# Patient Record
Sex: MALE | Race: WHITE | Employment: OTHER | URBAN - NONMETROPOLITAN AREA
[De-identification: names, ages, dates, MRNs, and addresses within clinical notes are randomized per-mention and may not be internally consistent; named-entity substitution may affect disease eponyms.]

---

## 2017-01-01 ENCOUNTER — HOSPITAL ENCOUNTER (OUTPATIENT)
Dept: LAB | Age: 77
Discharge: HOME | End: 2017-01-31

## 2017-01-23 LAB
INR BLD: 2.04 INDEX
PROTHROMBIN TIME: 24.4 SECONDS (ref 10–14.3)

## 2017-02-01 ENCOUNTER — HOSPITAL ENCOUNTER (OUTPATIENT)
Dept: LAB | Age: 77
Discharge: HOME | End: 2017-02-28

## 2017-02-20 LAB
INR BLD: 2.6 INDEX
PROTHROMBIN TIME: 31.3 SECONDS (ref 10–14.3)

## 2017-03-01 ENCOUNTER — HOSPITAL ENCOUNTER (OUTPATIENT)
Dept: LAB | Age: 77
Discharge: HOME | End: 2017-03-31

## 2017-03-17 ENCOUNTER — HOSPITAL ENCOUNTER (OUTPATIENT)
Dept: LAB | Age: 77
Discharge: HOME | End: 2017-03-17

## 2017-03-17 LAB
ALBUMIN SERPL-MCNC: 4.3 GM/DL (ref 3.4–5)
ALP BLD-CCNC: 90 IU/L (ref 40–129)
ALT SERPL-CCNC: 17 U/L (ref 10–40)
ANION GAP SERPL CALCULATED.3IONS-SCNC: 5 MMOL/L (ref 4–16)
AST SERPL-CCNC: 20 IU/L (ref 15–37)
BASOPHILS ABSOLUTE: 0.1 K/CU MM
BASOPHILS RELATIVE PERCENT: 1.1 % (ref 0–1)
BILIRUB SERPL-MCNC: 0.7 MG/DL (ref 0–1)
BUN BLDV-MCNC: 13 MG/DL (ref 6–23)
CALCIUM SERPL-MCNC: 9.3 MG/DL (ref 8.3–10.6)
CHLORIDE BLD-SCNC: 101 MMOL/L (ref 99–110)
CHOLESTEROL: 175 MG/DL
CO2: 32 MMOL/L (ref 21–32)
CREAT SERPL-MCNC: 1.1 MG/DL (ref 0.9–1.3)
DIFFERENTIAL TYPE: ABNORMAL
EOSINOPHILS ABSOLUTE: 0.2 K/CU MM
EOSINOPHILS RELATIVE PERCENT: 3.5 % (ref 0–3)
GFR AFRICAN AMERICAN: >60 ML/MIN/1.73M2
GFR NON-AFRICAN AMERICAN: >60 ML/MIN/1.73M2
GLUCOSE BLD-MCNC: 95 MG/DL (ref 70–140)
HCT VFR BLD CALC: 45.7 % (ref 42–52)
HDLC SERPL-MCNC: 33 MG/DL
HEMOGLOBIN: 14.7 GM/DL (ref 13.5–18)
IMMATURE NEUTROPHIL %: 0.2 % (ref 0–0.43)
INR BLD: 3.59 INDEX
LDL CHOLESTEROL DIRECT: 130 MG/DL
LYMPHOCYTES ABSOLUTE: 1.7 K/CU MM
LYMPHOCYTES RELATIVE PERCENT: 37.4 % (ref 24–44)
MCH RBC QN AUTO: 32 PG (ref 27–31)
MCHC RBC AUTO-ENTMCNC: 32.2 % (ref 32–36)
MCV RBC AUTO: 99.6 FL (ref 78–100)
MONOCYTES ABSOLUTE: 0.5 K/CU MM
MONOCYTES RELATIVE PERCENT: 11.1 % (ref 0–4)
PDW BLD-RTO: 12.5 % (ref 11.7–14.9)
PLATELET # BLD: 179 K/CU MM (ref 140–440)
PMV BLD AUTO: 9.5 FL (ref 7.5–11.1)
POTASSIUM SERPL-SCNC: 4.3 MMOL/L (ref 3.5–5.1)
PROTHROMBIN TIME: 42.6 SECONDS (ref 10–14.3)
RBC # BLD: 4.59 M/CU MM (ref 4.6–6.2)
SEGMENTED NEUTROPHILS ABSOLUTE COUNT: 2.1 K/CU MM
SEGMENTED NEUTROPHILS RELATIVE PERCENT: 46.7 % (ref 36–66)
SODIUM BLD-SCNC: 138 MMOL/L (ref 135–145)
TOTAL CK: 96 IU/L (ref 38–174)
TOTAL IMMATURE NEUTOROPHIL: 0.01 K/CU MM
TOTAL PROTEIN: 7.2 GM/DL (ref 6.4–8.2)
TRIGL SERPL-MCNC: 127 MG/DL
WBC # BLD: 4.5 K/CU MM (ref 4–10.5)

## 2017-03-28 RX ORDER — LOSARTAN POTASSIUM 25 MG/1
25 TABLET ORAL DAILY
COMMUNITY
End: 2017-08-15

## 2017-04-01 ENCOUNTER — HOSPITAL ENCOUNTER (OUTPATIENT)
Dept: LAB | Age: 77
Discharge: HOME | End: 2017-04-30

## 2017-04-04 LAB
INR BLD: 2.04 INDEX
PROTHROMBIN TIME: 23.8 SECONDS (ref 10–14.3)

## 2017-04-18 LAB
INR BLD: 1.9 INDEX
PROTHROMBIN TIME: 22.1 SECONDS (ref 10–14.3)

## 2017-05-01 ENCOUNTER — HOSPITAL ENCOUNTER (OUTPATIENT)
Dept: LAB | Age: 77
Discharge: HOME | End: 2017-05-31

## 2017-05-17 LAB
INR BLD: 1.88 INDEX
PROTHROMBIN TIME: 21.8 SECONDS (ref 10–14.3)

## 2017-06-01 ENCOUNTER — HOSPITAL ENCOUNTER (OUTPATIENT)
Dept: LAB | Age: 77
Discharge: HOME | End: 2017-06-30

## 2017-06-01 LAB
INR BLD: 1.99 INDEX
PROTHROMBIN TIME: 23.2 SECONDS (ref 10–14.3)

## 2017-06-30 LAB
INR BLD: 1.56 INDEX
PROTHROMBIN TIME: 18 SECONDS (ref 10–14.3)

## 2017-07-01 ENCOUNTER — HOSPITAL ENCOUNTER (OUTPATIENT)
Dept: LAB | Age: 77
Discharge: HOME | End: 2017-07-31

## 2017-07-07 ENCOUNTER — HOSPITAL ENCOUNTER (OUTPATIENT)
Dept: LAB | Age: 77
Discharge: HOME | End: 2017-07-07

## 2017-07-07 LAB
ALBUMIN SERPL-MCNC: 4.3 GM/DL (ref 3.4–5)
ALP BLD-CCNC: 89 IU/L (ref 40–129)
ALT SERPL-CCNC: 17 U/L (ref 10–40)
ANION GAP SERPL CALCULATED.3IONS-SCNC: 8 MMOL/L (ref 4–16)
AST SERPL-CCNC: 19 IU/L (ref 15–37)
BASOPHILS ABSOLUTE: 0.1 K/CU MM
BASOPHILS RELATIVE PERCENT: 1.5 % (ref 0–1)
BILIRUB SERPL-MCNC: 0.8 MG/DL (ref 0–1)
BUN BLDV-MCNC: 14 MG/DL (ref 6–23)
CALCIUM SERPL-MCNC: 9.7 MG/DL (ref 8.3–10.6)
CHLORIDE BLD-SCNC: 101 MMOL/L (ref 99–110)
CHOLESTEROL, FASTING: 175 MG/DL
CO2: 30 MMOL/L (ref 21–32)
CREAT SERPL-MCNC: 1.1 MG/DL (ref 0.9–1.3)
DIFFERENTIAL TYPE: ABNORMAL
EOSINOPHILS ABSOLUTE: 0.2 K/CU MM
EOSINOPHILS RELATIVE PERCENT: 3.7 % (ref 0–3)
GFR AFRICAN AMERICAN: >60 ML/MIN/1.73M2
GFR NON-AFRICAN AMERICAN: >60 ML/MIN/1.73M2
GLUCOSE FASTING: 94 MG/DL (ref 70–99)
HCT VFR BLD CALC: 47.3 % (ref 42–52)
HDLC SERPL-MCNC: 34 MG/DL
HEMOGLOBIN: 15.3 GM/DL (ref 13.5–18)
IMMATURE NEUTROPHIL %: 0.2 % (ref 0–0.43)
LDL CHOLESTEROL DIRECT: 127 MG/DL
LYMPHOCYTES ABSOLUTE: 2 K/CU MM
LYMPHOCYTES RELATIVE PERCENT: 42 % (ref 24–44)
MCH RBC QN AUTO: 32.6 PG (ref 27–31)
MCHC RBC AUTO-ENTMCNC: 32.3 % (ref 32–36)
MCV RBC AUTO: 100.9 FL (ref 78–100)
MONOCYTES ABSOLUTE: 0.5 K/CU MM
MONOCYTES RELATIVE PERCENT: 11.6 % (ref 0–4)
PDW BLD-RTO: 12.8 % (ref 11.7–14.9)
PLATELET # BLD: 217 K/CU MM (ref 140–440)
PMV BLD AUTO: 9.5 FL (ref 7.5–11.1)
POTASSIUM SERPL-SCNC: 4.5 MMOL/L (ref 3.5–5.1)
RBC # BLD: 4.69 M/CU MM (ref 4.6–6.2)
SEGMENTED NEUTROPHILS ABSOLUTE COUNT: 1.9 K/CU MM
SEGMENTED NEUTROPHILS RELATIVE PERCENT: 41 % (ref 36–66)
SODIUM BLD-SCNC: 139 MMOL/L (ref 135–145)
TOTAL CK: 123 IU/L (ref 38–174)
TOTAL IMMATURE NEUTOROPHIL: 0.01 K/CU MM
TOTAL PROTEIN: 7.3 GM/DL (ref 6.4–8.2)
TRIGLYCERIDE, FASTING: 102 MG/DL
WBC # BLD: 4.6 K/CU MM (ref 4–10.5)

## 2017-07-28 ENCOUNTER — TELEPHONE (OUTPATIENT)
Dept: CARDIOLOGY CLINIC | Age: 77
End: 2017-07-28

## 2017-07-28 ENCOUNTER — HOSPITAL ENCOUNTER (OUTPATIENT)
Dept: LAB | Age: 77
Discharge: HOME | End: 2017-07-28

## 2017-07-28 LAB
ANION GAP SERPL CALCULATED.3IONS-SCNC: 11 MMOL/L (ref 4–16)
BUN BLDV-MCNC: 17 MG/DL (ref 6–23)
CALCIUM SERPL-MCNC: 9 MG/DL (ref 8.3–10.6)
CHLORIDE BLD-SCNC: 99 MMOL/L (ref 99–110)
CO2: 27 MMOL/L (ref 21–32)
CREAT SERPL-MCNC: 1.1 MG/DL (ref 0.9–1.3)
GFR AFRICAN AMERICAN: >60 ML/MIN/1.73M2
GFR NON-AFRICAN AMERICAN: >60 ML/MIN/1.73M2
GLUCOSE BLD-MCNC: 87 MG/DL (ref 70–140)
POTASSIUM SERPL-SCNC: 4.3 MMOL/L (ref 3.5–5.1)
SODIUM BLD-SCNC: 137 MMOL/L (ref 135–145)

## 2017-08-01 ENCOUNTER — HOSPITAL ENCOUNTER (OUTPATIENT)
Dept: OTHER | Age: 77
Discharge: HOME | End: 2017-08-31
Attending: INTERNAL MEDICINE | Admitting: INTERNAL MEDICINE

## 2017-08-01 ENCOUNTER — TELEPHONE (OUTPATIENT)
Age: 77
End: 2017-08-01

## 2017-08-01 ENCOUNTER — ANTI-COAG VISIT (OUTPATIENT)
Age: 77
End: 2017-08-01

## 2017-08-01 LAB
INR BLD: 1.7
POC INR: 1.7 INDEX
PROTHROMBIN TIME, POC: 20.6 SECONDS (ref 10–14.3)
PROTIME: 20.6 SECONDS

## 2017-08-15 ENCOUNTER — ANTI-COAG VISIT (OUTPATIENT)
Age: 77
End: 2017-08-15

## 2017-08-15 DIAGNOSIS — I48.91 ATRIAL FIBRILLATION, UNSPECIFIED TYPE (HCC): ICD-10-CM

## 2017-08-15 LAB
INR BLD: 2.3
POC INR: 2.3 INDEX
PROTHROMBIN TIME, POC: 27.3 SECONDS (ref 10–14.3)
PROTIME: 27.3 SECONDS

## 2017-08-15 RX ORDER — OLMESARTAN MEDOXOMIL 5 MG/1
5 TABLET ORAL 2 TIMES DAILY
COMMUNITY

## 2017-08-15 RX ORDER — HYDROCHLOROTHIAZIDE 25 MG/1
25 TABLET ORAL DAILY
COMMUNITY

## 2017-09-01 ENCOUNTER — HOSPITAL ENCOUNTER (OUTPATIENT)
Dept: OTHER | Age: 77
Discharge: HOME | End: 2017-09-30
Attending: INTERNAL MEDICINE | Admitting: INTERNAL MEDICINE

## 2017-09-12 ENCOUNTER — ANTI-COAG VISIT (OUTPATIENT)
Age: 77
End: 2017-09-12

## 2017-09-12 DIAGNOSIS — I48.91 ATRIAL FIBRILLATION, UNSPECIFIED TYPE (HCC): ICD-10-CM

## 2017-09-12 LAB
INR BLD: 2.1
POC INR: 2.1 INDEX
PROTHROMBIN TIME, POC: 25.1 SECONDS (ref 10–14.3)
PROTIME: 25.1 SECONDS

## 2017-10-01 ENCOUNTER — HOSPITAL ENCOUNTER (OUTPATIENT)
Dept: OTHER | Age: 77
Discharge: HOME | End: 2017-10-31
Attending: INTERNAL MEDICINE | Admitting: INTERNAL MEDICINE

## 2017-10-10 ENCOUNTER — ANTI-COAG VISIT (OUTPATIENT)
Age: 77
End: 2017-10-10

## 2017-10-10 DIAGNOSIS — I48.91 ATRIAL FIBRILLATION, UNSPECIFIED TYPE (HCC): ICD-10-CM

## 2017-10-10 LAB
INR BLD: 2
POC INR: 2 INDEX
PROTHROMBIN TIME, POC: 24 SECONDS (ref 10–14.3)
PROTIME: 24 SECONDS

## 2017-10-10 RX ORDER — WARFARIN SODIUM 1 MG/1
TABLET ORAL
COMMUNITY
End: 2018-02-27 | Stop reason: SDUPTHER

## 2017-10-10 RX ORDER — WARFARIN SODIUM 2.5 MG/1
TABLET ORAL
COMMUNITY
End: 2018-03-27 | Stop reason: SDUPTHER

## 2017-10-10 NOTE — PROGRESS NOTES
Patient verifies current dosing regimen as listed above. Patient denies any missed or extra doses. Patient denies any unusual bruising/bleeding/swelling/SOB. Patient denies changes in diet involving vitamin K. Patient denies any changes in prescription/OTC/herbal medications. Patient denies recent falls. Continue warfarin 2.5mg daily except 3.5mg Tuesdays and Fridays and return to clinic in 4 weeks. Dosing reminder sheet given with phone number, appointment date, and time.

## 2017-11-01 ENCOUNTER — HOSPITAL ENCOUNTER (OUTPATIENT)
Dept: OTHER | Age: 77
Discharge: HOME | End: 2017-11-30
Attending: INTERNAL MEDICINE | Admitting: INTERNAL MEDICINE

## 2017-11-07 ENCOUNTER — ANTI-COAG VISIT (OUTPATIENT)
Age: 77
End: 2017-11-07

## 2017-11-07 DIAGNOSIS — I48.91 ATRIAL FIBRILLATION, UNSPECIFIED TYPE (HCC): ICD-10-CM

## 2017-11-07 LAB
INR BLD: 1.7
POC INR: 1.7 INDEX
PROTHROMBIN TIME, POC: 20.1 SECONDS (ref 10–14.3)
PROTIME: 20.1 SECONDS

## 2017-11-07 NOTE — PROGRESS NOTES
Patient verifies current dosing regimen as listed above. Patient denies any missed or extra doses. Patient denies any unusual bruising/bleeding/swelling/SOB. Patient denies changes in diet involving vitamin K. Patient denies any changes in prescription/OTC/herbal medications. Patient denies recent falls. Take warfarin 5mg x 1 then increase dose by ~5% to warfarin 2.5mg daily except 3.5mg MWF and return to clinic in 4 weeks. Dosing reminder sheet given with phone number, appointment date, and time.

## 2017-11-14 ENCOUNTER — OFFICE VISIT (OUTPATIENT)
Dept: CARDIOLOGY CLINIC | Age: 77
End: 2017-11-14

## 2017-11-14 VITALS
WEIGHT: 234 LBS | BODY MASS INDEX: 32.76 KG/M2 | HEIGHT: 71 IN | SYSTOLIC BLOOD PRESSURE: 146 MMHG | HEART RATE: 80 BPM | DIASTOLIC BLOOD PRESSURE: 82 MMHG | RESPIRATION RATE: 16 BRPM

## 2017-11-14 DIAGNOSIS — I48.91 ATRIAL FIBRILLATION, UNSPECIFIED TYPE (HCC): Primary | ICD-10-CM

## 2017-11-14 DIAGNOSIS — I87.2 VENOUS INSUFFICIENCY: ICD-10-CM

## 2017-11-14 DIAGNOSIS — I10 ESSENTIAL HYPERTENSION: ICD-10-CM

## 2017-11-14 PROCEDURE — 99213 OFFICE O/P EST LOW 20 MIN: CPT | Performed by: INTERNAL MEDICINE

## 2017-11-14 PROCEDURE — 4040F PNEUMOC VAC/ADMIN/RCVD: CPT | Performed by: INTERNAL MEDICINE

## 2017-11-14 PROCEDURE — 1036F TOBACCO NON-USER: CPT | Performed by: INTERNAL MEDICINE

## 2017-11-14 PROCEDURE — 1123F ACP DISCUSS/DSCN MKR DOCD: CPT | Performed by: INTERNAL MEDICINE

## 2017-11-14 PROCEDURE — G8484 FLU IMMUNIZE NO ADMIN: HCPCS | Performed by: INTERNAL MEDICINE

## 2017-11-14 PROCEDURE — G8417 CALC BMI ABV UP PARAM F/U: HCPCS | Performed by: INTERNAL MEDICINE

## 2017-11-14 PROCEDURE — 93000 ELECTROCARDIOGRAM COMPLETE: CPT | Performed by: INTERNAL MEDICINE

## 2017-11-14 PROCEDURE — G8427 DOCREV CUR MEDS BY ELIG CLIN: HCPCS | Performed by: INTERNAL MEDICINE

## 2017-11-14 RX ORDER — DIAZEPAM 5 MG/1
5 TABLET ORAL
COMMUNITY
Start: 2017-11-03 | End: 2019-10-25 | Stop reason: ALTCHOICE

## 2017-11-14 NOTE — ASSESSMENT & PLAN NOTE
Rate is controlled and he is chronically anticoagulated and he seems to be tolerating warfarin with the low therapeutic INR well. Did not have recurrent epistaxis since last visit.

## 2017-11-14 NOTE — PROGRESS NOTES
[id number] Score for Atrial Fibrillation Stroke Risk   Risk   Factors  Component Value   C CHF No 0   H HTN Yes 1   A2 Age >= 76 Yes,  (77 y.o.) 2   D DM No 0   S2 Prior Stroke/TIA No 0   V Vascular Disease No 0   A Age 74-69 No,  (77 y.o.) 0   Sc Sex male 0    [id number]  Score  3   Score last updated 00/04/53 0:97 PM    Click here for a link to the UpToDate guideline \"Atrial Fibrillation: Anticoagulation therapy to prevent embolization    Disclaimer: Risk Score calculation is dependent on accuracy of patient problem list and past encounter diagnosis.

## 2017-11-14 NOTE — PROGRESS NOTES
Dharmesh Hernandez  1940  Marciano Doss MD    Chief complaint and HPI:  Dharmesh Hernandez  is a 59-year-old male follows up for chronic atrial fibrillation and hypertension. He denies any chest pain, palpitations, lightheaded mass or dizziness. He denies any significant dyspnea on exertion, orthopnea or paroxysmal nocturnal dyspnea. He has been very compliant to his medications. He denies any syncope or near syncope. He had multiple venous ablation more on the left than on the right by Dr. Patricia Ca and his leg swelling has improved. He has been using compression hoses regularly. He is being monitored now active Coumadin clinic for PT/INR and he expresses appreciation for further service locally. Rest of the Cardiovascular system review is otherwise unchanged from prior encounter. Past medical history:  has a past medical history of Atrial flutter ([address] H/O Doppler ultrasound; H/O echocardiogram; H/O myocardial perfusion scan; Hypertension; Varicose veins; and Venous insufficiency. Past surgical history:  has a past surgical history that includes Cholecystectomy; hernia repair; Tooth Extraction; Cataract removal; Hemorrhoid surgery; ostate Cryoablation (09/22/2016); cyst removal; and Cardioversion (3/6/2012). Social History:   Social History   Substance Use Topics    Smoking status: Former Smoker     Years: 40.00     Quit date: 1/1/2000    Smokeless tobacco: Never Used      Comment: smoked pipe    Alcohol use No     Family history: family history is not on file. ALLERGIES:  Latex; Dicyclomine; and Neosporin [bacitracin-neomycin-polymyxin]  Prior to Admission medications    Medication Sig Start Date End Date Taking? Authorizing Provider   diazepam (VALIUM) 5 MG tablet 5 mg  1 tab, before procedures.  11/3/17  Yes Historical Provider, MD   warfarin (COUMADIN) 1 MG tablet Take warfarin 2.5mg daily except 3.5mg Tuesdays and Fridays   Yes Historical Provider, MD   warfarin (COUMADIN) 2.5 MG tablet Take warfarin 2.5mg daily except 3.5mg Tuesdays and Fridays   Yes Historical Provider, MD   olmesartan (BENICAR) 5 MG tablet Take 5 mg by mouth 2 times daily   Yes Historical Provider, MD   hydrochlorothiazide (HYDRODIURIL) 25 MG tablet Take 25 mg by mouth daily   Yes Historical Provider, MD   metoprolol (LOPRESSOR) 50 MG tablet Take 1 tablet by mouth 2 times daily 7/21/15  Yes Christy Plascencia MD     Constitutional:  BP (!) 146/82   Pulse 80   Resp 16   Ht 5' 11\" (1.803 m)   Wt 234 lb (106.1 kg)   BMI 32.64 kg/m²    Body mass index is 32.64 kg/m². Wt Readings from Last 3 Encounters:   11/14/17 234 lb (106.1 kg)   11/15/16 230 lb (104.3 kg)   09/25/16 235 lb (106.6 kg)     General exam: Moderately obese pleasant male in no apparent distress he is awake alert oriented ×3. Head and Neck: Normocephalic. No thyromegaly. Neck is supple. He wears glasses. .    Carotids: No bruits noted   Jugular venous pressure: Not elevated. Heart[de-identified]  Irregular first and second heart sound without any murmurs or gallops. Peripheral Pulses: 1+ posterior tibials equal on both sides. 1+ dorsalis pedis. Extremities: 1+ edema noted bilaterally more on the left than on the right. Chest: Normal  Lungs: Clear to auscultation percussion  Abdomen: Soft non tender. Bowel sounds are normal. No organomegaly or ascites. Musculoskeletal: WNL  Skin: Normal in color and texture. No rash  Psychiatric: Normal mood and effect.    Neurologic exam:  No focal deficit    ECG today showed atrial fibrillation 80 bpm.    LAB REVIEW:  CBC:   Lab Results   Component Value Date    WBC 4.6 07/07/2017    HGB 15.3 07/07/2017    HCT 47.3 07/07/2017     07/07/2017     Lipids:   Lab Results   Component Value Date    CHOL 175 03/17/2017    TRIG 127 03/17/2017    HDL 34 (L) 07/07/2017    LDLDIRECT 127 (H) 07/07/2017     Renal:   Lab Results   Component Value Date    BUN 17 07/28/2017    CREATININE 1.1 07/28/2017     07/28/2017    K 4.3 07/28/2017 PT/INR:   Lab Results   Component Value Date    INR 1.7 11/07/2017     IMPRESSION and RECOMMENDATIONS:      HTN (hypertension)  Well controlled on current medications, reviewed individually with patient. AF (atrial fibrillation)  Rate is controlled and he is chronically anticoagulated and he seems to be tolerating warfarin with the low therapeutic INR well. Did not have recurrent epistaxis since last visit. Continue current cardiovascular medications which have been reviewed and discussed individually with you. Coumadin clinic monitoring PT/INR. Appropriate prescriptions if needed on this visit are addressed. After visit summery is provided. Questions answered and patient verbalizes understanding. Follow up in office in 12 months with ECG, sooner if needed. Riccardo Pinzon MD, 11/14/2017 3:13 PM     Please note this report has been partially produced using speech recognition software and may contain errors related to that system including errors in grammar, punctuation, and spelling, as well as words and phrases that may be inappropriate. If there are any questions or concerns please feel free to contact the dictating provider for clarification.

## 2017-12-01 ENCOUNTER — HOSPITAL ENCOUNTER (OUTPATIENT)
Dept: OTHER | Age: 77
Discharge: HOME | End: 2017-12-31
Attending: INTERNAL MEDICINE | Admitting: INTERNAL MEDICINE

## 2017-12-05 ENCOUNTER — ANTI-COAG VISIT (OUTPATIENT)
Age: 77
End: 2017-12-05

## 2017-12-05 DIAGNOSIS — I48.91 ATRIAL FIBRILLATION, UNSPECIFIED TYPE (HCC): ICD-10-CM

## 2017-12-05 LAB
INR BLD: 2.1
POC INR: 2.1 INDEX
PROTHROMBIN TIME, POC: 25 SECONDS (ref 10–14.3)
PROTIME: 25 SECONDS

## 2017-12-05 NOTE — PROGRESS NOTES
Patient verifies current dosing regimen as listed above. Patient denies any missed or extra doses. Patient denies any unusual bruising/bleeding/swelling/SOB. Patient denies changes in diet involving vitamin K. Patient denies any changes in prescription/OTC/herbal medications. Patient denies recent falls. Continue warfarin 2.5mg daily except 3.5mg MWF and return to clinic in 4 weeks. Dosing reminder sheet given with phone number, appointment date, and time.

## 2018-01-01 ENCOUNTER — HOSPITAL ENCOUNTER (OUTPATIENT)
Dept: OTHER | Age: 78
Discharge: HOME | End: 2018-01-31
Attending: INTERNAL MEDICINE | Admitting: INTERNAL MEDICINE

## 2018-01-02 ENCOUNTER — ANTI-COAG VISIT (OUTPATIENT)
Age: 78
End: 2018-01-02

## 2018-01-02 DIAGNOSIS — I48.91 ATRIAL FIBRILLATION, UNSPECIFIED TYPE (HCC): ICD-10-CM

## 2018-01-02 LAB
INR BLD: 2.9
POC INR: 2.9 INDEX
PROTHROMBIN TIME, POC: 34.4 SECONDS (ref 10–14.3)
PROTIME: 34.4 SECONDS

## 2018-01-30 ENCOUNTER — ANTI-COAG VISIT (OUTPATIENT)
Age: 78
End: 2018-01-30

## 2018-01-30 DIAGNOSIS — I48.91 ATRIAL FIBRILLATION, UNSPECIFIED TYPE (HCC): ICD-10-CM

## 2018-01-30 LAB
INR BLD: 3.1
POC INR: 3.1 INDEX
PROTHROMBIN TIME, POC: 37.1 SECONDS (ref 10–14.3)
PROTIME: 37.1 SECONDS

## 2018-02-01 ENCOUNTER — HOSPITAL ENCOUNTER (OUTPATIENT)
Dept: OTHER | Age: 78
Discharge: HOME | End: 2018-02-28
Attending: INTERNAL MEDICINE | Admitting: INTERNAL MEDICINE

## 2018-02-15 ENCOUNTER — HOSPITAL ENCOUNTER (OUTPATIENT)
Dept: OTHER | Age: 78
Discharge: HOME | End: 2018-02-15

## 2018-02-15 LAB
ALBUMIN SERPL-MCNC: 4.4 GM/DL (ref 3.4–5)
ALP BLD-CCNC: 98 IU/L (ref 40–129)
ALT SERPL-CCNC: 16 U/L (ref 10–40)
ANION GAP SERPL CALCULATED.3IONS-SCNC: 11 MMOL/L (ref 4–16)
AST SERPL-CCNC: 21 IU/L (ref 15–37)
BASOPHILS ABSOLUTE: 0.1 K/CU MM
BASOPHILS RELATIVE PERCENT: 1.1 % (ref 0–1)
BILIRUB SERPL-MCNC: 0.6 MG/DL (ref 0–1)
BUN BLDV-MCNC: 22 MG/DL (ref 6–23)
CALCIUM SERPL-MCNC: 9.4 MG/DL (ref 8.3–10.6)
CHLORIDE BLD-SCNC: 99 MMOL/L (ref 99–110)
CHOLESTEROL, FASTING: 197 MG/DL
CO2: 31 MMOL/L (ref 21–32)
CREAT SERPL-MCNC: 1.2 MG/DL (ref 0.9–1.3)
DIFFERENTIAL TYPE: ABNORMAL
EOSINOPHILS ABSOLUTE: 0.2 K/CU MM
EOSINOPHILS RELATIVE PERCENT: 3.7 % (ref 0–3)
GFR AFRICAN AMERICAN: >60 ML/MIN/1.73M2
GFR NON-AFRICAN AMERICAN: 59 ML/MIN/1.73M2
GLUCOSE FASTING: 101 MG/DL (ref 70–99)
HCT VFR BLD CALC: 47.3 % (ref 42–52)
HDLC SERPL-MCNC: 31 MG/DL
HEMOGLOBIN: 15.5 GM/DL (ref 13.5–18)
IMMATURE NEUTROPHIL %: 0.4 % (ref 0–0.43)
LDL CHOLESTEROL DIRECT: 144 MG/DL
LYMPHOCYTES ABSOLUTE: 2.1 K/CU MM
LYMPHOCYTES RELATIVE PERCENT: 36.7 % (ref 24–44)
MCH RBC QN AUTO: 32.9 PG (ref 27–31)
MCHC RBC AUTO-ENTMCNC: 32.8 % (ref 32–36)
MCV RBC AUTO: 100.4 FL (ref 78–100)
MONOCYTES ABSOLUTE: 0.6 K/CU MM
MONOCYTES RELATIVE PERCENT: 11.2 % (ref 0–4)
PDW BLD-RTO: 12.5 % (ref 11.7–14.9)
PLATELET # BLD: 224 K/CU MM (ref 140–440)
PMV BLD AUTO: 9.6 FL (ref 7.5–11.1)
POTASSIUM SERPL-SCNC: 4.2 MMOL/L (ref 3.5–5.1)
RBC # BLD: 4.71 M/CU MM (ref 4.6–6.2)
SEGMENTED NEUTROPHILS ABSOLUTE COUNT: 2.6 K/CU MM
SEGMENTED NEUTROPHILS RELATIVE PERCENT: 46.9 % (ref 36–66)
SODIUM BLD-SCNC: 141 MMOL/L (ref 135–145)
TOTAL CK: 114 IU/L (ref 38–174)
TOTAL IMMATURE NEUTOROPHIL: 0.02 K/CU MM
TOTAL PROTEIN: 7.3 GM/DL (ref 6.4–8.2)
TRIGLYCERIDE, FASTING: 169 MG/DL
WBC # BLD: 5.6 K/CU MM (ref 4–10.5)

## 2018-02-27 ENCOUNTER — ANTI-COAG VISIT (OUTPATIENT)
Age: 78
End: 2018-02-27

## 2018-02-27 DIAGNOSIS — I48.91 ATRIAL FIBRILLATION, UNSPECIFIED TYPE (HCC): ICD-10-CM

## 2018-02-27 LAB
INR BLD: 2.5
POC INR: 2.5 INDEX
PROTHROMBIN TIME, POC: 30.5 SECONDS (ref 10–14.3)
PROTIME: 30.5 SECONDS

## 2018-02-27 RX ORDER — WARFARIN SODIUM 1 MG/1
TABLET ORAL
Qty: 36 TABLET | Refills: 3 | Status: SHIPPED | OUTPATIENT
Start: 2018-02-27 | End: 2018-11-16

## 2018-02-27 NOTE — PROGRESS NOTES
Patient verifies current dosing regimen as listed above. Patient denies any missed or extra doses. Patient denies any unusual bruising/bleeding/swelling/SOB. Patient denies changes in diet involving vitamin K. Patient denies any changes in prescription/OTC/herbal medications. Patient denies recent falls. Continue warfarin 2.5mg daily except 3.5mg MWF and return to clinic in 4 weeks. Dosing reminder sheet given with phone number, appointment date, and time. E-scribed warfarin 1mg tablets #36 with 3 refills to [facility].

## 2018-03-01 ENCOUNTER — HOSPITAL ENCOUNTER (OUTPATIENT)
Dept: OTHER | Age: 78
Discharge: HOME | End: 2018-03-31
Attending: INTERNAL MEDICINE | Admitting: INTERNAL MEDICINE

## 2018-03-27 ENCOUNTER — ANTI-COAG VISIT (OUTPATIENT)
Age: 78
End: 2018-03-27

## 2018-03-27 DIAGNOSIS — I48.91 ATRIAL FIBRILLATION, UNSPECIFIED TYPE (HCC): ICD-10-CM

## 2018-03-27 LAB
INR BLD: 2.3
POC INR: 2.3 INDEX
PROTHROMBIN TIME, POC: 27.9 SECONDS (ref 10–14.3)
PROTIME: 27.9 SECONDS

## 2018-03-27 RX ORDER — WARFARIN SODIUM 2.5 MG/1
TABLET ORAL
Qty: 30 TABLET | Refills: 3 | Status: SHIPPED | OUTPATIENT
Start: 2018-03-27 | End: 2018-11-16

## 2018-04-01 ENCOUNTER — HOSPITAL ENCOUNTER (OUTPATIENT)
Dept: OTHER | Age: 78
Discharge: HOME | End: 2018-04-30
Attending: INTERNAL MEDICINE | Admitting: INTERNAL MEDICINE

## 2018-04-17 ENCOUNTER — TELEPHONE (OUTPATIENT)
Age: 78
End: 2018-04-17

## 2018-04-24 ENCOUNTER — ANTI-COAG VISIT (OUTPATIENT)
Age: 78
End: 2018-04-24

## 2018-04-24 DIAGNOSIS — I48.91 ATRIAL FIBRILLATION, UNSPECIFIED TYPE (HCC): ICD-10-CM

## 2018-04-24 LAB
INR BLD: 3.4
POC INR: 3.4 INDEX
PROTHROMBIN TIME, POC: 40.8 SECONDS (ref 10–14.3)
PROTIME: 40.8 SECONDS

## 2018-05-01 ENCOUNTER — HOSPITAL ENCOUNTER (OUTPATIENT)
Dept: OTHER | Age: 78
Discharge: HOME | End: 2018-05-31
Attending: INTERNAL MEDICINE | Admitting: INTERNAL MEDICINE

## 2018-05-22 ENCOUNTER — ANTI-COAG VISIT (OUTPATIENT)
Age: 78
End: 2018-05-22

## 2018-05-22 DIAGNOSIS — I48.91 ATRIAL FIBRILLATION, UNSPECIFIED TYPE (HCC): ICD-10-CM

## 2018-05-22 LAB
INR BLD: 2
POC INR: 2 INDEX
PROTHROMBIN TIME, POC: 24.1 SECONDS (ref 10–14.3)
PROTIME: 24.1 SECONDS

## 2018-06-01 ENCOUNTER — HOSPITAL ENCOUNTER (OUTPATIENT)
Dept: OTHER | Age: 78
Discharge: HOME | End: 2018-06-30
Attending: INTERNAL MEDICINE | Admitting: INTERNAL MEDICINE

## 2018-06-19 ENCOUNTER — ANTI-COAG VISIT (OUTPATIENT)
Age: 78
End: 2018-06-19

## 2018-06-19 DIAGNOSIS — I48.91 ATRIAL FIBRILLATION, UNSPECIFIED TYPE (HCC): ICD-10-CM

## 2018-06-19 LAB
INR BLD: 2.1
POC INR: 2.1 INDEX
PROTHROMBIN TIME, POC: 24.8 SECONDS (ref 10–14.3)
PROTIME: 24.8 SECONDS

## 2018-07-01 ENCOUNTER — HOSPITAL ENCOUNTER (OUTPATIENT)
Dept: OTHER | Age: 78
Discharge: HOME | End: 2018-07-31
Attending: INTERNAL MEDICINE | Admitting: INTERNAL MEDICINE

## 2018-07-17 ENCOUNTER — ANTI-COAG VISIT (OUTPATIENT)
Age: 78
End: 2018-07-17

## 2018-07-17 DIAGNOSIS — I48.91 ATRIAL FIBRILLATION, UNSPECIFIED TYPE (HCC): ICD-10-CM

## 2018-07-17 LAB
INR BLD: 2.2
POC INR: 2.2 INDEX
PROTHROMBIN TIME, POC: 26.9 SECONDS (ref 10–14.3)
PROTIME: 26.9 SECONDS

## 2018-08-01 ENCOUNTER — HOSPITAL ENCOUNTER (OUTPATIENT)
Dept: OTHER | Age: 78
Discharge: HOME | End: 2018-08-31
Attending: INTERNAL MEDICINE | Admitting: INTERNAL MEDICINE

## 2018-08-14 ENCOUNTER — ANTI-COAG VISIT (OUTPATIENT)
Age: 78
End: 2018-08-14

## 2018-08-14 DIAGNOSIS — I48.91 ATRIAL FIBRILLATION, UNSPECIFIED TYPE (HCC): ICD-10-CM

## 2018-08-14 LAB
INR BLD: 2.7
POC INR: 2.7 INDEX
PROTHROMBIN TIME, POC: 32.1 SECONDS (ref 10–14.3)
PROTIME: 32.1 SECONDS

## 2018-08-22 ENCOUNTER — HOSPITAL ENCOUNTER (OUTPATIENT)
Dept: LAB | Age: 78
Discharge: HOME | End: 2018-08-22

## 2018-08-22 LAB
ALBUMIN SERPL-MCNC: 4.4 GM/DL (ref 3.4–5)
ALP BLD-CCNC: 84 IU/L (ref 40–129)
ALT SERPL-CCNC: 16 U/L (ref 10–40)
ANION GAP SERPL CALCULATED.3IONS-SCNC: 11 MMOL/L (ref 4–16)
AST SERPL-CCNC: 21 IU/L (ref 15–37)
BASOPHILS ABSOLUTE: 0.1 K/CU MM
BASOPHILS RELATIVE PERCENT: 1.3 % (ref 0–1)
BILIRUB SERPL-MCNC: 0.8 MG/DL (ref 0–1)
BUN BLDV-MCNC: 17 MG/DL (ref 6–23)
CALCIUM SERPL-MCNC: 9.4 MG/DL (ref 8.3–10.6)
CHLORIDE BLD-SCNC: 99 MMOL/L (ref 99–110)
CHOLESTEROL, FASTING: 203 MG/DL
CO2: 30 MMOL/L (ref 21–32)
CREAT SERPL-MCNC: 1.2 MG/DL (ref 0.9–1.3)
DIFFERENTIAL TYPE: ABNORMAL
EOSINOPHILS ABSOLUTE: 0.2 K/CU MM
EOSINOPHILS RELATIVE PERCENT: 2.9 % (ref 0–3)
GFR AFRICAN AMERICAN: >60 ML/MIN/1.73M2
GFR NON-AFRICAN AMERICAN: 59 ML/MIN/1.73M2
GLUCOSE FASTING: 96 MG/DL (ref 70–99)
HCT VFR BLD CALC: 46.7 % (ref 42–52)
HDLC SERPL-MCNC: 35 MG/DL
HEMOGLOBIN: 15.2 GM/DL (ref 13.5–18)
IMMATURE NEUTROPHIL %: 0.2 % (ref 0–0.43)
LDL CHOLESTEROL DIRECT: 149 MG/DL
LYMPHOCYTES ABSOLUTE: 1.9 K/CU MM
LYMPHOCYTES RELATIVE PERCENT: 34.7 % (ref 24–44)
MCH RBC QN AUTO: 32.3 PG (ref 27–31)
MCHC RBC AUTO-ENTMCNC: 32.5 % (ref 32–36)
MCV RBC AUTO: 99.2 FL (ref 78–100)
MONOCYTES ABSOLUTE: 0.6 K/CU MM
MONOCYTES RELATIVE PERCENT: 10.7 % (ref 0–4)
PDW BLD-RTO: 12.7 % (ref 11.7–14.9)
PLATELET # BLD: 220 K/CU MM (ref 140–440)
PMV BLD AUTO: 9.3 FL (ref 7.5–11.1)
POTASSIUM SERPL-SCNC: 3.8 MMOL/L (ref 3.5–5.1)
RBC # BLD: 4.71 M/CU MM (ref 4.6–6.2)
SEGMENTED NEUTROPHILS ABSOLUTE COUNT: 2.8 K/CU MM
SEGMENTED NEUTROPHILS RELATIVE PERCENT: 50.2 % (ref 36–66)
SODIUM BLD-SCNC: 140 MMOL/L (ref 135–145)
TOTAL IMMATURE NEUTOROPHIL: 0.01 K/CU MM
TOTAL PROTEIN: 7.2 GM/DL (ref 6.4–8.2)
TRIGLYCERIDE, FASTING: 157 MG/DL
WBC # BLD: 5.5 K/CU MM (ref 4–10.5)

## 2018-09-01 ENCOUNTER — HOSPITAL ENCOUNTER (OUTPATIENT)
Dept: OTHER | Age: 78
Discharge: HOME | End: 2018-09-01
Attending: INTERNAL MEDICINE | Admitting: INTERNAL MEDICINE

## 2018-09-07 ENCOUNTER — HOSPITAL ENCOUNTER (OUTPATIENT)
Dept: LAB | Age: 78
Discharge: HOME | End: 2018-09-07

## 2018-09-07 LAB
C-REACTIVE PROTEIN, HIGH SENSITIVITY: 1.7 MG/L
ERYTHROCYTE SEDIMENTATION RATE: 13 MM/HR (ref 0–20)

## 2018-09-09 LAB
ANA TITER: NORMAL
ANTI-NUCLEAR ANTIBODY (ANA): DETECTED
CYCLIC CITRULLINATED PEPTIDE ANTIBODY IGG: 4

## 2018-09-11 ENCOUNTER — ANTI-COAG VISIT (OUTPATIENT)
Age: 78
End: 2018-09-11

## 2018-09-11 DIAGNOSIS — I48.91 ATRIAL FIBRILLATION, UNSPECIFIED TYPE (HCC): ICD-10-CM

## 2018-09-11 LAB
INR BLD: 2.6
Lab: NORMAL
POC INR: 2.6 INDEX
PROTHROMBIN TIME, POC: 30.7 SECONDS (ref 10–14.3)
PROTIME: 30.7 SECONDS
TEST NAME: NORMAL

## 2018-09-26 ENCOUNTER — HOSPITAL ENCOUNTER (OUTPATIENT)
Age: 78
Discharge: HOME | End: 2018-09-26
Payer: MEDICARE

## 2018-09-26 LAB
ESTIMATED AVERAGE GLUCOSE: 126 MG/DL
FOLATE: 11.9 NG/ML (ref 3.1–17.5)
HBA1C MFR BLD: 6 % (ref 4.2–6.3)
TSH HIGH SENSITIVITY: 0.62 UIU/ML (ref 0.27–4.2)
VITAMIN B-12: 324.9 PG/ML (ref 211–911)

## 2018-09-26 PROCEDURE — 83036 HEMOGLOBIN GLYCOSYLATED A1C: CPT

## 2018-09-26 PROCEDURE — 82746 ASSAY OF FOLIC ACID SERUM: CPT

## 2018-09-26 PROCEDURE — 36415 COLL VENOUS BLD VENIPUNCTURE: CPT

## 2018-09-26 PROCEDURE — 82607 VITAMIN B-12: CPT

## 2018-09-26 PROCEDURE — 84443 ASSAY THYROID STIM HORMONE: CPT

## 2018-09-28 ENCOUNTER — HOSPITAL ENCOUNTER (OUTPATIENT)
Dept: MRI IMAGING | Age: 78
Discharge: HOME | End: 2018-09-28
Payer: MEDICARE

## 2018-09-28 DIAGNOSIS — M48.061 SPINAL STENOSIS OF LUMBAR REGION, UNSPECIFIED WHETHER NEUROGENIC CLAUDICATION PRESENT: ICD-10-CM

## 2018-09-28 DIAGNOSIS — M48.02 CERVICAL SPINAL STENOSIS: ICD-10-CM

## 2018-09-28 PROCEDURE — 72141 MRI NECK SPINE W/O DYE: CPT

## 2018-09-28 PROCEDURE — 72148 MRI LUMBAR SPINE W/O DYE: CPT

## 2018-10-09 ENCOUNTER — ANTI-COAG VISIT (OUTPATIENT)
Dept: PHARMACY | Age: 78
End: 2018-10-09
Payer: MEDICARE

## 2018-10-09 DIAGNOSIS — I48.91 ATRIAL FIBRILLATION, UNSPECIFIED TYPE (HCC): ICD-10-CM

## 2018-10-09 LAB
INR BLD: 2.7
POC INR: 2.7 INDEX
PROTHROMBIN TIME, POC: 32.2 SECONDS (ref 10–14.3)
PROTIME: 32.2 SECONDS

## 2018-10-09 PROCEDURE — 85610 PROTHROMBIN TIME: CPT

## 2018-10-09 PROCEDURE — 99211 OFF/OP EST MAY X REQ PHY/QHP: CPT

## 2018-10-09 PROCEDURE — 36416 COLLJ CAPILLARY BLOOD SPEC: CPT

## 2018-11-06 ENCOUNTER — APPOINTMENT (OUTPATIENT)
Dept: PHARMACY | Age: 78
End: 2018-11-06
Payer: MEDICARE

## 2018-11-13 ENCOUNTER — OFFICE VISIT (OUTPATIENT)
Dept: CARDIOLOGY CLINIC | Age: 78
End: 2018-11-13
Payer: MEDICARE

## 2018-11-13 VITALS
BODY MASS INDEX: 33.04 KG/M2 | SYSTOLIC BLOOD PRESSURE: 138 MMHG | RESPIRATION RATE: 16 BRPM | HEIGHT: 71 IN | DIASTOLIC BLOOD PRESSURE: 84 MMHG | WEIGHT: 236 LBS | HEART RATE: 88 BPM

## 2018-11-13 DIAGNOSIS — I48.91 ATRIAL FIBRILLATION, UNSPECIFIED TYPE (HCC): Primary | ICD-10-CM

## 2018-11-13 DIAGNOSIS — I87.2 VENOUS INSUFFICIENCY: ICD-10-CM

## 2018-11-13 DIAGNOSIS — I10 ESSENTIAL HYPERTENSION: ICD-10-CM

## 2018-11-13 PROCEDURE — 99213 OFFICE O/P EST LOW 20 MIN: CPT | Performed by: INTERNAL MEDICINE

## 2018-11-13 PROCEDURE — G8484 FLU IMMUNIZE NO ADMIN: HCPCS | Performed by: INTERNAL MEDICINE

## 2018-11-13 PROCEDURE — G8427 DOCREV CUR MEDS BY ELIG CLIN: HCPCS | Performed by: INTERNAL MEDICINE

## 2018-11-13 PROCEDURE — 4040F PNEUMOC VAC/ADMIN/RCVD: CPT | Performed by: INTERNAL MEDICINE

## 2018-11-13 PROCEDURE — 93000 ELECTROCARDIOGRAM COMPLETE: CPT | Performed by: INTERNAL MEDICINE

## 2018-11-13 PROCEDURE — G8417 CALC BMI ABV UP PARAM F/U: HCPCS | Performed by: INTERNAL MEDICINE

## 2018-11-13 PROCEDURE — 1123F ACP DISCUSS/DSCN MKR DOCD: CPT | Performed by: INTERNAL MEDICINE

## 2018-11-13 PROCEDURE — 1036F TOBACCO NON-USER: CPT | Performed by: INTERNAL MEDICINE

## 2018-11-13 PROCEDURE — 1101F PT FALLS ASSESS-DOCD LE1/YR: CPT | Performed by: INTERNAL MEDICINE

## 2018-11-13 RX ORDER — SENNOSIDES 8.6 MG
650 CAPSULE ORAL EVERY 8 HOURS PRN
COMMUNITY

## 2018-11-13 RX ORDER — INFLUENZA A VIRUS A/MICHIGAN/45/2015 X-275 (H1N1) ANTIGEN (FORMALDEHYDE INACTIVATED), INFLUENZA A VIRUS A/SINGAPORE/INFIMH-16-0019/2016 IVR-186 (H3N2) ANTIGEN (FORMALDEHYDE INACTIVATED), INFLUENZA B VIRUS B/PHUKET/3073/2013 ANTIGEN (FORMALDEHYDE INACTIVATED), AND INFLUENZA B VIRUS B/MARYLAND/15/2016 BX-69A ANTIGEN (FORMALDEHYDE INACTIVATED) 15; 15; 15; 15 UG/.5ML; UG/.5ML; UG/.5ML; UG/.5ML
INJECTION, SUSPENSION INTRAMUSCULAR
Refills: 0 | COMMUNITY
Start: 2018-09-14 | End: 2019-10-25 | Stop reason: ALTCHOICE

## 2018-11-13 NOTE — ASSESSMENT & PLAN NOTE
Fairly well controlled on current medications. Patient is to start monitoring blood pressure at home and continue current medications. Salt restriction and weight management is counseled.

## 2018-11-13 NOTE — ASSESSMENT & PLAN NOTE
Rate is well controlled and he is therapeutically and gallbladder. Follow up at Coumadin clinic for INR regulation.

## 2018-11-13 NOTE — PROGRESS NOTES
[id number] Score for Atrial Fibrillation Stroke Risk   Risk   Factors  Component Value   C CHF No 0   H HTN Yes 1   A2 Age >= 76 Yes,  (79 y.o.) 2   D DM No 0   S2 Prior Stroke/TIA No 0   V Vascular Disease No 0   A Age 74-69 No,  (79 y.o.) 0   Sc Sex male 0    [id number]  Score  3   Score last updated 82/08/11 8:18 PM    Click here for a link to the UpToDate guideline \"Atrial Fibrillation: Anticoagulation therapy to prevent embolization    Disclaimer: Risk Score calculation is dependent on accuracy of patient problem list and past encounter diagnosis.

## 2018-11-16 ENCOUNTER — ANTI-COAG VISIT (OUTPATIENT)
Dept: PHARMACY | Age: 78
End: 2018-11-16
Payer: MEDICARE

## 2018-11-16 DIAGNOSIS — I48.91 ATRIAL FIBRILLATION, UNSPECIFIED TYPE (HCC): ICD-10-CM

## 2018-11-16 PROCEDURE — 99212 OFFICE O/P EST SF 10 MIN: CPT

## 2018-11-16 PROCEDURE — 85610 PROTHROMBIN TIME: CPT

## 2018-11-16 PROCEDURE — 36416 COLLJ CAPILLARY BLOOD SPEC: CPT

## 2018-11-16 RX ORDER — WARFARIN SODIUM 2.5 MG/1
TABLET ORAL
Qty: 90 TABLET | Refills: 3 | Status: SHIPPED | OUTPATIENT
Start: 2018-11-16 | End: 2019-08-27 | Stop reason: DRUGHIGH

## 2018-11-16 RX ORDER — WARFARIN SODIUM 1 MG/1
TABLET ORAL
Qty: 36 TABLET | Refills: 3 | Status: SHIPPED | OUTPATIENT
Start: 2018-11-16 | End: 2019-08-27 | Stop reason: DRUGHIGH

## 2018-11-16 NOTE — PROGRESS NOTES
Medication Management Service  [facility]  [phone]    Visit Date: 11/16/2018   Subjective:       Marino Melvin is a 68 y.o. male who presents to clinic today for anticoagulation monitoring and adjustment. Patient seen in clinic for warfarin management due to  Indication:   atrial fibrillation. INR goal: of 2.0-3.0. Duration of therapy: indefinite. Patient reports the following:   Adherent with regimen  Missed or extra doses:  None   Bleeding or thromboembolic side effects:  None  Significant medication changes:  None  Significant dietary changes: Decreased vitamin K intake; plans to return to previous intake. Significant alcohol or tobacco changes: None  Significant recent illness, disease state changes, or hospitalization:  None  Upcoming surgeries or procedures:  None  Falls: None           Assessment and Plan     PT/INR done in office per protocol. INR today is 3.1, slightly above goal range. Plan: Will continue current regimen of warfarin 2.5mg daily except 3.5mg Mondays, Wednesdays, and Fridays. Recheck INR in 4 week(s). E-Rx sent for warfarin 2.5mg tablets #90 with 3 refills and warfarin 1mg tablets #36 with 3 refills to [facility]. Patient verbalized understanding of dosing directions and information discussed. Dosing schedule given to patient including phone number, appointment date, and time. Progress note sent to referring office. Patient acknowledges working in consult agreement with pharmacist as referred by his/her physician.       Electronically signed by Larissa Schmidt [facility] on 11/16/18 at 4:41 PM

## 2018-11-30 DIAGNOSIS — I48.91 ATRIAL FIBRILLATION, UNSPECIFIED TYPE (HCC): Primary | ICD-10-CM

## 2018-12-14 ENCOUNTER — ANTI-COAG VISIT (OUTPATIENT)
Dept: PHARMACY | Age: 78
End: 2018-12-14
Payer: MEDICARE

## 2018-12-14 DIAGNOSIS — I48.91 ATRIAL FIBRILLATION, UNSPECIFIED TYPE (HCC): ICD-10-CM

## 2018-12-14 LAB
INR BLD: 3
POC INR: 3 INDEX
PROTHROMBIN TIME, POC: 36 SECONDS (ref 10–14.3)
PROTIME: 36 SECONDS

## 2018-12-14 PROCEDURE — 36416 COLLJ CAPILLARY BLOOD SPEC: CPT

## 2018-12-14 PROCEDURE — 85610 PROTHROMBIN TIME: CPT

## 2018-12-14 PROCEDURE — 99211 OFF/OP EST MAY X REQ PHY/QHP: CPT

## 2019-01-08 ENCOUNTER — ANTI-COAG VISIT (OUTPATIENT)
Dept: PHARMACY | Age: 79
End: 2019-01-08
Payer: MEDICARE

## 2019-01-08 DIAGNOSIS — I48.91 ATRIAL FIBRILLATION, UNSPECIFIED TYPE (HCC): ICD-10-CM

## 2019-01-08 LAB
INR BLD: 2.8
POC INR: 2.8 INDEX
PROTHROMBIN TIME, POC: 34 SECONDS (ref 10–14.3)
PROTIME: 34 SECONDS

## 2019-01-08 PROCEDURE — 85610 PROTHROMBIN TIME: CPT

## 2019-01-08 PROCEDURE — 36416 COLLJ CAPILLARY BLOOD SPEC: CPT

## 2019-01-08 PROCEDURE — 99211 OFF/OP EST MAY X REQ PHY/QHP: CPT

## 2019-01-11 ENCOUNTER — TELEPHONE (OUTPATIENT)
Dept: PHARMACY | Age: 79
End: 2019-01-11

## 2019-01-18 ENCOUNTER — ANTI-COAG VISIT (OUTPATIENT)
Dept: PHARMACY | Age: 79
End: 2019-01-18
Payer: MEDICARE

## 2019-01-18 DIAGNOSIS — I48.91 ATRIAL FIBRILLATION, UNSPECIFIED TYPE (HCC): ICD-10-CM

## 2019-01-18 LAB
INR BLD: 3.3
POC INR: 3.3 INDEX
PROTHROMBIN TIME, POC: 39.5 SECONDS (ref 10–14.3)
PROTIME: 39.5 SECONDS

## 2019-01-18 PROCEDURE — 85610 PROTHROMBIN TIME: CPT

## 2019-01-18 PROCEDURE — 36416 COLLJ CAPILLARY BLOOD SPEC: CPT

## 2019-01-18 PROCEDURE — 99212 OFFICE O/P EST SF 10 MIN: CPT

## 2019-02-05 ENCOUNTER — ANTI-COAG VISIT (OUTPATIENT)
Dept: PHARMACY | Age: 79
End: 2019-02-05
Payer: MEDICARE

## 2019-02-05 DIAGNOSIS — I48.91 ATRIAL FIBRILLATION, UNSPECIFIED TYPE (HCC): ICD-10-CM

## 2019-02-05 LAB
INR BLD: 2.3
POC INR: 2.3 INDEX
PROTHROMBIN TIME, POC: 27.1 SECONDS (ref 10–14.3)
PROTIME: 27.1 SECONDS

## 2019-02-05 PROCEDURE — 85610 PROTHROMBIN TIME: CPT

## 2019-02-05 PROCEDURE — 36416 COLLJ CAPILLARY BLOOD SPEC: CPT

## 2019-02-05 PROCEDURE — 99211 OFF/OP EST MAY X REQ PHY/QHP: CPT

## 2019-02-08 ENCOUNTER — TELEPHONE (OUTPATIENT)
Dept: PHARMACY | Age: 79
End: 2019-02-08

## 2019-03-05 ENCOUNTER — ANTI-COAG VISIT (OUTPATIENT)
Dept: PHARMACY | Age: 79
End: 2019-03-05
Payer: MEDICARE

## 2019-03-05 DIAGNOSIS — I48.91 ATRIAL FIBRILLATION, UNSPECIFIED TYPE (HCC): ICD-10-CM

## 2019-03-05 LAB
INR BLD: 3.1
POC INR: 3.1 INDEX
PROTHROMBIN TIME, POC: 37.2 SECONDS (ref 10–14.3)
PROTIME: 37.2 SECONDS

## 2019-03-05 PROCEDURE — 99211 OFF/OP EST MAY X REQ PHY/QHP: CPT

## 2019-03-05 PROCEDURE — 85610 PROTHROMBIN TIME: CPT

## 2019-03-05 PROCEDURE — 36416 COLLJ CAPILLARY BLOOD SPEC: CPT

## 2019-03-11 ENCOUNTER — HOSPITAL ENCOUNTER (OUTPATIENT)
Age: 79
Discharge: HOME | End: 2019-03-11
Payer: MEDICARE

## 2019-03-11 LAB
ALBUMIN SERPL-MCNC: 4.5 GM/DL (ref 3.4–5)
ALP BLD-CCNC: 92 IU/L (ref 40–129)
ALT SERPL-CCNC: 15 U/L (ref 10–40)
ANION GAP SERPL CALCULATED.3IONS-SCNC: 8 MMOL/L (ref 4–16)
AST SERPL-CCNC: 18 IU/L (ref 15–37)
BASOPHILS ABSOLUTE: 0.1 K/CU MM
BASOPHILS RELATIVE PERCENT: 1.2 % (ref 0–1)
BILIRUB SERPL-MCNC: 0.7 MG/DL (ref 0–1)
BUN BLDV-MCNC: 26 MG/DL (ref 6–23)
CALCIUM SERPL-MCNC: 9.7 MG/DL (ref 8.3–10.6)
CHLORIDE BLD-SCNC: 99 MMOL/L (ref 99–110)
CHOLESTEROL, FASTING: 186 MG/DL
CO2: 32 MMOL/L (ref 21–32)
CREAT SERPL-MCNC: 1.4 MG/DL (ref 0.9–1.3)
DIFFERENTIAL TYPE: ABNORMAL
EOSINOPHILS ABSOLUTE: 0.2 K/CU MM
EOSINOPHILS RELATIVE PERCENT: 3.1 % (ref 0–3)
GFR AFRICAN AMERICAN: 59 ML/MIN/1.73M2
GFR NON-AFRICAN AMERICAN: 49 ML/MIN/1.73M2
GLUCOSE FASTING: 89 MG/DL (ref 70–99)
HCT VFR BLD CALC: 45.8 % (ref 42–52)
HDLC SERPL-MCNC: 35 MG/DL
HEMOGLOBIN: 14.9 GM/DL (ref 13.5–18)
IMMATURE NEUTROPHIL %: 0.2 % (ref 0–0.43)
LDL CHOLESTEROL DIRECT: 151 MG/DL
LYMPHOCYTES ABSOLUTE: 1.9 K/CU MM
LYMPHOCYTES RELATIVE PERCENT: 33 % (ref 24–44)
MCH RBC QN AUTO: 32.7 PG (ref 27–31)
MCHC RBC AUTO-ENTMCNC: 32.5 % (ref 32–36)
MCV RBC AUTO: 100.7 FL (ref 78–100)
MONOCYTES ABSOLUTE: 0.7 K/CU MM
MONOCYTES RELATIVE PERCENT: 11.7 % (ref 0–4)
PDW BLD-RTO: 12.5 % (ref 11.7–14.9)
PLATELET # BLD: 231 K/CU MM (ref 140–440)
PMV BLD AUTO: 9.5 FL (ref 7.5–11.1)
POTASSIUM SERPL-SCNC: 4.1 MMOL/L (ref 3.5–5.1)
RBC # BLD: 4.55 M/CU MM (ref 4.6–6.2)
SEGMENTED NEUTROPHILS ABSOLUTE COUNT: 2.9 K/CU MM
SEGMENTED NEUTROPHILS RELATIVE PERCENT: 50.8 % (ref 36–66)
SODIUM BLD-SCNC: 139 MMOL/L (ref 135–145)
TOTAL IMMATURE NEUTOROPHIL: 0.01 K/CU MM
TOTAL PROTEIN: 7.3 GM/DL (ref 6.4–8.2)
TRIGLYCERIDE, FASTING: 135 MG/DL
WBC # BLD: 5.7 K/CU MM (ref 4–10.5)

## 2019-03-11 PROCEDURE — 36415 COLL VENOUS BLD VENIPUNCTURE: CPT

## 2019-03-11 PROCEDURE — 85025 COMPLETE CBC W/AUTO DIFF WBC: CPT

## 2019-03-11 PROCEDURE — 80061 LIPID PANEL: CPT

## 2019-03-11 PROCEDURE — 80053 COMPREHEN METABOLIC PANEL: CPT

## 2019-03-11 PROCEDURE — 82274 ASSAY TEST FOR BLOOD FECAL: CPT

## 2019-03-13 LAB
OCCULT BLOOD, FECAL, IA: NEGATIVE
OCCULT BLOOD, FECAL, IA: NORMAL

## 2019-04-02 ENCOUNTER — ANTI-COAG VISIT (OUTPATIENT)
Dept: PHARMACY | Age: 79
End: 2019-04-02
Payer: MEDICARE

## 2019-04-02 DIAGNOSIS — I48.91 ATRIAL FIBRILLATION, UNSPECIFIED TYPE (HCC): ICD-10-CM

## 2019-04-02 LAB
INR BLD: 2.8
POC INR: 2.8 INDEX
POC INR: ABNORMAL INDEX
PROTHROMBIN TIME, POC: 33.6 SECONDS (ref 10–14.3)
PROTIME: 33.6 SECONDS

## 2019-04-02 PROCEDURE — 36416 COLLJ CAPILLARY BLOOD SPEC: CPT

## 2019-04-02 PROCEDURE — 85610 PROTHROMBIN TIME: CPT

## 2019-04-02 PROCEDURE — 99211 OFF/OP EST MAY X REQ PHY/QHP: CPT

## 2019-04-02 NOTE — PROGRESS NOTES
Medication Management Service  [facility]  [phone]    Visit Date: 4/2/2019   Subjective:       Macario Torres is a 66 y.o. male who presents to clinic today for anticoagulation monitoring and adjustment. Patient seen in clinic for warfarin management due to  Indication:   atrial fibrillation. INR goal: of 2.0-3.0. Duration of therapy: indefinite. Patient reports the following:   Adherent with regimen  Missed or extra doses:  None   Bleeding or thromboembolic side effects:  None  Significant medication changes:  None  Significant dietary changes: None  Significant alcohol or tobacco changes: None  Significant recent illness, disease state changes, or hospitalization:  None  Upcoming surgeries or procedures:  None  Falls: None           Assessment and Plan     PT/INR done in office per protocol. INR today is 2.8, therapeutic. Plan: Will continue current regimen of warfarin 2.5mg daily except 3.5mg Mondays, Wednesdays, and Fridays. Recheck INR in 4 week(s). Patient verbalized understanding of dosing directions and information discussed. Dosing schedule given to patient including phone number, appointment date, and time. Progress note sent to referring office. Patient acknowledges working in consult agreement with pharmacist as referred by his/her physician.       Electronically signed by Allyson Garnica [facility] on 4/2/19 at 4:33 PM

## 2019-04-30 ENCOUNTER — ANTI-COAG VISIT (OUTPATIENT)
Dept: PHARMACY | Age: 79
End: 2019-04-30
Payer: MEDICARE

## 2019-04-30 DIAGNOSIS — I48.91 ATRIAL FIBRILLATION, UNSPECIFIED TYPE (HCC): ICD-10-CM

## 2019-04-30 LAB
INR BLD: 3
POC INR: 3 INDEX
POC INR: ABNORMAL INDEX
PROTHROMBIN TIME, POC: 36.1 SECONDS (ref 10–14.3)
PROTIME: 36.1 SECONDS

## 2019-04-30 PROCEDURE — 85610 PROTHROMBIN TIME: CPT

## 2019-04-30 PROCEDURE — 36416 COLLJ CAPILLARY BLOOD SPEC: CPT

## 2019-04-30 PROCEDURE — 99211 OFF/OP EST MAY X REQ PHY/QHP: CPT

## 2019-04-30 NOTE — PROGRESS NOTES
Medication Management Service  [facility]  [phone]    Visit Date: 4/30/2019   Subjective:       Aureliano Hughes is a 66 y.o. male who presents to clinic today for anticoagulation monitoring and adjustment. Patient seen in clinic for warfarin management due to  Indication:   atrial fibrillation. INR goal: of 2.0-3.0. Duration of therapy: indefinite. Patient reports the following:   Adherent with regimen  Missed or extra doses:  None   Bleeding or thromboembolic side effects:  None  Significant medication changes:  None  Significant dietary changes: None  Significant alcohol or tobacco changes: None  Significant recent illness, disease state changes, or hospitalization:  None  Upcoming surgeries or procedures:  None  Falls: None           Assessment and Plan     PT/INR done in office per protocol. INR today is 3, therapeutic. Plan: Will continue current regimen of warfarin 2.5mg daily except 3.5mg Mondays, Wednesdays, and Fridays. Recheck INR in 4 week(s). Patient verbalized understanding of dosing directions and information discussed. Dosing schedule given to patient including phone number, appointment date, and time. Progress note sent to referring office. Patient acknowledges working in consult agreement with pharmacist as referred by his/her physician.       Electronically signed by Quin Sorto [facility] on 4/30/19 at 4:44 PM

## 2019-05-28 ENCOUNTER — ANTI-COAG VISIT (OUTPATIENT)
Dept: PHARMACY | Age: 79
End: 2019-05-28
Payer: MEDICARE

## 2019-05-28 DIAGNOSIS — I48.91 ATRIAL FIBRILLATION, UNSPECIFIED TYPE (HCC): ICD-10-CM

## 2019-05-28 LAB
INR BLD: 2.3
POC INR: 2.3 INDEX
POC INR: ABNORMAL INDEX
PROTHROMBIN TIME, POC: 27.9 SECONDS (ref 10–14.3)
PROTIME: 27.9 SECONDS

## 2019-05-28 PROCEDURE — 99211 OFF/OP EST MAY X REQ PHY/QHP: CPT

## 2019-05-28 PROCEDURE — 36416 COLLJ CAPILLARY BLOOD SPEC: CPT

## 2019-05-28 PROCEDURE — 85610 PROTHROMBIN TIME: CPT

## 2019-05-28 NOTE — PROGRESS NOTES
Medication Management Service  [facility]  [phone]    Visit Date: 5/28/2019   Subjective:       Heath Pena is a 66 y.o. male who presents to clinic today for anticoagulation monitoring and adjustment. Patient seen in clinic for warfarin management due to  Indication:   atrial fibrillation. INR goal: of 2.0-3.0. Duration of therapy: indefinite. Patient reports the following:   Adherent with regimen  Missed or extra doses:  None   Bleeding or thromboembolic side effects:  None  Significant medication changes:  None  Significant dietary changes: None  Significant alcohol or tobacco changes: None  Significant recent illness, disease state changes, or hospitalization:  None  Upcoming surgeries or procedures:  None  Falls: None           Assessment and Plan     PT/INR done in office per protocol. INR today is 2.3, therapeutic. Plan: Will continue current regimen of warfarin 2.5mg daily except 3.5mg Mondays, Wednesdays, and Fridays. Recheck INR in 4 week(s). Patient verbalized understanding of dosing directions and information discussed. Dosing schedule given to patient including phone number, appointment date, and time. Progress note sent to referring office. Patient acknowledges working in consult agreement with pharmacist as referred by his/her physician.       Electronically signed by Ricky Carbajal [facility] on 5/28/19 at 4:39 PM

## 2019-06-07 ENCOUNTER — HOSPITAL ENCOUNTER (OUTPATIENT)
Age: 79
Discharge: HOME | End: 2019-06-07
Payer: MEDICARE

## 2019-06-07 LAB
CREATININE URINE: 125.2 MG/DL (ref 39–259)
ESTIMATED AVERAGE GLUCOSE: 123 MG/DL
HBA1C MFR BLD: 5.9 % (ref 4.2–6.3)
MICROALBUMIN/CREAT 24H UR: 2.5 MG/DL
MICROALBUMIN/CREAT UR-RTO: 20 MG/G CREAT (ref 0–30)

## 2019-06-07 PROCEDURE — 83036 HEMOGLOBIN GLYCOSYLATED A1C: CPT

## 2019-06-07 PROCEDURE — 82570 ASSAY OF URINE CREATININE: CPT

## 2019-06-07 PROCEDURE — 36415 COLL VENOUS BLD VENIPUNCTURE: CPT

## 2019-06-07 PROCEDURE — 82043 UR ALBUMIN QUANTITATIVE: CPT

## 2019-06-25 ENCOUNTER — ANTI-COAG VISIT (OUTPATIENT)
Dept: PHARMACY | Age: 79
End: 2019-06-25
Payer: MEDICARE

## 2019-06-25 DIAGNOSIS — I48.91 ATRIAL FIBRILLATION, UNSPECIFIED TYPE (HCC): ICD-10-CM

## 2019-06-25 LAB
INR BLD: 2.9
POC INR: 2.9 INDEX
POC INR: ABNORMAL INDEX
PROTHROMBIN TIME, POC: 35 SECONDS (ref 10–14.3)
PROTIME: 35 SECONDS

## 2019-06-25 PROCEDURE — 36416 COLLJ CAPILLARY BLOOD SPEC: CPT

## 2019-06-25 PROCEDURE — 99211 OFF/OP EST MAY X REQ PHY/QHP: CPT

## 2019-06-25 PROCEDURE — 85610 PROTHROMBIN TIME: CPT

## 2019-06-25 NOTE — PROGRESS NOTES
Medication Management Service  [facility]  [phone]    Visit Date: 6/25/2019   Subjective:       Molly Ghotra is a 66 y.o. male who presents to clinic today for anticoagulation monitoring and adjustment. Patient seen in clinic for warfarin management due to  Indication:   atrial fibrillation. INR goal: of 2.0-3.0. Duration of therapy: indefinite. Patient reports the following:   Adherent with regimen  Missed or extra doses:  None   Bleeding or thromboembolic side effects:  None  Significant medication changes:  None  Significant dietary changes: Decreased vitamin K intake; plans to return to previous intake. Significant alcohol or tobacco changes: None  Significant recent illness, disease state changes, or hospitalization:  None  Upcoming surgeries or procedures:  None  Falls: None           Assessment and Plan     PT/INR done in office per protocol. INR today is 2.9, therapeutic. Plan: Will continue current regimen of warfarin 2.5mg daily except 3.5mg Mondays, Wednesdays, and Fridays. Recheck INR in 4 week(s). Patient verbalized understanding of dosing directions and information discussed. Dosing schedule given to patient including phone number, appointment date, and time. Progress note sent to referring office. Patient acknowledges working in consult agreement with pharmacist as referred by his/her physician.       Electronically signed by Lionel Wilkinson, [facility] on 6/25/19 at 4:41 PM

## 2019-07-30 ENCOUNTER — ANTI-COAG VISIT (OUTPATIENT)
Dept: PHARMACY | Age: 79
End: 2019-07-30
Payer: MEDICARE

## 2019-07-30 DIAGNOSIS — I48.91 ATRIAL FIBRILLATION, UNSPECIFIED TYPE (HCC): ICD-10-CM

## 2019-07-30 LAB
INR BLD: 2.5
POC INR: 2.5 INDEX
POC INR: ABNORMAL INDEX
PROTHROMBIN TIME, POC: 30.4 SECONDS (ref 10–14.3)
PROTIME: 30.4 SECONDS

## 2019-07-30 PROCEDURE — 99211 OFF/OP EST MAY X REQ PHY/QHP: CPT

## 2019-07-30 PROCEDURE — 36416 COLLJ CAPILLARY BLOOD SPEC: CPT

## 2019-07-30 PROCEDURE — 85610 PROTHROMBIN TIME: CPT

## 2019-07-30 NOTE — PROGRESS NOTES
Medication Management Service  [facility]  [phone]    Visit Date: 7/30/2019   Subjective:       Natali Mckeon is a 66 y.o. male who presents to clinic today for anticoagulation monitoring and adjustment. Patient seen in clinic for warfarin management due to  Indication:   atrial fibrillation. INR goal: of 2.0-3.0. Duration of therapy: indefinite. Patient reports the following:   Adherent with regimen  Missed or extra doses:  None   Bleeding or thromboembolic side effects:  None  Significant medication changes:  None  Significant dietary changes: None  Significant alcohol or tobacco changes: None  Significant recent illness, disease state changes, or hospitalization:  None  Upcoming surgeries or procedures:  None  Falls: None           Assessment and Plan     PT/INR done in office per protocol. INR today is 2.5, therapeutic. Plan: Will continue current regimen of warfarin 2.5mg daily except 3.5mg Mondays, Wednesdays, and Fridays. Recheck INR in 4 week(s). Patient verbalized understanding of dosing directions and information discussed. Dosing schedule given to patient including phone number, appointment date, and time. Progress note sent to referring office. Patient acknowledges working in consult agreement with pharmacist as referred by his/her physician.       Electronically signed by Jimmy Riley [facility] on 7/30/19 at 4:49 PM

## 2019-08-13 ENCOUNTER — TELEPHONE (OUTPATIENT)
Dept: PHARMACY | Age: 79
End: 2019-08-13

## 2019-08-27 ENCOUNTER — ANTI-COAG VISIT (OUTPATIENT)
Dept: PHARMACY | Age: 79
End: 2019-08-27
Payer: MEDICARE

## 2019-08-27 DIAGNOSIS — I48.91 ATRIAL FIBRILLATION, UNSPECIFIED TYPE (HCC): ICD-10-CM

## 2019-08-27 LAB
INR BLD: 3.1
POC INR: 3.1 INDEX
POC INR: ABNORMAL INDEX
PROTHROMBIN TIME, POC: 37.4
PROTHROMBIN TIME, POC: 37.4 SECONDS (ref 10–14.3)

## 2019-08-27 PROCEDURE — 99212 OFFICE O/P EST SF 10 MIN: CPT

## 2019-08-27 PROCEDURE — 36416 COLLJ CAPILLARY BLOOD SPEC: CPT

## 2019-08-27 PROCEDURE — 85610 PROTHROMBIN TIME: CPT

## 2019-08-27 RX ORDER — WARFARIN SODIUM 2.5 MG/1
2.5 TABLET ORAL DAILY
COMMUNITY
End: 2020-07-20 | Stop reason: SDUPTHER

## 2019-08-27 RX ORDER — WARFARIN SODIUM 1 MG/1
2.5 TABLET ORAL DAILY
COMMUNITY
End: 2020-07-20 | Stop reason: SDUPTHER

## 2019-08-30 ENCOUNTER — HOSPITAL ENCOUNTER (OUTPATIENT)
Age: 79
Discharge: HOME | End: 2019-08-30
Payer: MEDICARE

## 2019-08-30 LAB
ALBUMIN SERPL-MCNC: 4.5 GM/DL (ref 3.4–5)
ALP BLD-CCNC: 92 IU/L (ref 40–129)
ALT SERPL-CCNC: 21 U/L (ref 10–40)
ANION GAP SERPL CALCULATED.3IONS-SCNC: 4 MMOL/L (ref 4–16)
AST SERPL-CCNC: 22 IU/L (ref 15–37)
BASOPHILS ABSOLUTE: 0.1 K/CU MM
BASOPHILS RELATIVE PERCENT: 1.5 % (ref 0–1)
BILIRUB SERPL-MCNC: 0.8 MG/DL (ref 0–1)
BUN BLDV-MCNC: 21 MG/DL (ref 6–23)
CALCIUM SERPL-MCNC: 9.7 MG/DL (ref 8.3–10.6)
CHLORIDE BLD-SCNC: 104 MMOL/L (ref 99–110)
CHOLESTEROL, FASTING: 194 MG/DL
CO2: 33 MMOL/L (ref 21–32)
CREAT SERPL-MCNC: 1.2 MG/DL (ref 0.9–1.3)
DIFFERENTIAL TYPE: ABNORMAL
EOSINOPHILS ABSOLUTE: 0.1 K/CU MM
EOSINOPHILS RELATIVE PERCENT: 2.8 % (ref 0–3)
ESTIMATED AVERAGE GLUCOSE: 137 MG/DL
GFR AFRICAN AMERICAN: >60 ML/MIN/1.73M2
GFR NON-AFRICAN AMERICAN: 59 ML/MIN/1.73M2
GLUCOSE FASTING: 106 MG/DL (ref 70–99)
HBA1C MFR BLD: 6.4 % (ref 4.2–6.3)
HCT VFR BLD CALC: 46.5 % (ref 42–52)
HDLC SERPL-MCNC: 32 MG/DL
HEMOGLOBIN: 15.4 GM/DL (ref 13.5–18)
IMMATURE NEUTROPHIL %: 0.2 % (ref 0–0.43)
LDL CHOLESTEROL DIRECT: 142 MG/DL
LYMPHOCYTES ABSOLUTE: 1.7 K/CU MM
LYMPHOCYTES RELATIVE PERCENT: 36.6 % (ref 24–44)
MCH RBC QN AUTO: 33.2 PG (ref 27–31)
MCHC RBC AUTO-ENTMCNC: 33.1 % (ref 32–36)
MCV RBC AUTO: 100.2 FL (ref 78–100)
MONOCYTES ABSOLUTE: 0.5 K/CU MM
MONOCYTES RELATIVE PERCENT: 11.6 % (ref 0–4)
PDW BLD-RTO: 12.8 % (ref 11.7–14.9)
PLATELET # BLD: 225 K/CU MM (ref 140–440)
PMV BLD AUTO: 8.9 FL (ref 7.5–11.1)
POTASSIUM SERPL-SCNC: 4.1 MMOL/L (ref 3.5–5.1)
RBC # BLD: 4.64 M/CU MM (ref 4.6–6.2)
SEGMENTED NEUTROPHILS ABSOLUTE COUNT: 2.2 K/CU MM
SEGMENTED NEUTROPHILS RELATIVE PERCENT: 47.3 % (ref 36–66)
SODIUM BLD-SCNC: 141 MMOL/L (ref 135–145)
TOTAL CK: 118 IU/L (ref 38–174)
TOTAL IMMATURE NEUTOROPHIL: 0.01 K/CU MM
TOTAL PROTEIN: 7.7 GM/DL (ref 6.4–8.2)
TRIGLYCERIDE, FASTING: 118 MG/DL
WBC # BLD: 4.7 K/CU MM (ref 4–10.5)

## 2019-08-30 PROCEDURE — 80053 COMPREHEN METABOLIC PANEL: CPT

## 2019-08-30 PROCEDURE — 82550 ASSAY OF CK (CPK): CPT

## 2019-08-30 PROCEDURE — 36415 COLL VENOUS BLD VENIPUNCTURE: CPT

## 2019-08-30 PROCEDURE — 83036 HEMOGLOBIN GLYCOSYLATED A1C: CPT

## 2019-08-30 PROCEDURE — 80061 LIPID PANEL: CPT

## 2019-08-30 PROCEDURE — 85025 COMPLETE CBC W/AUTO DIFF WBC: CPT

## 2019-09-27 ENCOUNTER — ANTI-COAG VISIT (OUTPATIENT)
Dept: PHARMACY | Age: 79
End: 2019-09-27
Payer: MEDICARE

## 2019-09-27 LAB
INTERNATIONAL NORMALIZATION RATIO, POC: 2.1
POC INR: 2.1 INDEX
POC INR: ABNORMAL INDEX
PROTHROMBIN TIME, POC: 24.7 SECONDS (ref 10–14.3)

## 2019-09-27 PROCEDURE — 85610 PROTHROMBIN TIME: CPT

## 2019-09-27 PROCEDURE — 36416 COLLJ CAPILLARY BLOOD SPEC: CPT

## 2019-09-27 PROCEDURE — 99211 OFF/OP EST MAY X REQ PHY/QHP: CPT

## 2019-10-11 ENCOUNTER — TELEPHONE (OUTPATIENT)
Dept: PHARMACY | Age: 79
End: 2019-10-11

## 2019-10-11 RX ORDER — OXYBUTYNIN CHLORIDE 5 MG/1
5 TABLET, EXTENDED RELEASE ORAL DAILY
COMMUNITY
End: 2019-11-12 | Stop reason: ALTCHOICE

## 2019-10-25 ENCOUNTER — ANTI-COAG VISIT (OUTPATIENT)
Dept: PHARMACY | Age: 79
End: 2019-10-25
Payer: MEDICARE

## 2019-10-25 DIAGNOSIS — I48.91 ATRIAL FIBRILLATION, UNSPECIFIED TYPE (HCC): ICD-10-CM

## 2019-10-25 LAB
INTERNATIONAL NORMALIZATION RATIO, POC: 2.2
POC INR: 2.2 INDEX
POC INR: ABNORMAL INDEX
PROTHROMBIN TIME, POC: 25.8
PROTHROMBIN TIME, POC: 25.8 SECONDS (ref 10–14.3)

## 2019-10-25 PROCEDURE — 85610 PROTHROMBIN TIME: CPT

## 2019-10-25 PROCEDURE — 99211 OFF/OP EST MAY X REQ PHY/QHP: CPT

## 2019-10-25 PROCEDURE — 36416 COLLJ CAPILLARY BLOOD SPEC: CPT

## 2019-10-25 RX ORDER — CETIRIZINE HYDROCHLORIDE 10 MG/1
10 TABLET ORAL DAILY PRN
COMMUNITY

## 2019-11-12 ENCOUNTER — OFFICE VISIT (OUTPATIENT)
Dept: CARDIOLOGY CLINIC | Age: 79
End: 2019-11-12
Payer: MEDICARE

## 2019-11-12 VITALS
DIASTOLIC BLOOD PRESSURE: 72 MMHG | HEIGHT: 71 IN | BODY MASS INDEX: 32.2 KG/M2 | HEART RATE: 74 BPM | SYSTOLIC BLOOD PRESSURE: 134 MMHG | RESPIRATION RATE: 16 BRPM | WEIGHT: 230 LBS

## 2019-11-12 DIAGNOSIS — I10 ESSENTIAL HYPERTENSION: Primary | ICD-10-CM

## 2019-11-12 DIAGNOSIS — I48.91 ATRIAL FIBRILLATION, UNSPECIFIED TYPE (HCC): ICD-10-CM

## 2019-11-12 DIAGNOSIS — I87.2 VENOUS INSUFFICIENCY: ICD-10-CM

## 2019-11-12 PROCEDURE — 4040F PNEUMOC VAC/ADMIN/RCVD: CPT | Performed by: INTERNAL MEDICINE

## 2019-11-12 PROCEDURE — G8484 FLU IMMUNIZE NO ADMIN: HCPCS | Performed by: INTERNAL MEDICINE

## 2019-11-12 PROCEDURE — 1036F TOBACCO NON-USER: CPT | Performed by: INTERNAL MEDICINE

## 2019-11-12 PROCEDURE — 1123F ACP DISCUSS/DSCN MKR DOCD: CPT | Performed by: INTERNAL MEDICINE

## 2019-11-12 PROCEDURE — 93000 ELECTROCARDIOGRAM COMPLETE: CPT | Performed by: INTERNAL MEDICINE

## 2019-11-12 PROCEDURE — 99213 OFFICE O/P EST LOW 20 MIN: CPT | Performed by: INTERNAL MEDICINE

## 2019-11-12 PROCEDURE — G8427 DOCREV CUR MEDS BY ELIG CLIN: HCPCS | Performed by: INTERNAL MEDICINE

## 2019-11-12 PROCEDURE — G8417 CALC BMI ABV UP PARAM F/U: HCPCS | Performed by: INTERNAL MEDICINE

## 2019-11-26 ENCOUNTER — ANTI-COAG VISIT (OUTPATIENT)
Dept: PHARMACY | Age: 79
End: 2019-11-26
Payer: MEDICARE

## 2019-11-26 DIAGNOSIS — I48.91 ATRIAL FIBRILLATION, UNSPECIFIED TYPE (HCC): ICD-10-CM

## 2019-11-26 LAB
INR BLD: 2.6
POC INR: 2.6 INDEX
POC INR: ABNORMAL INDEX
PROTHROMBIN TIME, POC: 30.9 SECONDS (ref 10–14.3)
PROTIME: 30.9 SECONDS

## 2019-11-26 PROCEDURE — 99211 OFF/OP EST MAY X REQ PHY/QHP: CPT

## 2019-11-26 PROCEDURE — 85610 PROTHROMBIN TIME: CPT

## 2019-11-26 PROCEDURE — 36416 COLLJ CAPILLARY BLOOD SPEC: CPT

## 2019-11-26 RX ORDER — OXYBUTYNIN CHLORIDE 5 MG/1
5 TABLET, EXTENDED RELEASE ORAL EVERY EVENING
COMMUNITY

## 2019-12-10 ENCOUNTER — HOSPITAL ENCOUNTER (OUTPATIENT)
Age: 79
Discharge: HOME | End: 2019-12-10
Payer: MEDICARE

## 2019-12-10 LAB
ESTIMATED AVERAGE GLUCOSE: 128 MG/DL
HBA1C MFR BLD: 6.1 % (ref 4.2–6.3)

## 2019-12-10 PROCEDURE — 36415 COLL VENOUS BLD VENIPUNCTURE: CPT

## 2019-12-10 PROCEDURE — 83036 HEMOGLOBIN GLYCOSYLATED A1C: CPT

## 2019-12-27 ENCOUNTER — ANTI-COAG VISIT (OUTPATIENT)
Dept: PHARMACY | Age: 79
End: 2019-12-27
Payer: MEDICARE

## 2019-12-27 DIAGNOSIS — I48.91 ATRIAL FIBRILLATION, UNSPECIFIED TYPE (HCC): ICD-10-CM

## 2019-12-27 LAB
INR BLD: 2.6
POC INR: 2.6 INDEX
POC INR: ABNORMAL INDEX
PROTHROMBIN TIME, POC: 31.2 SECONDS (ref 10–14.3)
PROTIME: 31.2 SECONDS

## 2019-12-27 PROCEDURE — 85610 PROTHROMBIN TIME: CPT

## 2019-12-27 PROCEDURE — 36416 COLLJ CAPILLARY BLOOD SPEC: CPT

## 2019-12-27 PROCEDURE — 99211 OFF/OP EST MAY X REQ PHY/QHP: CPT

## 2020-01-13 ENCOUNTER — HOSPITAL ENCOUNTER (OUTPATIENT)
Age: 80
Discharge: HOME | End: 2020-01-13
Payer: MEDICARE

## 2020-01-13 LAB — PROSTATE SPECIFIC ANTIGEN: 3.45 NG/ML (ref 0–4)

## 2020-01-13 PROCEDURE — 36415 COLL VENOUS BLD VENIPUNCTURE: CPT

## 2020-01-13 PROCEDURE — G0103 PSA SCREENING: HCPCS

## 2020-01-24 ENCOUNTER — ANTI-COAG VISIT (OUTPATIENT)
Dept: PHARMACY | Age: 80
End: 2020-01-24
Payer: MEDICARE

## 2020-01-24 LAB
INR BLD: 2.3
POC INR: 2.3 INDEX
POC INR: ABNORMAL INDEX
PROTHROMBIN TIME, POC: 27.6 SECONDS (ref 10–14.3)
PROTIME: 27.6 SECONDS

## 2020-01-24 PROCEDURE — 99211 OFF/OP EST MAY X REQ PHY/QHP: CPT

## 2020-01-24 PROCEDURE — 85610 PROTHROMBIN TIME: CPT

## 2020-01-24 PROCEDURE — 36416 COLLJ CAPILLARY BLOOD SPEC: CPT

## 2020-01-24 NOTE — PROGRESS NOTES
Medication Management Service  [facility]  [phone]    Visit Date: 1/24/2020   Subjective:       Cedric Lyon is a 78 y.o. male who presents to clinic today for anticoagulation monitoring and adjustment. Patient seen in clinic for warfarin management due to  Indication:   atrial fibrillation. INR goal: of 2.0-3.0. Duration of therapy: indefinite. Patient reports the following:   Adherent with regimen  Missed or extra doses:  None   Bleeding or thromboembolic side effects:  None  Significant medication changes:  None  Significant dietary changes: None  Significant alcohol or tobacco changes: None  Significant recent illness, disease state changes, or hospitalization:  None  Upcoming surgeries or procedures:  None  Falls: None           Assessment and Plan     PT/INR done in office per protocol. INR today is 2.3, therapeutic. Plan: Will continue current regimen of warfarin 2.5mg daily except 3.5mg Mondays and Fridays. Recheck INR in 6 week(s). Patient verbalized understanding of dosing directions and information discussed. Dosing schedule given to patient including phone number, appointment date, and time. Progress note sent to referring office. Patient acknowledges working in consult agreement with pharmacist as referred by his/her physician.       Electronically signed by Joe Boogie [place] [facility] on 1/24/20 at 4:43 PM

## 2020-03-06 ENCOUNTER — ANTI-COAG VISIT (OUTPATIENT)
Dept: PHARMACY | Age: 80
End: 2020-03-06
Payer: MEDICARE

## 2020-03-06 LAB
INTERNATIONAL NORMALIZATION RATIO, POC: 2.7
POC INR: 2.7 INDEX
POC INR: ABNORMAL INDEX
PROTHROMBIN TIME, POC: 31.8 SECONDS (ref 10–14.3)

## 2020-03-06 PROCEDURE — 36416 COLLJ CAPILLARY BLOOD SPEC: CPT

## 2020-03-06 PROCEDURE — 99211 OFF/OP EST MAY X REQ PHY/QHP: CPT

## 2020-03-06 PROCEDURE — 85610 PROTHROMBIN TIME: CPT

## 2020-03-16 ENCOUNTER — TELEPHONE (OUTPATIENT)
Dept: PHARMACY | Age: 80
End: 2020-03-16

## 2020-03-16 NOTE — TELEPHONE ENCOUNTER
Rescheduled patient for 5/8/2020 to reduce possible exposure to COVID-19.     Kem Ghotra, PharmD, [facility]  3/16/2020  10:16 AM

## 2020-03-20 ENCOUNTER — TELEPHONE (OUTPATIENT)
Dept: CARDIOLOGY CLINIC | Age: 80
End: 2020-03-20

## 2020-03-23 NOTE — TELEPHONE ENCOUNTER
Faxed this telephone encounter with Dr. Wilson Lively response to [address] [facility], Att:  Quin at fax # [phone].

## 2020-05-01 ENCOUNTER — TELEPHONE (OUTPATIENT)
Dept: PHARMACY | Age: 80
End: 2020-05-01

## 2020-05-01 NOTE — TELEPHONE ENCOUNTER
Patient states she and her  are uncomfortable leaving their home at this time. Rescheduled for 6/2/2020.     CLINICAL PHARMACY CONSULT: MED RECONCILIATION/REVIEW ADDENDUM    For Pharmacy Admin Tracking Only    PHSO: No  Total # of Interventions Recommended: 0  Total Interventions Accepted: 0  Time Spent (min): 5    Sadaf Rausch, JeD

## 2020-05-29 ENCOUNTER — TELEPHONE (OUTPATIENT)
Dept: PHARMACY | Age: 80
End: 2020-05-29

## 2020-06-01 ENCOUNTER — TELEPHONE (OUTPATIENT)
Dept: PHARMACY | Age: 80
End: 2020-06-01

## 2020-06-01 NOTE — TELEPHONE ENCOUNTER
Left VM to call clinic for screening prior to lab visit.      CLINICAL PHARMACY CONSULT: MED RECONCILIATION/REVIEW ADDENDUM    For Pharmacy Admin Tracking Only    PHSO: No  Total # of Interventions Recommended: 0  Total Interventions Accepted: 0  Time Spent (min): 5    Toni Hurd, PharmD

## 2020-06-02 ENCOUNTER — ANTI-COAG VISIT (OUTPATIENT)
Dept: PHARMACY | Age: 80
End: 2020-06-02
Payer: MEDICARE

## 2020-06-02 ENCOUNTER — HOSPITAL ENCOUNTER (OUTPATIENT)
Age: 80
Discharge: HOME | End: 2020-06-02
Payer: MEDICARE

## 2020-06-02 LAB
ALBUMIN SERPL-MCNC: 4.5 GM/DL (ref 3.4–5)
ALP BLD-CCNC: 80 IU/L (ref 40–129)
ALT SERPL-CCNC: 17 U/L (ref 10–40)
ANION GAP SERPL CALCULATED.3IONS-SCNC: 10 MMOL/L (ref 4–16)
AST SERPL-CCNC: 23 IU/L (ref 15–37)
BASOPHILS ABSOLUTE: 0.1 K/CU MM
BASOPHILS RELATIVE PERCENT: 1.1 % (ref 0–1)
BILIRUB SERPL-MCNC: 0.9 MG/DL (ref 0–1)
BUN BLDV-MCNC: 21 MG/DL (ref 6–23)
CALCIUM SERPL-MCNC: 9.6 MG/DL (ref 8.3–10.6)
CHLORIDE BLD-SCNC: 101 MMOL/L (ref 99–110)
CHOLESTEROL, FASTING: 184 MG/DL
CO2: 29 MMOL/L (ref 21–32)
CREAT SERPL-MCNC: 1.3 MG/DL (ref 0.9–1.3)
CREATININE URINE: 191.8 MG/DL (ref 39–259)
DIFFERENTIAL TYPE: ABNORMAL
EOSINOPHILS ABSOLUTE: 0.2 K/CU MM
EOSINOPHILS RELATIVE PERCENT: 3.5 % (ref 0–3)
ESTIMATED AVERAGE GLUCOSE: 131 MG/DL
GFR AFRICAN AMERICAN: >60 ML/MIN/1.73M2
GFR NON-AFRICAN AMERICAN: 53 ML/MIN/1.73M2
GLUCOSE FASTING: 92 MG/DL (ref 70–99)
HBA1C MFR BLD: 6.2 % (ref 4.2–6.3)
HCT VFR BLD CALC: 51.5 % (ref 42–52)
HDLC SERPL-MCNC: 33 MG/DL
HEMOGLOBIN: 16.6 GM/DL (ref 13.5–18)
IMMATURE NEUTROPHIL %: 0.2 % (ref 0–0.43)
INR BLD: 2.43 INDEX
LDL CHOLESTEROL DIRECT: 130 MG/DL
LYMPHOCYTES ABSOLUTE: 2.1 K/CU MM
LYMPHOCYTES RELATIVE PERCENT: 39.1 % (ref 24–44)
MCH RBC QN AUTO: 32.8 PG (ref 27–31)
MCHC RBC AUTO-ENTMCNC: 32.2 % (ref 32–36)
MCV RBC AUTO: 101.8 FL (ref 78–100)
MICROALBUMIN/CREAT 24H UR: 4.1 MG/DL
MICROALBUMIN/CREAT UR-RTO: 21.4 MG/G CREAT (ref 0–30)
MONOCYTES ABSOLUTE: 0.6 K/CU MM
MONOCYTES RELATIVE PERCENT: 10.9 % (ref 0–4)
PDW BLD-RTO: 12.6 % (ref 11.7–14.9)
PLATELET # BLD: 204 K/CU MM (ref 140–440)
PMV BLD AUTO: 9.7 FL (ref 7.5–11.1)
POTASSIUM SERPL-SCNC: 3.9 MMOL/L (ref 3.5–5.1)
PROTHROMBIN TIME: 28.2 SECONDS (ref 11.7–14.5)
RBC # BLD: 5.06 M/CU MM (ref 4.6–6.2)
SEGMENTED NEUTROPHILS ABSOLUTE COUNT: 2.4 K/CU MM
SEGMENTED NEUTROPHILS RELATIVE PERCENT: 45.2 % (ref 36–66)
SODIUM BLD-SCNC: 140 MMOL/L (ref 135–145)
TOTAL CK: 166 IU/L (ref 38–174)
TOTAL IMMATURE NEUTOROPHIL: 0.01 K/CU MM
TOTAL PROTEIN: 7.6 GM/DL (ref 6.4–8.2)
TRIGLYCERIDE, FASTING: 150 MG/DL
WBC # BLD: 5.4 K/CU MM (ref 4–10.5)

## 2020-06-02 PROCEDURE — 80061 LIPID PANEL: CPT

## 2020-06-02 PROCEDURE — 82043 UR ALBUMIN QUANTITATIVE: CPT

## 2020-06-02 PROCEDURE — 83036 HEMOGLOBIN GLYCOSYLATED A1C: CPT

## 2020-06-02 PROCEDURE — 82570 ASSAY OF URINE CREATININE: CPT

## 2020-06-02 PROCEDURE — 85025 COMPLETE CBC W/AUTO DIFF WBC: CPT

## 2020-06-02 PROCEDURE — 85610 PROTHROMBIN TIME: CPT

## 2020-06-02 PROCEDURE — 99212 OFFICE O/P EST SF 10 MIN: CPT

## 2020-06-02 PROCEDURE — 36415 COLL VENOUS BLD VENIPUNCTURE: CPT

## 2020-06-02 PROCEDURE — 99211 OFF/OP EST MAY X REQ PHY/QHP: CPT

## 2020-06-02 PROCEDURE — 82550 ASSAY OF CK (CPK): CPT

## 2020-06-02 PROCEDURE — 80053 COMPREHEN METABOLIC PANEL: CPT

## 2020-06-02 RX ORDER — WARFARIN SODIUM 2.5 MG/1
TABLET ORAL
Qty: 90 TABLET | Refills: 1 | Status: SHIPPED | OUTPATIENT
Start: 2020-06-02 | End: 2020-07-20 | Stop reason: SDUPTHER

## 2020-06-02 NOTE — TELEPHONE ENCOUNTER
Patient's wife returned call last night. Called back this morning. Recent Travel Screening and Travel History documentation:     Travel Screening       Question Response     Do you have any of the following symptoms? None of these     In the last month, have you been in contact with someone who was confirmed or suspected to have Coronavirus / COVID-19? No / Unsure     Have you traveled internationally in the last month? No      Travel History   Travel since 05/02/20     No documented travel since 05/02/20         Patient accepted that for purposes of billing, this is a virtual visit with your provider for which we will submit a claim for reimbursement with your insurance company. You may be responsible for any copays, coinsurance amounts or other amounts not covered by your insurance company.      CLINICAL PHARMACY CONSULT: MED RECONCILIATION/REVIEW ADDENDUM    For Pharmacy Admin Tracking Only    PHSO: No  Total # of Interventions Recommended: 0  Total Interventions Accepted: 0  Time Spent (min): 5    Madison Noble, PharmD

## 2020-07-20 ENCOUNTER — TELEPHONE (OUTPATIENT)
Dept: PHARMACY | Age: 80
End: 2020-07-20

## 2020-07-20 RX ORDER — WARFARIN SODIUM 1 MG/1
1 TABLET ORAL DAILY
Qty: 30 TABLET | Refills: 1 | Status: SHIPPED | OUTPATIENT
Start: 2020-07-20 | End: 2021-03-22

## 2020-07-20 RX ORDER — WARFARIN SODIUM 2.5 MG/1
2.5 TABLET ORAL DAILY
Qty: 90 TABLET | Refills: 1 | Status: SHIPPED | OUTPATIENT
Start: 2020-07-20 | End: 2021-08-05

## 2020-07-20 NOTE — TELEPHONE ENCOUNTER
Refill requested at [address] [place] [address]. Patient requests 2.5mg tabs but will run out of 1mg in a couple weeks. Refills sent for 2.5mg and 1mg. 90 day supply requested.     CLINICAL PHARMACY CONSULT: MED RECONCILIATION/REVIEW ADDENDUM    For Pharmacy Admin Tracking Only    PHSO: No  Total # of Interventions Recommended: 0  - Refills Provided #: 2  Total Interventions Accepted: 0  Time Spent (min): 15    Yordy Head, PharmD

## 2020-08-24 ENCOUNTER — TELEPHONE (OUTPATIENT)
Dept: PHARMACY | Age: 80
End: 2020-08-24

## 2020-08-24 NOTE — TELEPHONE ENCOUNTER
Recent Travel Screening and Travel History documentation:     Travel Screening     Question   Response    In the last month, have you been in contact with someone who was confirmed or suspected to have Coronavirus / COVID-19? No / Unsure    Do you have any of the following symptoms? None of these    Have you traveled internationally in the last month?   No      Travel History   Travel since 07/24/20     No documented travel since 07/24/20

## 2020-08-25 ENCOUNTER — ANTI-COAG VISIT (OUTPATIENT)
Dept: PHARMACY | Age: 80
End: 2020-08-25
Payer: MEDICARE

## 2020-08-25 LAB
INR BLD: 2.55 INDEX
PROTHROMBIN TIME: 29.6 SECONDS (ref 11.7–14.5)

## 2020-08-25 PROCEDURE — 36415 COLL VENOUS BLD VENIPUNCTURE: CPT

## 2020-08-25 PROCEDURE — 99211 OFF/OP EST MAY X REQ PHY/QHP: CPT

## 2020-08-25 PROCEDURE — 85610 PROTHROMBIN TIME: CPT

## 2020-08-25 NOTE — PROGRESS NOTES
Medication Management Service  [address] [phone]    Visit Date: 8/25/2020   Subjective: All appointments have been changed to telephone visits at this time due to COVID-19. Tayler Tavares is a 78 y.o. male who is having INR checked by [facility]. INR results were sent to the clinic for anticoagulation monitoring and adjustment. Patient results called in to clinic for warfarin management due to  Indication:   atrial fibrillation. INR goal: of 2.0-3.0. Duration of therapy: indefinite. Patient reports the following:   Adherent with regimen  Missed or extra doses:  None   Bleeding or thromboembolic side effects:  None  Significant medication changes:  None  Significant dietary changes: None  Significant alcohol or tobacco changes: None  Significant recent illness, disease state changes, or hospitalization:  None  Upcoming surgeries or procedures:  None  Falls: None           Assessment and Plan     PT/INR performed by Lab. INR today is 2.55, therapeutic. Plan: Will continue current regimen of warfarin 2.5mg daily except 3.5mg Mondays and Fridays. Recheck INR in 12 week(s). Patient has standing lab orders for PT/INR. Patient verbalized understanding of dosing directions and information discussed. Progress note sent to referring office. Patient acknowledges working in consult agreement with pharmacist as referred by his/her physician. Patient accepted that for purposes of billing, this is a virtual visit with your provider for which we will submit a claim for reimbursement with your insurance company. You may be responsible for any copays, coinsurance amounts or other amounts not covered by your insurance company.      Electronically signed by Benji Shaikh, [address] [facility] on 8/25/20 at 11:38 AM EDT    CLINICAL PHARMACY CONSULT: MED RECONCILIATION/REVIEW Tim  22. Tracking Only    PHSO: No  Total # of Interventions Recommended: 0  - Maintenance Safety Lab Monitoring #: 1  Total Interventions Accepted: 0  Time Spent (min): 15    Je RalphD

## 2020-09-18 ENCOUNTER — HOSPITAL ENCOUNTER (OUTPATIENT)
Age: 80
Discharge: HOME | End: 2020-09-18
Payer: MEDICARE

## 2020-09-18 LAB
ALBUMIN SERPL-MCNC: 4.3 GM/DL (ref 3.4–5)
ALP BLD-CCNC: 82 IU/L (ref 40–129)
ALT SERPL-CCNC: 12 U/L (ref 10–40)
ANION GAP SERPL CALCULATED.3IONS-SCNC: 7 MMOL/L (ref 4–16)
AST SERPL-CCNC: 21 IU/L (ref 15–37)
BASOPHILS ABSOLUTE: 0.1 K/CU MM
BASOPHILS RELATIVE PERCENT: 1.3 % (ref 0–1)
BILIRUB SERPL-MCNC: 0.7 MG/DL (ref 0–1)
BUN BLDV-MCNC: 27 MG/DL (ref 6–23)
CALCIUM SERPL-MCNC: 9.8 MG/DL (ref 8.3–10.6)
CHLORIDE BLD-SCNC: 103 MMOL/L (ref 99–110)
CHOLESTEROL, FASTING: 186 MG/DL
CO2: 31 MMOL/L (ref 21–32)
CREAT SERPL-MCNC: 1.3 MG/DL (ref 0.9–1.3)
DIFFERENTIAL TYPE: ABNORMAL
EOSINOPHILS ABSOLUTE: 0.2 K/CU MM
EOSINOPHILS RELATIVE PERCENT: 4.3 % (ref 0–3)
ESTIMATED AVERAGE GLUCOSE: 134 MG/DL
GFR AFRICAN AMERICAN: >60 ML/MIN/1.73M2
GFR NON-AFRICAN AMERICAN: 53 ML/MIN/1.73M2
GLUCOSE FASTING: 92 MG/DL (ref 70–99)
HBA1C MFR BLD: 6.3 % (ref 4.2–6.3)
HCT VFR BLD CALC: 48.9 % (ref 42–52)
HDLC SERPL-MCNC: 34 MG/DL
HEMOGLOBIN: 15.7 GM/DL (ref 13.5–18)
IMMATURE NEUTROPHIL %: 0.2 % (ref 0–0.43)
LDL CHOLESTEROL DIRECT: 127 MG/DL
LYMPHOCYTES ABSOLUTE: 1.9 K/CU MM
LYMPHOCYTES RELATIVE PERCENT: 35.9 % (ref 24–44)
MCH RBC QN AUTO: 32.6 PG (ref 27–31)
MCHC RBC AUTO-ENTMCNC: 32.1 % (ref 32–36)
MCV RBC AUTO: 101.7 FL (ref 78–100)
MONOCYTES ABSOLUTE: 0.6 K/CU MM
MONOCYTES RELATIVE PERCENT: 10.8 % (ref 0–4)
PDW BLD-RTO: 12.5 % (ref 11.7–14.9)
PLATELET # BLD: 221 K/CU MM (ref 140–440)
PMV BLD AUTO: 9.4 FL (ref 7.5–11.1)
POTASSIUM SERPL-SCNC: 4 MMOL/L (ref 3.5–5.1)
RBC # BLD: 4.81 M/CU MM (ref 4.6–6.2)
SEGMENTED NEUTROPHILS ABSOLUTE COUNT: 2.6 K/CU MM
SEGMENTED NEUTROPHILS RELATIVE PERCENT: 47.5 % (ref 36–66)
SODIUM BLD-SCNC: 141 MMOL/L (ref 135–145)
TOTAL CK: 122 IU/L (ref 38–174)
TOTAL IMMATURE NEUTOROPHIL: 0.01 K/CU MM
TOTAL PROTEIN: 7.3 GM/DL (ref 6.4–8.2)
TRIGLYCERIDE, FASTING: 131 MG/DL
WBC # BLD: 5.4 K/CU MM (ref 4–10.5)

## 2020-09-18 PROCEDURE — 83036 HEMOGLOBIN GLYCOSYLATED A1C: CPT

## 2020-09-18 PROCEDURE — 82550 ASSAY OF CK (CPK): CPT

## 2020-09-18 PROCEDURE — 80061 LIPID PANEL: CPT

## 2020-09-18 PROCEDURE — 85025 COMPLETE CBC W/AUTO DIFF WBC: CPT

## 2020-09-18 PROCEDURE — 36415 COLL VENOUS BLD VENIPUNCTURE: CPT

## 2020-09-18 PROCEDURE — 80053 COMPREHEN METABOLIC PANEL: CPT

## 2020-09-19 ENCOUNTER — HOSPITAL ENCOUNTER (OUTPATIENT)
Age: 80
Setting detail: SPECIMEN
Discharge: HOME | End: 2020-09-19
Payer: MEDICARE

## 2020-09-19 PROCEDURE — G0328 FECAL BLOOD SCRN IMMUNOASSAY: HCPCS

## 2020-09-19 PROCEDURE — 82274 ASSAY TEST FOR BLOOD FECAL: CPT

## 2020-09-23 LAB — OCCULT BLOOD, FECAL, IA: POSITIVE

## 2020-10-08 ENCOUNTER — OFFICE VISIT (OUTPATIENT)
Dept: SURGERY | Age: 80
End: 2020-10-08
Payer: MEDICARE

## 2020-10-08 VITALS
BODY MASS INDEX: 32.2 KG/M2 | DIASTOLIC BLOOD PRESSURE: 74 MMHG | TEMPERATURE: 97.9 F | HEIGHT: 71 IN | HEART RATE: 80 BPM | SYSTOLIC BLOOD PRESSURE: 128 MMHG | WEIGHT: 230 LBS

## 2020-10-08 PROCEDURE — 1123F ACP DISCUSS/DSCN MKR DOCD: CPT | Performed by: PHYSICIAN ASSISTANT

## 2020-10-08 PROCEDURE — 4040F PNEUMOC VAC/ADMIN/RCVD: CPT | Performed by: PHYSICIAN ASSISTANT

## 2020-10-08 PROCEDURE — 99202 OFFICE O/P NEW SF 15 MIN: CPT | Performed by: PHYSICIAN ASSISTANT

## 2020-10-08 PROCEDURE — 1036F TOBACCO NON-USER: CPT | Performed by: PHYSICIAN ASSISTANT

## 2020-10-08 PROCEDURE — G8427 DOCREV CUR MEDS BY ELIG CLIN: HCPCS | Performed by: PHYSICIAN ASSISTANT

## 2020-10-08 PROCEDURE — G8417 CALC BMI ABV UP PARAM F/U: HCPCS | Performed by: PHYSICIAN ASSISTANT

## 2020-10-08 PROCEDURE — G8484 FLU IMMUNIZE NO ADMIN: HCPCS | Performed by: PHYSICIAN ASSISTANT

## 2020-10-08 RX ORDER — SODIUM, POTASSIUM,MAG SULFATES 17.5-3.13G
177 SOLUTION, RECONSTITUTED, ORAL ORAL SEE ADMIN INSTRUCTIONS
Qty: 1 KIT | Refills: 0 | Status: SHIPPED | OUTPATIENT
Start: 2020-10-08 | End: 2020-12-21 | Stop reason: ALTCHOICE

## 2020-10-08 RX ORDER — TIZANIDINE 2 MG/1
2 TABLET ORAL EVERY 6 HOURS PRN
COMMUNITY
End: 2022-02-21

## 2020-10-08 ASSESSMENT — ENCOUNTER SYMPTOMS
EYE REDNESS: 0
STRIDOR: 0
SORE THROAT: 0
CONSTIPATION: 1
ABDOMINAL PAIN: 0
RECTAL PAIN: 0
NAUSEA: 0
PHOTOPHOBIA: 0
BLOOD IN STOOL: 1
DIARRHEA: 1
ANAL BLEEDING: 0
CHOKING: 0
COLOR CHANGE: 0
EYE ITCHING: 0
BACK PAIN: 0
ABDOMINAL DISTENTION: 0
APNEA: 0
VOMITING: 0

## 2020-10-08 NOTE — PROGRESS NOTES
Chief Complaint   Patient presents with    Surgical Consult     need for c--scope (positive hemoccult) last c-scope when Andom was with [facility]. SUBJECTIVE:  HPI: Pt presents today for evaluation and possible need of colonoscopy. Pt has had colonoscopy in the past, but pt is unsure of when it was - though is able to tell me that Dr. Harry Parks performed the procedure. Patient has been experiencing intermittent blood in his stools (about once per 4-6 weeks) - is unsure if he has hemorrhoids, over the past year, has had inconsistent BMs that vascillate between normal, diarrhea and constipation. Denies abd pain, N/V, distention, or unintended weight loss. There is no known family history of colon cancer. His mother did have abdominal cancer, but pt is unknown if it was specific to the colon. I have reviewed the patient's (pertinent information to this visit) medical history, family history(scanned in  the Media tab under \"patient questioner\"), social history and review of systems with the patient today in the office. Past Surgical History:   Procedure Laterality Date    CARDIOVERSION  3/6/2012         CATARACT REMOVAL      right eye    CHOLECYSTECTOMY      CYST REMOVAL      HEMORRHOID SURGERY      HERNIA REPAIR      PROSTATE CRYOABLATION  09/22/2016    TOOTH EXTRACTION       Past Medical History:   Diagnosis Date    Atrial flutter ([address]     H/O Doppler ultrasound 10/26/15    Peripheral vascular.  H/O echocardiogram 2/7/12    Mild concentric LVH, mild RV and aortic root dilatation, mild tricuspid regurg. w/pulmonary HTN, EF 50-55%.  H/O myocardial perfusion scan 2/7/12    Lexiscan Thallium. Normal perfusion, EF 44%. Patient is in atrial flutter w/rapid rate.  Hypertension     Varicose veins 11/17/2015    Both lower extremities left>right     Venous insufficiency 11/14/2017     No family history on file.   Social History     Socioeconomic History    Marital status:  Spouse name: Not on file    Number of children: Not on file    Years of education: Not on file    Highest education level: Not on file   Occupational History    Occupation: Retired   Social Needs    Financial resource strain: Not on file    Food insecurity     Worry: Not on file     Inability: Not on file   [employer] needs     Medical: Not on file     Non-medical: Not on file   Tobacco Use    Smoking status: Former Smoker     Years: 40.00     Last attempt to quit: 2000     Years since quittin.7    Smokeless tobacco: Never Used    Tobacco comment: smoked pipe   Substance and Sexual Activity    Alcohol use: No    Drug use: No    Sexual activity: Yes     Partners: Female     Comment: marriied   Lifestyle    Physical activity     Days per week: Not on file     Minutes per session: Not on file    Stress: Not on file   Relationships    Social connections     Talks on phone: Not on file     Gets together: Not on file     Attends Advent service: Not on file     Active member of club or organization: Not on file     Attends meetings of clubs or organizations: Not on file     Relationship status: Not on file    Intimate partner violence     Fear of current or ex partner: Not on file     Emotionally abused: Not on file     Physically abused: Not on file     Forced sexual activity: Not on file   Other Topics Concern    Not on file   Social History Narrative    Not on file       Current Outpatient Medications   Medication Sig Dispense Refill    Na Sulfate-K Sulfate-Mg Sulf 17.5-3.13-1.6 GM/177ML SOLN Take 177 mLs by mouth See Admin Instructions 1 kit 0    tiZANidine (ZANAFLEX) 2 MG tablet Take 2 mg by mouth every 6 hours as needed      warfarin (COUMADIN) 2.5 MG tablet Take 1 tablet by mouth daily 90 tablet 1    warfarin (COUMADIN) 1 MG tablet Take 1 tablet by mouth daily On  and  30 tablet 1    oxybutynin (DITROPAN-XL) 5 MG extended release tablet Take 5 mg by mouth every evening      cetirizine (ZYRTEC) 10 MG tablet Take 10 mg by mouth daily as needed for Allergies      acetaminophen (ARTHRITIS PAIN) 650 MG extended release tablet Take 650 mg by mouth every 8 hours as needed for Pain      olmesartan (BENICAR) 5 MG tablet Take 5 mg by mouth 2 times daily      hydrochlorothiazide (HYDRODIURIL) 25 MG tablet Take 25 mg by mouth daily      metoprolol (LOPRESSOR) 50 MG tablet Take 1 tablet by mouth 2 times daily 60 tablet 6     No current facility-administered medications for this visit. Allergies   Allergen Reactions    Latex Rash    Dicyclomine     Neosporin [Bacitracin-Neomycin-Polymyxin] Rash       Review of Systems:         Review of Systems   Constitutional: Negative for chills and fever. HENT: Negative for ear pain, mouth sores, sore throat and tinnitus. Eyes: Negative for photophobia, redness and itching. Respiratory: Negative for apnea, choking and stridor. Cardiovascular: Negative for chest pain and palpitations. Gastrointestinal: Positive for blood in stool, constipation (intermittent) and diarrhea (intermittent). Negative for abdominal distention, abdominal pain, anal bleeding, nausea, rectal pain and vomiting. Endocrine: Negative for polydipsia. Genitourinary: Negative for enuresis, flank pain and hematuria. Musculoskeletal: Negative for back pain, joint swelling and myalgias. Skin: Negative for color change and pallor. Allergic/Immunologic: Negative for environmental allergies. Neurological: Negative for syncope and speech difficulty. Psychiatric/Behavioral: Negative for confusion and hallucinations. OBJECTIVE:  Physical Exam:    /74   Pulse 80   Temp 97.9 °F (36.6 °C) (Infrared)   Ht 5' 11\" (1.803 m)   Wt 230 lb (104.3 kg)   BMI 32.08 kg/m²      Physical Exam  Constitutional:       Appearance: He is well-developed. HENT:      Head: Normocephalic. Eyes:      Pupils: Pupils are equal, round, and reactive to light. Neck:      Musculoskeletal: Normal range of motion and neck supple. Cardiovascular:      Rate and Rhythm: Normal rate. Pulmonary:      Effort: Pulmonary effort is normal.   Abdominal:      General: There is no distension. Palpations: Abdomen is soft. There is no mass. Tenderness: There is no abdominal tenderness. There is no guarding or rebound. Musculoskeletal: Normal range of motion. Skin:     General: Skin is warm. Neurological:      Mental Status: He is alert and oriented to person, place, and time. ASSESSMENT:  1. Occult blood positive stool    2. Pre-op testing          PLAN:  Treatment:  Will proceed with diagnostic colonoscopy. Patient counseled on risks, benefits, and alternatives of treatment plan at length. Patient states an understanding and willingness to proceed with plan. Consent signed and prep instructions provided. Pt is on Coumadin. Pt is not willing to bridge with any injections. We will contact. Dr Genesis Cross office about recommendations for [facility] prior to the procedure. The patient was counseled at length about the risks of donna Covid-19 during their perioperative period and any recovery window from their procedure. The patient was made aware that donna Covid-19  may worsen their prognosis for recovering from their procedure  and lend to a higher morbidity and/or mortality risk. All material risks, benefits, and reasonable alternatives including postponing the procedure were discussed. The patient does wish to proceed with the procedure at this time. Orders Placed This Encounter   Procedures    Covid-19 Ambulatory       Orders Placed This Encounter   Medications    Na Sulfate-K Sulfate-Mg Sulf 17.5-3.13-1.6 GM/177ML SOLN     Sig: Take 177 mLs by mouth See Admin Instructions     Dispense:  1 kit     Refill:  0        Follow Up:  Return for Colonoscopy follow-up.         Cristiane Mendoza PA-C

## 2020-10-22 ENCOUNTER — TELEPHONE (OUTPATIENT)
Dept: BARIATRICS/WEIGHT MGMT | Age: 80
End: 2020-10-22

## 2020-10-22 NOTE — TELEPHONE ENCOUNTER
Patient called to cancel procedure stated we were taking to long to schedule. Advised patient we just received clearance today from cardiology for procedure and procedure was in process of being schedule right now. Patient stated he was going with someone else. Advised surgery scheduler to cancel procedure.

## 2020-10-28 ENCOUNTER — TELEPHONE (OUTPATIENT)
Dept: SURGERY | Age: 80
End: 2020-10-28

## 2020-10-28 NOTE — TELEPHONE ENCOUNTER
SPOKE TO Kd Cabral and Evette REGARDING SURGERY (Colonoscopy) SCHEDULED @ [facility]. NOTIFIED OF DATES, TIMES AND LOCATION    PHONE ASSESSMENT  SURGERY - 11/11/20 @ 1400, 1200 arrival  P/O - 11/19/20 at 1030    NPO AFTER MIDNIGHT  Covid-19 Culture on 11/4/20 @  at [facility]   Prep:  Clear liquids whole day of 11/10/20. Suprep:  1st dose: 11/10/20 at 1600.   2nd dose: 11/11/20 at [address] - Not on thinners

## 2020-11-02 ENCOUNTER — HOSPITAL ENCOUNTER (EMERGENCY)
Age: 80
Discharge: HOME | End: 2020-11-02
Attending: EMERGENCY MEDICINE
Payer: MEDICARE

## 2020-11-02 ENCOUNTER — TELEPHONE (OUTPATIENT)
Dept: PHARMACY | Age: 80
End: 2020-11-02

## 2020-11-02 VITALS
DIASTOLIC BLOOD PRESSURE: 87 MMHG | SYSTOLIC BLOOD PRESSURE: 145 MMHG | RESPIRATION RATE: 16 BRPM | WEIGHT: 235 LBS | HEART RATE: 94 BPM | TEMPERATURE: 97.5 F | BODY MASS INDEX: 32.9 KG/M2 | OXYGEN SATURATION: 100 % | HEIGHT: 71 IN

## 2020-11-02 LAB
INR BLD: 3.03 INDEX
PROTHROMBIN TIME: 35.3 SECONDS (ref 11.7–14.5)

## 2020-11-02 PROCEDURE — 85610 PROTHROMBIN TIME: CPT

## 2020-11-02 PROCEDURE — 6370000000 HC RX 637 (ALT 250 FOR IP): Performed by: EMERGENCY MEDICINE

## 2020-11-02 PROCEDURE — 99283 EMERGENCY DEPT VISIT LOW MDM: CPT

## 2020-11-02 RX ADMIN — Medication 1 EACH: at 16:52

## 2020-11-02 NOTE — ED NOTES
Pt discharged with instructions and pt stated understanding.   Pt walked out of the [facility] 5077, RN  11/02/20 [id number]

## 2020-11-02 NOTE — ED PROVIDER NOTES
Triage Chief Complaint:   Laceration (states (cut myself 4 hours ago shaving. I'm on warfarin and it wouldn't stop bleeding. Spoke to the warfarin clinic who suggested ice and pressure). )    [place]:  Juan Rey is a 78 y.o. male that presents to the ED with persistent bleeding out of his left chin he shaved this morning and it cannot stop. He is on Coumadin. He has not had his INR checked for many months because of the Covid 19 infection. He denies any other complaints no easy bruisability        Past Medical History:   Diagnosis Date    Atrial flutter ([address]     H/O Doppler ultrasound 10/26/15    Peripheral vascular.  H/O echocardiogram 12    Mild concentric LVH, mild RV and aortic root dilatation, mild tricuspid regurg. w/pulmonary HTN, EF 50-55%.  H/O myocardial perfusion scan 12    Lexiscan Thallium. Normal perfusion, EF 44%. Patient is in atrial flutter w/rapid rate.  Hypertension     Varicose veins 2015    Both lower extremities left>right     Venous insufficiency 2017     Past Surgical History:   Procedure Laterality Date    CARDIOVERSION  3/6/2012         CATARACT REMOVAL      right eye    CHOLECYSTECTOMY      CYST REMOVAL      HEMORRHOID SURGERY      HERNIA REPAIR      PROSTATE CRYOABLATION  2016    TOOTH EXTRACTION       No family history on file.   Social History     Socioeconomic History    Marital status:      Spouse name: Not on file    Number of children: Not on file    Years of education: Not on file    Highest education level: Not on file   Occupational History    Occupation: Retired   Social Needs    Financial resource strain: Not on file    Food insecurity     Worry: Not on file     Inability: Not on file   [employer] needs     Medical: Not on file     Non-medical: Not on file   Tobacco Use    Smoking status: Former Smoker     Years: 40.00     Last attempt to quit: 2000     Years since quittin.8    Smokeless tobacco: Never Used    Tobacco comment: smoked pipe   Substance and Sexual Activity    Alcohol use: No    Drug use: No    Sexual activity: Yes     Partners: Female     Comment: marriied   Lifestyle    Physical activity     Days per week: Not on file     Minutes per session: Not on file    Stress: Not on file   Relationships    Social connections     Talks on phone: Not on file     Gets together: Not on file     Attends Bahai service: Not on file     Active member of club or organization: Not on file     Attends meetings of clubs or organizations: Not on file     Relationship status: Not on file    Intimate partner violence     Fear of current or ex partner: Not on file     Emotionally abused: Not on file     Physically abused: Not on file     Forced sexual activity: Not on file   Other Topics Concern    Not on file   Social History Narrative    Not on file     No current facility-administered medications for this encounter.       Current Outpatient Medications   Medication Sig Dispense Refill    Na Sulfate-K Sulfate-Mg Sulf 17.5-3.13-1.6 GM/177ML SOLN Take 177 mLs by mouth See Admin Instructions 1 kit 0    tiZANidine (ZANAFLEX) 2 MG tablet Take 2 mg by mouth every 6 hours as needed      warfarin (COUMADIN) 2.5 MG tablet Take 1 tablet by mouth daily 90 tablet 1    warfarin (COUMADIN) 1 MG tablet Take 1 tablet by mouth daily On Mondays and Fridays 30 tablet 1    oxybutynin (DITROPAN-XL) 5 MG extended release tablet Take 5 mg by mouth every evening      cetirizine (ZYRTEC) 10 MG tablet Take 10 mg by mouth daily as needed for Allergies      acetaminophen (ARTHRITIS PAIN) 650 MG extended release tablet Take 650 mg by mouth every 8 hours as needed for Pain      olmesartan (BENICAR) 5 MG tablet Take 5 mg by mouth 2 times daily      hydrochlorothiazide (HYDRODIURIL) 25 MG tablet Take 25 mg by mouth daily      metoprolol (LOPRESSOR) 50 MG tablet Take 1 tablet by mouth 2 times daily 60 tablet 6 Allergies   Allergen Reactions    Latex Rash    Dicyclomine     Neosporin [Bacitracin-Neomycin-Polymyxin] Rash         ROS:    Review of Systems   Skin: Positive for wound. All other systems reviewed and are negative. Nursing Notes Reviewed    Physical Exam:  ED Triage Vitals   Enc Vitals Group      BP       Pulse       Resp       Temp       Temp src       SpO2       Weight       Height       Head Circumference       Peak Flow       Pain Score       Pain Loc       Pain Edu? Excl. in [address]? Physical Exam  Vitals signs and nursing note reviewed. Exam conducted with a chaperone present. Constitutional:       Appearance: He is well-developed. HENT:      Head: Normocephalic and atraumatic. Comments: Patient has a small abrasion that she is aware of her blood over his left chin. He has a clean shaven face and a few other areas of scabs. There is no petechia purpura on his face  Eyes:      Pupils: Pupils are equal, round, and reactive to light. Neck:      Musculoskeletal: Normal range of motion and neck supple. Musculoskeletal: Normal range of motion. Skin:     General: Skin is warm and dry. Neurological:      Mental Status: He is alert and oriented to person, place, and time. I have reviewed and interpreted all of the currently available lab results from this visit (ifapplicable):  Results for orders placed or performed during the hospital encounter of 11/02/20   Protime-INR   Result Value Ref Range    Protime 35.3 (H) 11.7 - 14.5 SECONDS    INR 3.03 INDEX      Radiographs (if obtained):  [] The following radiograph wasinterpreted by myself in the absence of a radiologist:   [] Radiologist's Report Reviewed:  No orders to display         EKG (if obtained): (All EKG's are interpreted by myself in the absence of a cardiologist)    Chart review shows recent radiographs:  No results found. MDM:      Patient has not had his INR checked for months.   Since he has no access for follow-up because of the coronavirus I will check a stat INR to make sure he is within normal range and not profoundly supratherapeutic. Gelfoam dressing will be applied to his face         Clinical Impression:  1. Warfarin-induced coagulopathy ([address]    2. Abrasion of face, initial encounter    3. Facial laceration, initial encounter      Disposition referral (if applicable):  Josue Brennan MD  [id number] [address] [place]   [phone]    Schedule an appointment as soon as possible for a visit in 1 day  If symptoms worsen    Disposition medications (if applicable):  Discharge Medication List as of 11/2/2020  5:09 PM              Yovani Martins DO, FACEP      Comment: Please note this report has been produced using speech recognition software and maycontain errors related to that system including errors in grammar, punctuation, and spelling, as well as words and phrases that may be inappropriate. If there are any questions or concerns please feel free to contact thedictating provider for clarification.         Anthony Mccarthy DO  11/03/20 435 Uri Ford DO  11/09/20 9301

## 2020-11-02 NOTE — TELEPHONE ENCOUNTER
Patient's wife called to report patient cut himself while shaving and bleeding hasn't stopped after an hour. Patient initially put tissue on area then tried gauze. Debra Correa inquires if they should go to Dr. Genesis Cross office. Advised to apply continuous pressure and try ice on area. Advised against going to Dr. Genesis Cross office without calling there first. Advised to call back with update in an hour. They report bleeding has slowed down but is still soaking a band aid. Additionally, patient now scheduled for colonoscopy 11/11. They have not been told how long to hold warfarin. Called Dr. Ritika Chiang office Patient to hold warfarin 5 days per Melanee Inch. Called patient and wife Debra Correa also on speakerphone. They went to ER and have had no bleeding since. INR was 3.03 at ER. Advised to hold warfarin for 5 days prior to colonoscopy 11/11. Patient to restart at direction of Dr. Sheyla Doty and advised to take warfarin 3.5mg on the day warfarin is restarted. Confirmed next INR 11/17. Patient scheduled for COVID-19 test tomorrow. Advised patient check with Dr. Ritika Chiang office in regards to appointment with Dr. Estefania Ovalles scheduled after COVID-19 test but before the procedure.      CLINICAL PHARMACY CONSULT: MED RECONCILIATION/REVIEW ADDENDUM    For Pharmacy Admin Tracking Only    PHSO: No  Total # of Interventions Recommended: 0  Total Interventions Accepted:   Time Spent (min): 30    Ninoska Ramirez PharmD

## 2020-11-02 NOTE — TELEPHONE ENCOUNTER
Received call from [facility] pharmacist at [address] [facility]. Ze Aguirre is on Coumadin and is asking how far to hold. HOLD BLOOD THINNERS - Hold Coumadin for 5 days, starting on 11/6/20   [facility] to inform Juwan's wife.

## 2020-11-04 ENCOUNTER — HOSPITAL ENCOUNTER (OUTPATIENT)
Age: 80
Discharge: HOME | End: 2020-11-04
Payer: MEDICARE

## 2020-11-04 PROCEDURE — U0002 COVID-19 LAB TEST NON-CDC: HCPCS

## 2020-11-06 LAB
SARS-COV-2: NOT DETECTED
SOURCE: NORMAL

## 2020-11-10 ENCOUNTER — ANESTHESIA EVENT (OUTPATIENT)
Dept: OPERATING ROOM | Age: 80
End: 2020-11-10
Payer: MEDICARE

## 2020-11-10 NOTE — ANESTHESIA PRE PROCEDURE
10 mg by mouth daily as needed for Allergies      acetaminophen (ARTHRITIS PAIN) 650 MG extended release tablet Take 650 mg by mouth every 8 hours as needed for Pain      olmesartan (BENICAR) 5 MG tablet Take 5 mg by mouth 2 times daily      hydrochlorothiazide (HYDRODIURIL) 25 MG tablet Take 25 mg by mouth daily      metoprolol (LOPRESSOR) 50 MG tablet Take 1 tablet by mouth 2 times daily 60 tablet 6    Na Sulfate-K Sulfate-Mg Sulf 17.5-3.13-1.6 GM/177ML SOLN Take 177 mLs by mouth See Admin Instructions 1 kit 0    warfarin (COUMADIN) 2.5 MG tablet Take 1 tablet by mouth daily 90 tablet 1    warfarin (COUMADIN) 1 MG tablet Take 1 tablet by mouth daily On Mondays and Fridays 30 tablet 1       Allergies: Allergies   Allergen Reactions    Latex Rash    Dicyclomine     Neosporin [Bacitracin-Neomycin-Polymyxin] Rash       Problem List:    Patient Active Problem List   Diagnosis Code    AF (atrial fibrillation) ([facility]) I48.91    Cataract extraction status of right eye Z98.41    Cataract, left eye H26.9    H/O epistaxis Z87.898    HTN (hypertension) I10    Varicose veins I83.90    Venous insufficiency I87.2       Past Medical History:        Diagnosis Date    Atrial flutter ([address]     H/O Doppler ultrasound 10/26/15    Peripheral vascular.  H/O echocardiogram 2/7/12    Mild concentric LVH, mild RV and aortic root dilatation, mild tricuspid regurg. w/pulmonary HTN, EF 50-55%.  H/O myocardial perfusion scan 2/7/12    Lexiscan Thallium. Normal perfusion, EF 44%. Patient is in atrial flutter w/rapid rate.     Hypertension     Varicose veins 11/17/2015    Both lower extremities left>right     Venous insufficiency 11/14/2017       Past Surgical History:        Procedure Laterality Date    CARDIOVERSION  3/6/2012         CATARACT REMOVAL      right eye    CHOLECYSTECTOMY      CYST REMOVAL      HEMORRHOID SURGERY      HERNIA REPAIR      PROSTATE CRYOABLATION  09/22/2016    TOOTH EXTRACTION Social History:    Social History     Tobacco Use    Smoking status: Former Smoker     Years: 40.00     Last attempt to quit: 2000     Years since quittin.8    Smokeless tobacco: Never Used    Tobacco comment: smoked pipe   Substance Use Topics    Alcohol use: No                                Counseling given: Not Answered  Comment: smoked pipe      Vital Signs (Current):   Vitals:    11/10/20 1515   Weight: 220 lb (99.8 kg)   Height: 5' 11\" (1.803 m)                                              BP Readings from Last 3 Encounters:   20 (!) 145/87   10/08/20 128/74   19 134/72       NPO Status:                                                                                 BMI:   Wt Readings from Last 3 Encounters:   20 235 lb (106.6 kg)   10/08/20 230 lb (104.3 kg)   19 230 lb (104.3 kg)     Body mass index is 30.68 kg/m². CBC:   Lab Results   Component Value Date    WBC 5.4 2020    RBC 4.81 2020    HGB 15.7 2020    HCT 48.9 2020    .7 2020    RDW 12.5 2020     2020       CMP:   Lab Results   Component Value Date     2020    K 4.0 2020     2020    CO2 31 2020    BUN 27 2020    CREATININE 1.3 2020    GFRAA >60 2020    LABGLOM 53 2020    GLUCOSE 87 2017    PROT 7.3 2020    CALCIUM 9.8 2020    BILITOT 0.7 2020    ALKPHOS 82 2020    AST 21 2020    ALT 12 2020       POC Tests: No results for input(s): POCGLU, POCNA, POCK, POCCL, POCBUN, POCHEMO, POCHCT in the last 72 hours.     Coags:   Lab Results   Component Value Date    PROTIME 35.3 2020    PROTIME 31.8 2020    INR 3.03 2020       HCG (If Applicable): No results found for: PREGTESTUR, PREGSERUM, HCG, HCGQUANT     ABGs: No results found for: PHART, PO2ART, ZHE6SGO, WQL7XOX, BEART, O4HRUSWS     Type & Screen (If Applicable):  No results found for: LABABO, [address]    Drug/Infectious Status (If Applicable):  No results found for: HIV, HEPCAB    COVID-19 Screening (If Applicable):   Lab Results   Component Value Date    COVID19 NOT DETECTED 2020         Anesthesia Evaluation  Patient summary reviewed  Airway:         Dental:          Pulmonary:                             ROS comment: Former Smoker   Quit Smokin00        Cardiovascular:    (+) hypertension:, dysrhythmias: atrial fibrillation,          Beta Blocker:  Order written      ROS comment: echocardiogram  Mild concentric LVH, mild RV and aortic root dilatation, mild tricuspid regurg. w/pulmonary HTN, EF 50-55%. myocardial perfusion scan  Lexiscan Thallium. Normal perfusion, EF 44%. Patient is in atrial flutter w/rapid rate. Neuro/Psych:   Negative Neuro/Psych ROS              GI/Hepatic/Renal:   (+) bowel prep, morbid obesity          Endo/Other: Negative Endo/Other ROS                    Abdominal:           Vascular: negative vascular ROS. Anesthesia Plan      MAC     ASA 3       Induction: intravenous. Erika Suarez APRN - CRNA   11/10/2020       Pre Anesthesia Assessment complete.  Chart reviewed on 11/10/2020

## 2020-11-11 ENCOUNTER — HOSPITAL ENCOUNTER (OUTPATIENT)
Age: 80
Setting detail: OUTPATIENT SURGERY
Discharge: HOME | End: 2020-11-11
Attending: SURGERY | Admitting: SURGERY
Payer: MEDICARE

## 2020-11-11 ENCOUNTER — ANESTHESIA (OUTPATIENT)
Dept: OPERATING ROOM | Age: 80
End: 2020-11-11
Payer: MEDICARE

## 2020-11-11 VITALS
SYSTOLIC BLOOD PRESSURE: 148 MMHG | WEIGHT: 220 LBS | DIASTOLIC BLOOD PRESSURE: 103 MMHG | HEART RATE: 87 BPM | TEMPERATURE: 97.5 F | BODY MASS INDEX: 30.8 KG/M2 | OXYGEN SATURATION: 97 % | RESPIRATION RATE: 16 BRPM | HEIGHT: 71 IN

## 2020-11-11 VITALS
SYSTOLIC BLOOD PRESSURE: 122 MMHG | DIASTOLIC BLOOD PRESSURE: 67 MMHG | OXYGEN SATURATION: 97 % | RESPIRATION RATE: 8 BRPM

## 2020-11-11 PROCEDURE — 3700000000 HC ANESTHESIA ATTENDED CARE: Performed by: SURGERY

## 2020-11-11 PROCEDURE — 3609010200 HC COLONOSCOPY ABLATION TUMOR POLYP/OTHER LES: Performed by: SURGERY

## 2020-11-11 PROCEDURE — 6360000002 HC RX W HCPCS: Performed by: NURSE ANESTHETIST, CERTIFIED REGISTERED

## 2020-11-11 PROCEDURE — 2500000003 HC RX 250 WO HCPCS: Performed by: NURSE ANESTHETIST, CERTIFIED REGISTERED

## 2020-11-11 PROCEDURE — 2709999900 HC NON-CHARGEABLE SUPPLY: Performed by: SURGERY

## 2020-11-11 PROCEDURE — 7100000011 HC PHASE II RECOVERY - ADDTL 15 MIN: Performed by: SURGERY

## 2020-11-11 PROCEDURE — 2580000003 HC RX 258: Performed by: SURGERY

## 2020-11-11 PROCEDURE — 7100000010 HC PHASE II RECOVERY - FIRST 15 MIN: Performed by: SURGERY

## 2020-11-11 PROCEDURE — 3700000001 HC ADD 15 MINUTES (ANESTHESIA): Performed by: SURGERY

## 2020-11-11 PROCEDURE — 88305 TISSUE EXAM BY PATHOLOGIST: CPT | Performed by: PATHOLOGY

## 2020-11-11 PROCEDURE — 45385 COLONOSCOPY W/LESION REMOVAL: CPT | Performed by: SURGERY

## 2020-11-11 RX ORDER — PROPOFOL 10 MG/ML
INJECTION, EMULSION INTRAVENOUS PRN
Status: DISCONTINUED | OUTPATIENT
Start: 2020-11-11 | End: 2020-11-11

## 2020-11-11 RX ORDER — PROPOFOL 10 MG/ML
INJECTION, EMULSION INTRAVENOUS PRN
Status: DISCONTINUED | OUTPATIENT
Start: 2020-11-11 | End: 2020-11-11 | Stop reason: SDUPTHER

## 2020-11-11 RX ORDER — SODIUM CHLORIDE, SODIUM LACTATE, POTASSIUM CHLORIDE, CALCIUM CHLORIDE 600; 310; 30; 20 MG/100ML; MG/100ML; MG/100ML; MG/100ML
INJECTION, SOLUTION INTRAVENOUS CONTINUOUS
Status: DISCONTINUED | OUTPATIENT
Start: 2020-11-11 | End: 2020-11-11 | Stop reason: HOSPADM

## 2020-11-11 RX ORDER — LIDOCAINE HYDROCHLORIDE 20 MG/ML
INJECTION, SOLUTION EPIDURAL; INFILTRATION; INTRACAUDAL; PERINEURAL PRN
Status: DISCONTINUED | OUTPATIENT
Start: 2020-11-11 | End: 2020-11-11 | Stop reason: SDUPTHER

## 2020-11-11 RX ORDER — SODIUM CHLORIDE 0.9 % (FLUSH) 0.9 %
10 SYRINGE (ML) INJECTION ONCE
Status: COMPLETED | OUTPATIENT
Start: 2020-11-11 | End: 2020-11-11

## 2020-11-11 RX ADMIN — SODIUM CHLORIDE, POTASSIUM CHLORIDE, SODIUM LACTATE AND CALCIUM CHLORIDE: 600; 310; 30; 20 INJECTION, SOLUTION INTRAVENOUS at 13:31

## 2020-11-11 RX ADMIN — PROPOFOL 365 MG: 10 INJECTION, EMULSION INTRAVENOUS at 15:48

## 2020-11-11 RX ADMIN — LIDOCAINE HYDROCHLORIDE 100 MG: 20 INJECTION, SOLUTION EPIDURAL; INFILTRATION; INTRACAUDAL; PERINEURAL at 15:48

## 2020-11-11 RX ADMIN — Medication 10 ML: at 13:31

## 2020-11-11 ASSESSMENT — PAIN - FUNCTIONAL ASSESSMENT: PAIN_FUNCTIONAL_ASSESSMENT: 0-10

## 2020-11-11 ASSESSMENT — PAIN SCALES - GENERAL
PAINLEVEL_OUTOF10: 0

## 2020-11-11 NOTE — ANESTHESIA POSTPROCEDURE EVALUATION
Department of Anesthesiology  Postprocedure Note    Patient: Pierre Lauren  MRN: [id number]  YOB: 1940  Date of evaluation: 11/11/2020  Time:  4:30 PM     Procedure Summary     Date:  11/11/20 Room / Location:  [address] [place] [address] 01 / [facility]    Anesthesia Start:  1548 Anesthesia Stop:  1629    Procedure:  COLONOSCOPY POLYPECTOMY ABLATION (N/A ) Diagnosis:  (OCCULT BLOOD POSITIVE)    Surgeon:  Fercho Rader MD Responsible Provider:  VANESA Atkinson CRNA    Anesthesia Type:  MAC ASA Status:  3          Anesthesia Type: MAC    Balaji Phase I: Balaji Score: 10    Balaji Phase II:      Last vitals: Reviewed and per EMR flowsheets.        Anesthesia Post Evaluation    Patient location during evaluation: PACU  Patient participation: complete - patient participated  Level of consciousness: awake and alert  Pain score: 0  Airway patency: patent  Nausea & Vomiting: no nausea and no vomiting  Complications: no  Cardiovascular status: blood pressure returned to baseline  Respiratory status: acceptable  Hydration status: euvolemic

## 2020-11-11 NOTE — H&P
Chief Complaint   Patient presents with    Surgical Consult       need for c--scope (positive hemoccult) last c-scope when Andom was with [facility].         SUBJECTIVE:  HPI: Pt presents today for evaluation and possible need of colonoscopy. Pt has had colonoscopy in the past, but pt is unsure of when it was - though is able to tell me that Dr. Brandon Nath performed the procedure. Patient has been experiencing intermittent blood in his stools (about once per 4-6 weeks) - is unsure if he has hemorrhoids, over the past year, has had inconsistent BMs that vascillate between normal, diarrhea and constipation. Denies abd pain, N/V, distention, or unintended weight loss. There is no known family history of colon cancer. His mother did have abdominal cancer, but pt is unknown if it was specific to the colon.      I have reviewed the patient's (pertinent information to this visit) medical history, family history(scanned in  the Media tab under \"patient questioner\"), social history and review of systems with the patient today in the office.          Past Surgical History         Past Surgical History:   Procedure Laterality Date    CARDIOVERSION   3/6/2012          CATARACT REMOVAL         right eye    CHOLECYSTECTOMY        CYST REMOVAL        HEMORRHOID SURGERY        HERNIA REPAIR        PROSTATE CRYOABLATION   09/22/2016    TOOTH EXTRACTION            Past Medical History        Past Medical History:   Diagnosis Date    Atrial flutter ([address]      H/O Doppler ultrasound 10/26/15     Peripheral vascular.  H/O echocardiogram 2/7/12     Mild concentric LVH, mild RV and aortic root dilatation, mild tricuspid regurg. w/pulmonary HTN, EF 50-55%.  H/O myocardial perfusion scan 2/7/12     Lexiscan Thallium. Normal perfusion, EF 44%. Patient is in atrial flutter w/rapid rate.     Hypertension      Varicose veins 11/17/2015     Both lower extremities left>right     Venous insufficiency 11/14/2017        Family History No family history on file.      Social History               Socioeconomic History    Marital status:        Spouse name: Not on file    Number of children: Not on file    Years of education: Not on file    Highest education level: Not on file   Occupational History    Occupation: Retired   Social Needs    Financial resource strain: Not on file    Food insecurity       Worry: Not on file       Inability: Not on file   [employer] needs       Medical: Not on file       Non-medical: Not on file   Tobacco Use    Smoking status: Former Smoker       Years: 40.00       Last attempt to quit: 2000       Years since quittin.7    Smokeless tobacco: Never Used    Tobacco comment: smoked pipe   Substance and Sexual Activity    Alcohol use: No    Drug use: No    Sexual activity: Yes       Partners: Female       Comment: marriied   Lifestyle    Physical activity       Days per week: Not on file       Minutes per session: Not on file    Stress: Not on file   Relationships    Social connections       Talks on phone: Not on file       Gets together: Not on file       Attends [employer] service: Not on file       Active member of club or organization: Not on file       Attends meetings of clubs or organizations: Not on file       Relationship status: Not on file    Intimate partner violence       Fear of current or ex partner: Not on file       Emotionally abused: Not on file       Physically abused: Not on file       Forced sexual activity: Not on file   Other Topics Concern    Not on file   Social History Narrative    Not on file           Current Facility-Administered Medications          Current Outpatient Medications   Medication Sig Dispense Refill    Na Sulfate-K Sulfate-Mg Sulf 17.5-3.13-1.6 GM/177ML SOLN Take 177 mLs by mouth See Admin Instructions 1 kit 0    tiZANidine (ZANAFLEX) 2 MG tablet Take 2 mg by mouth every 6 hours as needed        warfarin (COUMADIN) 2.5 MG tablet Take 1 tablet by mouth daily 90 tablet 1    warfarin (COUMADIN) 1 MG tablet Take 1 tablet by mouth daily On Mondays and Fridays 30 tablet 1    oxybutynin (DITROPAN-XL) 5 MG extended release tablet Take 5 mg by mouth every evening        cetirizine (ZYRTEC) 10 MG tablet Take 10 mg by mouth daily as needed for Allergies        acetaminophen (ARTHRITIS PAIN) 650 MG extended release tablet Take 650 mg by mouth every 8 hours as needed for Pain        olmesartan (BENICAR) 5 MG tablet Take 5 mg by mouth 2 times daily        hydrochlorothiazide (HYDRODIURIL) 25 MG tablet Take 25 mg by mouth daily        metoprolol (LOPRESSOR) 50 MG tablet Take 1 tablet by mouth 2 times daily 60 tablet 6      No current facility-administered medications for this visit. Allergies   Allergen Reactions    Latex Rash    Dicyclomine      Neosporin [Bacitracin-Neomycin-Polymyxin] Rash         Review of Systems:          Review of Systems   Constitutional: Negative for chills and fever. HENT: Negative for ear pain, mouth sores, sore throat and tinnitus. Eyes: Negative for photophobia, redness and itching. Respiratory: Negative for apnea, choking and stridor. Cardiovascular: Negative for chest pain and palpitations. Gastrointestinal: Positive for blood in stool, constipation (intermittent) and diarrhea (intermittent). Negative for abdominal distention, abdominal pain, anal bleeding, nausea, rectal pain and vomiting. Endocrine: Negative for polydipsia. Genitourinary: Negative for enuresis, flank pain and hematuria. Musculoskeletal: Negative for back pain, joint swelling and myalgias. Skin: Negative for color change and pallor. Allergic/Immunologic: Negative for environmental allergies. Neurological: Negative for syncope and speech difficulty.    Psychiatric/Behavioral: Negative for confusion and hallucinations.            OBJECTIVE:  Physical Exam:    /74   Pulse 80   Temp 97.9 °F (36.6 °C) (Infrared)   Ht 5' 11\" (1.803 m)   Wt 230 lb (104.3 kg)   BMI 32.08 kg/m²       Physical Exam  Constitutional:       Appearance: He is well-developed. HENT:      Head: Normocephalic. Eyes:      Pupils: Pupils are equal, round, and reactive to light. Neck:      Musculoskeletal: Normal range of motion and neck supple. Cardiovascular:      Rate and Rhythm: Normal rate. Pulmonary:      Effort: Pulmonary effort is normal.   Abdominal:      General: There is no distension. Palpations: Abdomen is soft. There is no mass. Tenderness: There is no abdominal tenderness. There is no guarding or rebound. Musculoskeletal: Normal range of motion. Skin:     General: Skin is warm. Neurological:      Mental Status: He is alert and oriented to person, place, and time.          ASSESSMENT:  1. Occult blood positive stool    2. Pre-op testing             PLAN:  Treatment:  Will proceed with diagnostic colonoscopy. Patient counseled on risks, benefits, and alternatives of treatment plan at length. Patient states an understanding and willingness to proceed with plan.  Consent signed and prep instructions provided.   Christie Smallwood MD

## 2020-11-11 NOTE — ANESTHESIA PRE PROCEDURE
Department of Anesthesiology  Preprocedure Note       Name:  Selvin Estrada   Age:  78 y.o.  :  1940                                          MRN:  [id number]         Date:  2020      Surgeon: Marleny Velez):  Osvaldo Stewart MD    Procedure: Procedure(s):  COLONOSCOPY DIAGNOSTIC    Medications prior to admission:   Prior to Admission medications    Medication Sig Start Date End Date Taking? Authorizing Provider   tiZANidine (ZANAFLEX) 2 MG tablet Take 2 mg by mouth every 6 hours as needed   Yes Historical Provider, MD   oxybutynin (DITROPAN-XL) 5 MG extended release tablet Take 5 mg by mouth every evening   Yes Historical Provider, MD   cetirizine (ZYRTEC) 10 MG tablet Take 10 mg by mouth daily as needed for Allergies   Yes Historical Provider, MD   acetaminophen (ARTHRITIS PAIN) 650 MG extended release tablet Take 650 mg by mouth every 8 hours as needed for Pain   Yes Historical Provider, MD   olmesartan (BENICAR) 5 MG tablet Take 5 mg by mouth 2 times daily   Yes Historical Provider, MD   hydrochlorothiazide (HYDRODIURIL) 25 MG tablet Take 25 mg by mouth daily   Yes Historical Provider, MD   metoprolol (LOPRESSOR) 50 MG tablet Take 1 tablet by mouth 2 times daily 7/21/15  Yes Surya Rushing MD   Na Sulfate-K Sulfate-Mg Sulf 17.5-3.13-1.6 GM/177ML SOLN Take 177 mLs by mouth See Admin Instructions 10/8/20   Darvin Mustafa PA-C   warfarin (COUMADIN) 2.5 MG tablet Take 1 tablet by mouth daily 20   Surya Rushing MD   warfarin (COUMADIN) 1 MG tablet Take 1 tablet by mouth daily On  and 20   Surya Rushing MD       Current medications:    Current Facility-Administered Medications   Medication Dose Route Frequency Provider Last Rate Last Dose    lactated ringers infusion   Intravenous Continuous Osvaldo Stewart  mL/hr at 20 1331         Allergies:     Allergies   Allergen Reactions    Latex Rash    Dicyclomine     Neosporin [Bacitracin-Neomycin-Polymyxin] Rash Time of last solid consumption: 0900                        Date of last liquid consumption: 11/10/20                        Date of last solid food consumption: 11/10/20    BMI:   Wt Readings from Last 3 Encounters:   20 220 lb (99.8 kg)   20 235 lb (106.6 kg)   10/08/20 230 lb (104.3 kg)     Body mass index is 30.68 kg/m². CBC:   Lab Results   Component Value Date    WBC 5.4 2020    RBC 4.81 2020    HGB 15.7 2020    HCT 48.9 2020    .7 2020    RDW 12.5 2020     2020       CMP:   Lab Results   Component Value Date     2020    K 4.0 2020     2020    CO2 31 2020    BUN 27 2020    CREATININE 1.3 2020    GFRAA >60 2020    LABGLOM 53 2020    GLUCOSE 87 2017    PROT 7.3 2020    CALCIUM 9.8 2020    BILITOT 0.7 2020    ALKPHOS 82 2020    AST 21 2020    ALT 12 2020       POC Tests: No results for input(s): POCGLU, POCNA, POCK, POCCL, POCBUN, POCHEMO, POCHCT in the last 72 hours.     Coags:   Lab Results   Component Value Date    PROTIME 35.3 2020    PROTIME 31.8 2020    INR 3.03 2020       HCG (If Applicable): No results found for: PREGTESTUR, PREGSERUM, HCG, HCGQUANT     ABGs: No results found for: PHART, PO2ART, KNG3GUJ, WZF6UDY, BEART, M3CEFRVC     Type & Screen (If Applicable):  No results found for: LABABO, LABRH    Drug/Infectious Status (If Applicable):  No results found for: HIV, HEPCAB    COVID-19 Screening (If Applicable):   Lab Results   Component Value Date    COVID19 NOT DETECTED 2020         Anesthesia Evaluation  Patient summary reviewed  Airway: Mallampati: II        Dental:    (+) upper dentures      Pulmonary:normal exam                              ROS comment: Former Smoker   Quit Smokin00        Cardiovascular:    (+) hypertension:, dysrhythmias: atrial fibrillation,          Beta Blocker: Order written      ROS comment: echocardiogram  Mild concentric LVH, mild RV and aortic root dilatation, mild tricuspid regurg. w/pulmonary HTN, EF 50-55%. myocardial perfusion scan  Lexiscan Thallium. Normal perfusion, EF 44%. Patient is in atrial flutter w/rapid rate. Neuro/Psych:   Negative Neuro/Psych ROS              GI/Hepatic/Renal:   (+) bowel prep, morbid obesity          Endo/Other: Negative Endo/Other ROS                    Abdominal:           Vascular: negative vascular ROS. Anesthesia Plan      MAC     ASA 3       Induction: intravenous. VANESA Bobo - CRNA   11/11/2020       Pre Anesthesia Assessment complete.  Chart reviewed on 11/11/2020

## 2020-11-17 ENCOUNTER — TELEPHONE (OUTPATIENT)
Dept: PHARMACY | Age: 80
End: 2020-11-17

## 2020-11-17 ENCOUNTER — ANTI-COAG VISIT (OUTPATIENT)
Dept: PHARMACY | Age: 80
End: 2020-11-17
Payer: MEDICARE

## 2020-11-17 LAB
INR BLD: 1.79 INDEX
PROTHROMBIN TIME: 20.6 SECONDS (ref 11.7–14.5)

## 2020-11-17 PROCEDURE — 85610 PROTHROMBIN TIME: CPT

## 2020-11-17 PROCEDURE — 99212 OFFICE O/P EST SF 10 MIN: CPT

## 2020-11-17 PROCEDURE — 36415 COLL VENOUS BLD VENIPUNCTURE: CPT

## 2020-11-17 NOTE — TELEPHONE ENCOUNTER
Recent Travel Screening and Travel History documentation:     Travel Screening     Question   Response    In the last month, have you been in contact with someone who was confirmed or suspected to have Coronavirus / COVID-19? Have you had a COVID-19 viral test in the last 14 days? Yes - Negative result    Do you have any of the following new or worsening symptoms? None of these    Have you traveled internationally in the last month?   No      Travel History   Travel since 10/17/20     No documented travel since 10/17/20         CLINICAL PHARMACY CONSULT: KIMANI Rosario Tracking Only    PHSO: No  Total # of Interventions Recommended: 0  Total Interventions Accepted:   Time Spent (min): 5    Amelia Tirado, PharmD

## 2020-11-17 NOTE — PROGRESS NOTES
Medication Management Service  [address] [phone]  Audelia Simms [phone]    Visit Date: 11/17/2020   Subjective: All appointments have been changed to telephone visits at this time due to COVID-19. Devni Barba is a 78 y.o. male who is having INR checked by Audelia Simms  Duration of therapy: indefinite. Patient reports the following:   Adherent with regimen  Missed or extra doses:  None   Bleeding or thromboembolic side effects:  None  Significant medication changes:  None  Significant dietary changes: None  Significant alcohol or tobacco changes: None  Significant recent illness, disease state changes, or hospitalization:  None  Upcoming surgeries or procedures:  None  Falls: None           Assessment and Plan     PT/INR performed by Lab. INR today is 1.79, subtherapeutic, due to held doses for colonoscopy        Plan:  Take warfarin 3.5mg today then will continue current regimen of warfarin 2.5mg daily except 3.5mg on Mondays and Fridays. Recheck INR in 12 week(s). INR very stable prior to colonoscopy. Patient has standing lab orders for PT/INR. Patient verbalized understanding of dosing directions and information discussed. Progress note sent to referring office. Patient acknowledges working in consult agreement with pharmacist as referred by his/her physician. Patient accepted that for purposes of billing, this is a virtual visit with your provider for which we will submit a claim for reimbursement with your insurance company. You may be responsible for any copays, coinsurance amounts or other amounts not covered by your insurance company.      Electronically signed by KAROLINE Gracia [facility] on 11/17/20 at 2:06 PM EST    CLINICAL PHARMACY CONSULT: MED RECONCILIATION/REVIEW ADDENDUM    For Pharmacy Admin Tracking Only    PHSO: No  Total # of Interventions Recommended: 1  - Increased Dose #: 1  - Maintenance Safety Lab Monitoring #: 1  Total Interventions Accepted: 1  Time Spent (min): Je De Los SantosD

## 2020-11-19 ENCOUNTER — OFFICE VISIT (OUTPATIENT)
Dept: SURGERY | Age: 80
End: 2020-11-19
Payer: MEDICARE

## 2020-11-19 VITALS
BODY MASS INDEX: 32.32 KG/M2 | OXYGEN SATURATION: 98 % | HEIGHT: 71 IN | DIASTOLIC BLOOD PRESSURE: 78 MMHG | TEMPERATURE: 98 F | SYSTOLIC BLOOD PRESSURE: 130 MMHG | WEIGHT: 230.9 LBS | HEART RATE: 65 BPM

## 2020-11-19 PROCEDURE — 4040F PNEUMOC VAC/ADMIN/RCVD: CPT | Performed by: SURGERY

## 2020-11-19 PROCEDURE — G8427 DOCREV CUR MEDS BY ELIG CLIN: HCPCS | Performed by: SURGERY

## 2020-11-19 PROCEDURE — G8417 CALC BMI ABV UP PARAM F/U: HCPCS | Performed by: SURGERY

## 2020-11-19 PROCEDURE — 1036F TOBACCO NON-USER: CPT | Performed by: SURGERY

## 2020-11-19 PROCEDURE — 99213 OFFICE O/P EST LOW 20 MIN: CPT | Performed by: SURGERY

## 2020-11-19 PROCEDURE — G8484 FLU IMMUNIZE NO ADMIN: HCPCS | Performed by: SURGERY

## 2020-11-19 PROCEDURE — 1123F ACP DISCUSS/DSCN MKR DOCD: CPT | Performed by: SURGERY

## 2020-11-19 ASSESSMENT — ENCOUNTER SYMPTOMS
CONSTIPATION: 0
APNEA: 0
STRIDOR: 0
BACK PAIN: 0
SORE THROAT: 0
EYE REDNESS: 0
EYE ITCHING: 0
ANAL BLEEDING: 0
RECTAL PAIN: 0
PHOTOPHOBIA: 0
CHOKING: 0
COLOR CHANGE: 0

## 2020-11-19 NOTE — PROGRESS NOTES
Chief Complaint   Patient presents with    Follow-up     Post Cscope 20       SUBJECTIVE:  HPI: Patient presents for follow up after colonoscopy. Patient is feeling well, denies severe abdominal pain, bloating, rectal bleeding after the procedure. Path reveals:   Final Pathologic Diagnosis:   Colon, polyp in ascending:   -     Tubular adenoma.     -     Fragments of luminal contents.            Past Surgical History:   Procedure Laterality Date    CARDIOVERSION  3/6/2012         CATARACT REMOVAL      right eye    CHOLECYSTECTOMY      COLONOSCOPY N/A 2020    COLONOSCOPY POLYPECTOMY ABLATION performed by Gato Bauer MD at [address]      PROSTATE CRYOABLATION  2016    TOOTH EXTRACTION       Past Medical History:   Diagnosis Date    Atrial flutter ([address]     H/O Doppler ultrasound 10/26/15    Peripheral vascular.  H/O echocardiogram 12    Mild concentric LVH, mild RV and aortic root dilatation, mild tricuspid regurg. w/pulmonary HTN, EF 50-55%.  H/O myocardial perfusion scan 12    Lexiscan Thallium. Normal perfusion, EF 44%. Patient is in atrial flutter w/rapid rate.  Hypertension     Varicose veins 2015    Both lower extremities left>right     Venous insufficiency 2017     No family history on file.   Social History     Socioeconomic History    Marital status:      Spouse name: Not on file    Number of children: Not on file    Years of education: Not on file    Highest education level: Not on file   Occupational History    Occupation: Retired   Social Needs    Financial resource strain: Not on file    Food insecurity     Worry: Not on file     Inability: Not on file   [employer] needs     Medical: Not on file     Non-medical: Not on file   Tobacco Use    Smoking status: Former Smoker     Years: 40.00     Last attempt to quit: 2000     Years since quittin.8    Smokeless tobacco: Never Used    Tobacco comment: smoked pipe   Substance and Sexual Activity    Alcohol use: No    Drug use: No    Sexual activity: Yes     Partners: Female     Comment: marriied   Lifestyle    Physical activity     Days per week: Not on file     Minutes per session: Not on file    Stress: Not on file   Relationships    Social connections     Talks on phone: Not on file     Gets together: Not on file     Attends [employer]ars[employer] service: Not on file     Active member of club or organization: Not on file     Attends meetings of clubs or organizations: Not on file     Relationship status: Not on file    Intimate partner violence     Fear of current or ex partner: Not on file     Emotionally abused: Not on file     Physically abused: Not on file     Forced sexual activity: Not on file   Other Topics Concern    Not on file   Social History Narrative    Not on file       Current Outpatient Medications   Medication Sig Dispense Refill    Na Sulfate-K Sulfate-Mg Sulf 17.5-3.13-1.6 GM/177ML SOLN Take 177 mLs by mouth See Admin Instructions 1 kit 0    tiZANidine (ZANAFLEX) 2 MG tablet Take 2 mg by mouth every 6 hours as needed      warfarin (COUMADIN) 2.5 MG tablet Take 1 tablet by mouth daily 90 tablet 1    warfarin (COUMADIN) 1 MG tablet Take 1 tablet by mouth daily On Mondays and Fridays 30 tablet 1    oxybutynin (DITROPAN-XL) 5 MG extended release tablet Take 5 mg by mouth every evening      cetirizine (ZYRTEC) 10 MG tablet Take 10 mg by mouth daily as needed for Allergies      acetaminophen (ARTHRITIS PAIN) 650 MG extended release tablet Take 650 mg by mouth every 8 hours as needed for Pain      olmesartan (BENICAR) 5 MG tablet Take 5 mg by mouth 2 times daily      hydrochlorothiazide (HYDRODIURIL) 25 MG tablet Take 25 mg by mouth daily      metoprolol (LOPRESSOR) 50 MG tablet Take 1 tablet by mouth 2 times daily 60 tablet 6     No current facility-administered medications for this visit. on risks, benefits, andalternatives of treatment plan at length. Patient states an understanding and willingness to proceed with plan. No orders of the defined types were placed in this encounter. No orders of the defined types were placed in this encounter. Follow Up:  No follow-ups on file.       Pedro Aldrich MD

## 2020-12-07 ENCOUNTER — HOSPITAL ENCOUNTER (OUTPATIENT)
Age: 80
Discharge: HOME | End: 2020-12-07
Payer: MEDICARE

## 2020-12-07 LAB
ALBUMIN SERPL-MCNC: 4.6 GM/DL (ref 3.4–5)
ALP BLD-CCNC: 84 IU/L (ref 40–129)
ALT SERPL-CCNC: 17 U/L (ref 10–40)
ANION GAP SERPL CALCULATED.3IONS-SCNC: 10 MMOL/L (ref 4–16)
AST SERPL-CCNC: 23 IU/L (ref 15–37)
BILIRUB SERPL-MCNC: 0.8 MG/DL (ref 0–1)
BUN BLDV-MCNC: 22 MG/DL (ref 6–23)
CALCIUM SERPL-MCNC: 9.6 MG/DL (ref 8.3–10.6)
CHLORIDE BLD-SCNC: 98 MMOL/L (ref 99–110)
CO2: 28 MMOL/L (ref 21–32)
CREAT SERPL-MCNC: 1.4 MG/DL (ref 0.9–1.3)
ESTIMATED AVERAGE GLUCOSE: 131 MG/DL
GFR AFRICAN AMERICAN: 59 ML/MIN/1.73M2
GFR NON-AFRICAN AMERICAN: 49 ML/MIN/1.73M2
GLUCOSE FASTING: 92 MG/DL (ref 70–99)
HBA1C MFR BLD: 6.2 % (ref 4.2–6.3)
POTASSIUM SERPL-SCNC: 4.3 MMOL/L (ref 3.5–5.1)
SODIUM BLD-SCNC: 136 MMOL/L (ref 135–145)
TOTAL PROTEIN: 7.3 GM/DL (ref 6.4–8.2)

## 2020-12-07 PROCEDURE — 36415 COLL VENOUS BLD VENIPUNCTURE: CPT

## 2020-12-07 PROCEDURE — 83036 HEMOGLOBIN GLYCOSYLATED A1C: CPT

## 2020-12-07 PROCEDURE — 80053 COMPREHEN METABOLIC PANEL: CPT

## 2020-12-21 ENCOUNTER — OFFICE VISIT (OUTPATIENT)
Dept: CARDIOLOGY CLINIC | Age: 80
End: 2020-12-21
Payer: MEDICARE

## 2020-12-21 VITALS
HEIGHT: 71 IN | DIASTOLIC BLOOD PRESSURE: 60 MMHG | HEART RATE: 75 BPM | SYSTOLIC BLOOD PRESSURE: 122 MMHG | BODY MASS INDEX: 32.2 KG/M2 | WEIGHT: 230 LBS | TEMPERATURE: 96.3 F

## 2020-12-21 PROCEDURE — 99213 OFFICE O/P EST LOW 20 MIN: CPT | Performed by: INTERNAL MEDICINE

## 2020-12-21 PROCEDURE — 4040F PNEUMOC VAC/ADMIN/RCVD: CPT | Performed by: INTERNAL MEDICINE

## 2020-12-21 PROCEDURE — 1123F ACP DISCUSS/DSCN MKR DOCD: CPT | Performed by: INTERNAL MEDICINE

## 2020-12-21 PROCEDURE — 1036F TOBACCO NON-USER: CPT | Performed by: INTERNAL MEDICINE

## 2020-12-21 PROCEDURE — G8484 FLU IMMUNIZE NO ADMIN: HCPCS | Performed by: INTERNAL MEDICINE

## 2020-12-21 PROCEDURE — G8427 DOCREV CUR MEDS BY ELIG CLIN: HCPCS | Performed by: INTERNAL MEDICINE

## 2020-12-21 PROCEDURE — 93000 ELECTROCARDIOGRAM COMPLETE: CPT | Performed by: INTERNAL MEDICINE

## 2020-12-21 PROCEDURE — G8417 CALC BMI ABV UP PARAM F/U: HCPCS | Performed by: INTERNAL MEDICINE

## 2020-12-21 NOTE — PATIENT INSTRUCTIONS
Counseled to lose weight and walk for exercise regularly and continue follow-up at Coumadin clinic. Continue current medications. Appropriate prescriptions if needed on this visit are addressed. After visit summery is provided. Questions answered and patient verbalizes understanding. Follow up in 12 months with EKG,  sooner if needed.

## 2020-12-21 NOTE — ASSESSMENT & PLAN NOTE
Rate is well controlled and patient is on warfarin for anticoagulation therapy and is monitored closely in Coumadin clinic for PT/INR.

## 2020-12-21 NOTE — PROGRESS NOTES
Nakita Richardson  1940  Mathew Quijano MD    Chief complaint and HPI:  Nakita Richardson  is a [de-identified] y.o. male following up for hypertension and chronic persistent atrial fibrillation on Lopressor for rate control and warfarin for anticoagulation therapy. He follows up at Coumadin clinic for PT/INR regulation. Recently he had positive stool screening for blood and underwent colonoscopy and polypectomy and it was benign. Denies any cardiac symptoms. He has been compliant with medications. Had not had much swelling of legs since he had ablations done and does go to the park to walk. His weight has remained unchanged. He does not smoke. He has been compliant with his medications. Rest of the Cardiovascular system review is otherwise unchanged from prior encounter. Past medical history:  has a past medical history of Atrial flutter ([address] H/O Doppler ultrasound, H/O echocardiogram, H/O myocardial perfusion scan, Hypertension, Varicose veins, and Venous insufficiency. Past surgical history:  has a past surgical history that includes Cholecystectomy; hernia repair; Tooth Extraction; Cataract removal; Hemorrhoid surgery; Prostate Cryoablation (2016); cyst removal; Cardioversion (3/6/2012); and Colonoscopy (N/A, 2020). Social History:   Social History     Tobacco Use    Smoking status: Former Smoker     Years: 40.00     Quit date: 2000     Years since quittin.9    Smokeless tobacco: Never Used    Tobacco comment: smoked pipe   Substance Use Topics    Alcohol use: No     Family history: family history is not on file. ALLERGIES:  Latex, Dicyclomine, and Neosporin [bacitracin-neomycin-polymyxin]  Prior to Admission medications    Medication Sig Start Date End Date Taking?  Authorizing Provider   tiZANidine (ZANAFLEX) 2 MG tablet Take 2 mg by mouth every 6 hours as needed   Yes Historical Provider, MD warfarin (COUMADIN) 2.5 MG tablet Take 1 tablet by mouth daily 7/20/20  Yes Polo Owen MD   warfarin (COUMADIN) 1 MG tablet Take 1 tablet by mouth daily On Mondays and Fridays 7/20/20  Yes Polo Owen MD   oxybutynin (DITROPAN-XL) 5 MG extended release tablet Take 5 mg by mouth every evening   Yes Historical Provider, MD   cetirizine (ZYRTEC) 10 MG tablet Take 10 mg by mouth daily as needed for Allergies   Yes Historical Provider, MD   acetaminophen (ARTHRITIS PAIN) 650 MG extended release tablet Take 650 mg by mouth every 8 hours as needed for Pain   Yes Historical Provider, MD   olmesartan (BENICAR) 5 MG tablet Take 5 mg by mouth 2 times daily   Yes Historical Provider, MD   hydrochlorothiazide (HYDRODIURIL) 25 MG tablet Take 25 mg by mouth daily   Yes Historical Provider, MD   metoprolol (LOPRESSOR) 50 MG tablet Take 1 tablet by mouth 2 times daily 7/21/15  Yes Polo Owen MD     Vitals:    12/21/20 0943   BP: 122/60   Pulse: 75   Temp: 96.3 °F (35.7 °C)   Weight: 230 lb (104.3 kg)   Height: 5' 11\" (1.803 m)      Body mass index is 32.08 kg/m². Wt Readings from Last 3 Encounters:   12/21/20 230 lb (104.3 kg)   11/19/20 230 lb 14.4 oz (104.7 kg)   11/11/20 220 lb (99.8 kg)     Constitutional: Moderately obese pleasant male in no apparent distress. Weight has remained unchanged since November 2019. Eyes: Pupils are equal in both eyes.  He wears glasses. Patient is wearing a facemask. NECK: No JVP or thyromegaly  Cardiovascular:  Auscultation: Irregular  S1 and S2.  No significant murmurs or gallops noted. Annamae Derek negative for bruits.  Abdominal aorta is nonpalpable.  No epigastric bruit noted. Peripheral pulses: 2+ equal in both posterior tibial and dorsalis pedis location. Respiratory:  Respiratory effort is normal. Breath sounds are clear to auscultation. Extremities:  No edema, clubbing,  Cyanosis, petechiae.    SKIN: Warm and well perfused, no pallor or cyanosis Abdomen:  No masses or tenderness. No organomegaly noted. Musculoskeletal:  No spinal deformities noted.  Gait is normal.  Muscle strength is normal.  Neurologic:  Oriented to time, place, and person and non-anxious. No focal neurological deficit noted. Psychiatric: Normal mood and effect.      EKG done today is consistent with atrial fibrillation 75 bpm with RBBB. Compared to last year RBBB is new    LAB REVIEW:  CBC:   Lab Results   Component Value Date    WBC 5.4 09/18/2020    HGB 15.7 09/18/2020    HCT 48.9 09/18/2020     09/18/2020     Lipids:   Lab Results   Component Value Date    CHOL 175 03/17/2017    CHOLFAST 186 09/18/2020    TRIG 127 03/17/2017    TRIGLYCFAST 131 09/18/2020    HDL 34 (L) 09/18/2020   . COVID-19 test was negative on 11/4/2020. Renal:   Lab Results   Component Value Date    BUN 22 12/07/2020    CREATININE 1.4 12/07/2020     12/07/2020    K 4.3 12/07/2020     PT/INR: Patient had positive stool screening for blood on 9/19/2020. Lab Results   Component Value Date    INR 1.79 11/17/2020   Had colonoscopy in polypectomy on 11/11/2020 and warfarin was held. It suggested tubular adenoma. Lab Results   Component Value Date    LABA1C 6.2 12/07/2020     IMPRESSION and RECOMMENDATIONS:      Longstanding persistent atrial fibrillation ([address]  Rate is well controlled and patient is on warfarin for anticoagulation therapy and is monitored closely in Coumadin clinic for PT/INR. HTN (hypertension)  Well-controlled on current medications continue the same. Venous insufficiency  Leg swelling is tolerable.   He is using compression stockings and keep feet elevated when resting and has had ablations in the past. Counseled to lose weight and walk for exercise regularly and continue follow-up at Coumadin clinic. Continue current medications. Appropriate prescriptions if needed on this visit are addressed. After visit summery is provided. Questions answered and patient verbalizes understanding. Follow up in 12 months with EKG,  sooner if needed. Cloretta Alpers, MD, 12/21/2020 9:59 AM     Please note this report has been partially produced using speech recognition software and may contain errors related to that system including errors in grammar, punctuation, and spelling, as well as words and phrases that may be inappropriate. If there are any questions or concerns please feel free to contact the dictating provider for clarification.

## 2021-01-08 ENCOUNTER — TELEPHONE (OUTPATIENT)
Dept: PHARMACY | Age: 81
End: 2021-01-08

## 2021-01-08 DIAGNOSIS — I48.91 ATRIAL FIBRILLATION, UNSPECIFIED TYPE (HCC): Primary | ICD-10-CM

## 2021-01-08 NOTE — TELEPHONE ENCOUNTER
Referral renewal sent for cosign. Signed 1/11.      CLINICAL PHARMACY CONSULT: MED RECONCILIATION/REVIEW ADDENDUM    For Pharmacy Admin Tracking Only    PHSO: NoTime Spent (min): 5    Je MonteiroD

## 2021-02-08 ENCOUNTER — TELEPHONE (OUTPATIENT)
Dept: PHARMACY | Age: 81
End: 2021-02-08

## 2021-02-09 ENCOUNTER — ANTI-COAG VISIT (OUTPATIENT)
Dept: PHARMACY | Age: 81
End: 2021-02-09
Payer: MEDICARE

## 2021-02-09 DIAGNOSIS — I48.11 LONGSTANDING PERSISTENT ATRIAL FIBRILLATION (HCC): ICD-10-CM

## 2021-02-09 LAB
INR BLD: 2.99 INDEX
PROTHROMBIN TIME: 34.9 SECONDS (ref 11.7–14.5)

## 2021-02-09 PROCEDURE — 36415 COLL VENOUS BLD VENIPUNCTURE: CPT

## 2021-02-09 PROCEDURE — 99211 OFF/OP EST MAY X REQ PHY/QHP: CPT

## 2021-02-09 PROCEDURE — 85610 PROTHROMBIN TIME: CPT

## 2021-02-09 NOTE — PROGRESS NOTES
Medication Management Service  [address] [phone]    Visit Date: 2/9/2021   Subjective: All appointments have been changed to telephone visits at this time due to COVID-19. Austyn Mcdaniels is a [de-identified] y.o. male who is having INR checked by [facility]. INR results were sent to the clinic for anticoagulation monitoring and adjustment. Patient results called in to clinic for warfarin management due to  Indication:   atrial fibrillation. INR goal: of 2.0-3.0. Duration of therapy: indefinite. Patient reports the following:   Adherent with regimen  Missed or extra doses:  None   Bleeding or thromboembolic side effects:  None  Significant medication changes:  None  Significant dietary changes: None  Significant alcohol or tobacco changes: None  Significant recent illness, disease state changes, or hospitalization:  None  Upcoming surgeries or procedures:  None  Falls: None           Assessment and Plan     PT/INR performed by Lab. INR today is 2.99, therapeutic. Plan: Will continue current regimen of warfarin 2.5mg daily except 3.5mg Mondays and Fridays. Recheck INR in 12 week(s). Patient has standing lab orders for PT/INR. Patient verbalized understanding of dosing directions and information discussed. Progress note sent to referring office. Patient acknowledges working in consult agreement with pharmacist as referred by his/her physician. Patient accepted that for purposes of billing, this is a virtual visit with your provider for which we will submit a claim for reimbursement with your insurance company. You may be responsible for any copays, coinsurance amounts or other amounts not covered by your insurance company.      Electronically signed by Xiao Lorenz, [facility] on 2/9/21 at 1:30 PM EST    CLINICAL PHARMACY CONSULT: MED RECONCILIATION/REVIEW Tim  22. Tracking Only

## 2021-03-09 ENCOUNTER — HOSPITAL ENCOUNTER (OUTPATIENT)
Age: 81
Discharge: HOME | End: 2021-03-09
Payer: MEDICARE

## 2021-03-09 LAB
ALBUMIN SERPL-MCNC: 4.7 GM/DL (ref 3.4–5)
ALP BLD-CCNC: 91 IU/L (ref 40–129)
ALT SERPL-CCNC: 15 U/L (ref 10–40)
ANION GAP SERPL CALCULATED.3IONS-SCNC: 16 MMOL/L (ref 4–16)
AST SERPL-CCNC: 20 IU/L (ref 15–37)
BASOPHILS ABSOLUTE: 0.1 K/CU MM
BASOPHILS RELATIVE PERCENT: 1.1 % (ref 0–1)
BILIRUB SERPL-MCNC: 0.7 MG/DL (ref 0–1)
BUN BLDV-MCNC: 25 MG/DL (ref 6–23)
CALCIUM SERPL-MCNC: 9.6 MG/DL (ref 8.3–10.6)
CHLORIDE BLD-SCNC: 104 MMOL/L (ref 99–110)
CHOLESTEROL, FASTING: 188 MG/DL
CO2: 20 MMOL/L (ref 21–32)
CREAT SERPL-MCNC: 1.3 MG/DL (ref 0.9–1.3)
CREATININE URINE: 149.9 MG/DL (ref 39–259)
DIFFERENTIAL TYPE: ABNORMAL
EOSINOPHILS ABSOLUTE: 0.2 K/CU MM
EOSINOPHILS RELATIVE PERCENT: 4.4 % (ref 0–3)
ESTIMATED AVERAGE GLUCOSE: 131 MG/DL
GFR AFRICAN AMERICAN: >60 ML/MIN/1.73M2
GFR NON-AFRICAN AMERICAN: 53 ML/MIN/1.73M2
GLUCOSE FASTING: 91 MG/DL (ref 70–99)
HBA1C MFR BLD: 6.2 % (ref 4.2–6.3)
HCT VFR BLD CALC: 49 % (ref 42–52)
HDLC SERPL-MCNC: 33 MG/DL
HEMOGLOBIN: 16 GM/DL (ref 13.5–18)
IMMATURE NEUTROPHIL %: 0.2 % (ref 0–0.43)
LDL CHOLESTEROL DIRECT: 138 MG/DL
LYMPHOCYTES ABSOLUTE: 1.9 K/CU MM
LYMPHOCYTES RELATIVE PERCENT: 42.3 % (ref 24–44)
MCH RBC QN AUTO: 33.1 PG (ref 27–31)
MCHC RBC AUTO-ENTMCNC: 32.7 % (ref 32–36)
MCV RBC AUTO: 101.2 FL (ref 78–100)
MICROALBUMIN/CREAT 24H UR: 3.5 MG/DL
MICROALBUMIN/CREAT UR-RTO: 23.3 MG/G CREAT (ref 0–30)
MONOCYTES ABSOLUTE: 0.5 K/CU MM
MONOCYTES RELATIVE PERCENT: 11.3 % (ref 0–4)
PDW BLD-RTO: 12.7 % (ref 11.7–14.9)
PLATELET # BLD: 184 K/CU MM (ref 140–440)
PMV BLD AUTO: 9.6 FL (ref 7.5–11.1)
POTASSIUM SERPL-SCNC: 4.3 MMOL/L (ref 3.5–5.1)
RBC # BLD: 4.84 M/CU MM (ref 4.6–6.2)
SEGMENTED NEUTROPHILS ABSOLUTE COUNT: 1.9 K/CU MM
SEGMENTED NEUTROPHILS RELATIVE PERCENT: 40.7 % (ref 36–66)
SODIUM BLD-SCNC: 140 MMOL/L (ref 135–145)
TOTAL CK: 121 IU/L (ref 38–174)
TOTAL IMMATURE NEUTOROPHIL: 0.01 K/CU MM
TOTAL PROTEIN: 6.8 GM/DL (ref 6.4–8.2)
TRIGLYCERIDE, FASTING: 135 MG/DL
WBC # BLD: 4.6 K/CU MM (ref 4–10.5)

## 2021-03-09 PROCEDURE — 82570 ASSAY OF URINE CREATININE: CPT

## 2021-03-09 PROCEDURE — 80061 LIPID PANEL: CPT

## 2021-03-09 PROCEDURE — 82550 ASSAY OF CK (CPK): CPT

## 2021-03-09 PROCEDURE — 36415 COLL VENOUS BLD VENIPUNCTURE: CPT

## 2021-03-09 PROCEDURE — 80053 COMPREHEN METABOLIC PANEL: CPT

## 2021-03-09 PROCEDURE — 82043 UR ALBUMIN QUANTITATIVE: CPT

## 2021-03-09 PROCEDURE — 85025 COMPLETE CBC W/AUTO DIFF WBC: CPT

## 2021-03-09 PROCEDURE — 83036 HEMOGLOBIN GLYCOSYLATED A1C: CPT

## 2021-03-16 DIAGNOSIS — I48.91 ATRIAL FIBRILLATION, UNSPECIFIED TYPE (HCC): ICD-10-CM

## 2021-03-16 DIAGNOSIS — I48.11 LONGSTANDING PERSISTENT ATRIAL FIBRILLATION (HCC): Primary | ICD-10-CM

## 2021-03-16 NOTE — PROGRESS NOTES
Standing order for PT/INR has been placed for remote INR monitoring given efforts to reduce the spread of COVID-19.     CLINICAL PHARMACY CONSULT: MED RECONCILIATION/REVIEW ADDENDUM    For Pharmacy Admin Tracking Only    PHSO: No  Total # of Interventions Recommended: 0  - Maintenance Safety Lab Monitoring #: 1  Total Interventions Accepted: 0  Time Spent (min): 5    Je ChristiansonD

## 2021-03-22 RX ORDER — WARFARIN SODIUM 1 MG/1
1 TABLET ORAL DAILY
Qty: 30 TABLET | Refills: 1 | Status: SHIPPED | OUTPATIENT
Start: 2021-03-22 | End: 2021-06-29

## 2021-05-04 ENCOUNTER — ANTI-COAG VISIT (OUTPATIENT)
Dept: PHARMACY | Age: 81
End: 2021-05-04
Payer: MEDICARE

## 2021-05-04 ENCOUNTER — TELEPHONE (OUTPATIENT)
Dept: PHARMACY | Age: 81
End: 2021-05-04

## 2021-05-04 DIAGNOSIS — I48.11 LONGSTANDING PERSISTENT ATRIAL FIBRILLATION (HCC): ICD-10-CM

## 2021-05-04 LAB
INR BLD: 2.9 INDEX
PROTHROMBIN TIME: 33.8 SECONDS (ref 11.7–14.5)

## 2021-05-04 PROCEDURE — 85610 PROTHROMBIN TIME: CPT

## 2021-05-04 PROCEDURE — 36415 COLL VENOUS BLD VENIPUNCTURE: CPT

## 2021-05-04 PROCEDURE — 99211 OFF/OP EST MAY X REQ PHY/QHP: CPT

## 2021-06-08 ENCOUNTER — TELEPHONE (OUTPATIENT)
Dept: PHARMACY | Age: 81
End: 2021-06-08

## 2021-06-08 NOTE — TELEPHONE ENCOUNTER
Called and left VM requesting call back to schedule in person appointment time.     For Pharmacy Admin Tracking Only     Time Spent (min): 5

## 2021-06-14 ENCOUNTER — HOSPITAL ENCOUNTER (OUTPATIENT)
Age: 81
Discharge: HOME | End: 2021-06-14
Payer: MEDICARE

## 2021-06-14 PROCEDURE — 80061 LIPID PANEL: CPT

## 2021-06-29 RX ORDER — WARFARIN SODIUM 1 MG/1
1 TABLET ORAL DAILY
Qty: 30 TABLET | Refills: 1 | Status: SHIPPED | OUTPATIENT
Start: 2021-06-29 | End: 2022-07-05 | Stop reason: ALTCHOICE

## 2021-07-27 ENCOUNTER — ANTI-COAG VISIT (OUTPATIENT)
Dept: PHARMACY | Age: 81
End: 2021-07-27
Payer: MEDICARE

## 2021-07-27 DIAGNOSIS — I48.11 LONGSTANDING PERSISTENT ATRIAL FIBRILLATION (HCC): Primary | ICD-10-CM

## 2021-07-27 LAB
INR BLD: 3.2
POC INR: 3.2 INDEX
PROTHROMBIN TIME, POC: 38.8 SECONDS (ref 10–14.3)
PROTIME: 38.8 SECONDS

## 2021-07-27 PROCEDURE — 85610 PROTHROMBIN TIME: CPT

## 2021-07-27 PROCEDURE — 99211 OFF/OP EST MAY X REQ PHY/QHP: CPT

## 2021-07-27 PROCEDURE — 36416 COLLJ CAPILLARY BLOOD SPEC: CPT

## 2021-07-27 NOTE — PROGRESS NOTES
Medication Management Service  [facility]  [phone]    Visit Date: 7/27/2021   Subjective:       Polo Hill is a [de-identified] y.o. male who presents to clinic today for anticoagulation monitoring and adjustment. Patient seen in clinic for warfarin management due to  Indication:   atrial fibrillation. INR goal: of 2.0-3.0. Duration of therapy: indefinite. Patient reports the following:   Adherent with regimen  Missed or extra doses:  None   Bleeding or thromboembolic side effects:  None  Significant medication changes:  None  Significant dietary changes: Decreased vitamin K intake; plans to resume previous intake. Significant alcohol or tobacco changes: None  Significant recent illness, disease state changes, or hospitalization:  None  Upcoming surgeries or procedures:  None  Falls: None           Assessment and Plan     PT/INR done in office per protocol. INR today is 3.2, slightly above goal range. Plan:  Take warfarin 1.25mg x 1 then continue current regimen of warfarin 2.5mg daily except 3.5mg Mondays and Fridays. Recheck INR in 12 week(s). Patient verbalized understanding of dosing directions and information discussed. Dosing schedule given to patient including phone number, appointment date, and time. Progress note sent to referring office. Patient acknowledges working in consult agreement with pharmacist as referred by his/her physician.       Electronically signed by Macario Aguilera [address] [place] [address] on 7/27/21 at 4:35 PM EDT    For Pharmacy Admin Tracking Only     Intervention Detail: Dose Adjustment: 1, reason: Therapy Optimization   Total # of Interventions Recommended: 1   Total # of Interventions Accepted: 1   Time Spent (min): 15

## 2021-08-05 RX ORDER — WARFARIN SODIUM 2.5 MG/1
TABLET ORAL
Qty: 90 TABLET | Refills: 1 | Status: SHIPPED | OUTPATIENT
Start: 2021-08-05 | End: 2021-11-15

## 2021-09-03 DIAGNOSIS — I48.91 ATRIAL FIBRILLATION, UNSPECIFIED TYPE (HCC): Primary | ICD-10-CM

## 2021-10-19 ENCOUNTER — ANTI-COAG VISIT (OUTPATIENT)
Dept: PHARMACY | Age: 81
End: 2021-10-19
Payer: MEDICARE

## 2021-10-19 DIAGNOSIS — I48.11 LONGSTANDING PERSISTENT ATRIAL FIBRILLATION (HCC): Primary | ICD-10-CM

## 2021-10-19 LAB
INR BLD: 2.2
POC INR: 2.2 INDEX
PROTHROMBIN TIME, POC: 25.9 SECONDS (ref 10–14.3)
PROTIME: 25.9 SECONDS

## 2021-10-19 PROCEDURE — 36416 COLLJ CAPILLARY BLOOD SPEC: CPT

## 2021-10-19 PROCEDURE — 85610 PROTHROMBIN TIME: CPT

## 2021-10-19 PROCEDURE — 99212 OFFICE O/P EST SF 10 MIN: CPT

## 2021-10-19 RX ORDER — WARFARIN SODIUM 1 MG/1
TABLET ORAL
Qty: 24 TABLET | Refills: 2 | Status: SHIPPED | OUTPATIENT
Start: 2021-10-19 | End: 2022-07-05 | Stop reason: ALTCHOICE

## 2021-11-15 RX ORDER — WARFARIN SODIUM 2.5 MG/1
TABLET ORAL
Qty: 90 TABLET | Refills: 1 | Status: SHIPPED | OUTPATIENT
Start: 2021-11-15 | End: 2022-07-05 | Stop reason: ALTCHOICE

## 2021-12-21 ENCOUNTER — OFFICE VISIT (OUTPATIENT)
Dept: CARDIOLOGY CLINIC | Age: 81
End: 2021-12-21
Payer: MEDICARE

## 2021-12-21 VITALS
WEIGHT: 224 LBS | HEIGHT: 71 IN | DIASTOLIC BLOOD PRESSURE: 64 MMHG | BODY MASS INDEX: 31.36 KG/M2 | SYSTOLIC BLOOD PRESSURE: 120 MMHG | HEART RATE: 79 BPM

## 2021-12-21 DIAGNOSIS — I48.91 ATRIAL FIBRILLATION, UNSPECIFIED TYPE (HCC): Primary | ICD-10-CM

## 2021-12-21 DIAGNOSIS — I10 PRIMARY HYPERTENSION: ICD-10-CM

## 2021-12-21 DIAGNOSIS — E78.49 OTHER HYPERLIPIDEMIA: ICD-10-CM

## 2021-12-21 PROCEDURE — 93000 ELECTROCARDIOGRAM COMPLETE: CPT | Performed by: INTERNAL MEDICINE

## 2021-12-21 PROCEDURE — 1036F TOBACCO NON-USER: CPT | Performed by: INTERNAL MEDICINE

## 2021-12-21 PROCEDURE — G8484 FLU IMMUNIZE NO ADMIN: HCPCS | Performed by: INTERNAL MEDICINE

## 2021-12-21 PROCEDURE — 99214 OFFICE O/P EST MOD 30 MIN: CPT | Performed by: INTERNAL MEDICINE

## 2021-12-21 PROCEDURE — G8417 CALC BMI ABV UP PARAM F/U: HCPCS | Performed by: INTERNAL MEDICINE

## 2021-12-21 PROCEDURE — G8427 DOCREV CUR MEDS BY ELIG CLIN: HCPCS | Performed by: INTERNAL MEDICINE

## 2021-12-21 PROCEDURE — 1123F ACP DISCUSS/DSCN MKR DOCD: CPT | Performed by: INTERNAL MEDICINE

## 2021-12-21 PROCEDURE — 4040F PNEUMOC VAC/ADMIN/RCVD: CPT | Performed by: INTERNAL MEDICINE

## 2021-12-21 RX ORDER — ATORVASTATIN CALCIUM 10 MG/1
10 TABLET, FILM COATED ORAL DAILY
COMMUNITY

## 2021-12-21 NOTE — PROGRESS NOTES
branch block. Compared to last year there is no significant change. Pertinent records reviewed and discussed with patient and results are as follow:    Lab Results   Component Value Date    WBC 4.6 03/09/2021    HGB 16.0 03/09/2021    HCT 49.0 03/09/2021     03/09/2021     Lab Results   Component Value Date    CHOL 175 03/17/2017    CHOLFAST 188 03/09/2021    TRIG 127 03/17/2017    TRIGLYCFAST 135 03/09/2021    HDL 33 (L) 03/09/2021    LDLDIRECT 138 (H) 03/09/2021     Lab Results   Component Value Date    BUN 25 03/09/2021    CREATININE 1.3 03/09/2021     03/09/2021    K 4.3 03/09/2021     Lab Results   Component Value Date    INR 2.2 10/19/2021    INR 2.2 10/19/2021     Lab Results   Component Value Date    LABA1C 6.2 03/09/2021     ASSESSMENT/PLAN:    1. Atrial fibrillation, unspecified type [facility])  Assessment & Plan:  Rate is well controlled patient is anticoagulated with therapeutic INR on warfarin. Continue both and follow-up with Coumadin clinic for PT/INR regulation. Orders:  -     PROTIME-INR  2. Primary hypertension  Assessment & Plan:  Well-controlled on the Lopressor, Benicar and hydrochlorothiazide. Continue both. 3. Other hyperlipidemia  Assessment & Plan:  Repeat lipids with PCP on Lipitor. Follows up with PCP. Continue current cardiovascular medications which have been reviewed and discussed individually with you. Follow up with coumadin clinic for PT/INR. Follow-up in 12 months with EKG, sooner if needed. An electronic signature was used to authenticate this note.     --Shazia Forde MD

## 2021-12-21 NOTE — ASSESSMENT & PLAN NOTE
Rate is well controlled patient is anticoagulated with therapeutic INR on warfarin. Continue both and follow-up with Coumadin clinic for PT/INR regulation.

## 2021-12-21 NOTE — PATIENT INSTRUCTIONS
Continue current cardiovascular medications which have been reviewed and discussed individually with you. Follow up with coumadin clinic for PT/INR. Follow-up in 12 months with EKG, sooner if needed.

## 2022-01-11 ENCOUNTER — ANTI-COAG VISIT (OUTPATIENT)
Dept: PHARMACY | Age: 82
End: 2022-01-11
Payer: MEDICARE

## 2022-01-11 LAB
INR BLD: 3.3
POC INR: 3.3 INDEX
PROTHROMBIN TIME, POC: 39.9 SECONDS (ref 10–14.3)
PROTIME: 39.9 SECONDS

## 2022-01-11 PROCEDURE — 85610 PROTHROMBIN TIME: CPT

## 2022-01-11 PROCEDURE — 36416 COLLJ CAPILLARY BLOOD SPEC: CPT

## 2022-01-11 PROCEDURE — 99212 OFFICE O/P EST SF 10 MIN: CPT

## 2022-01-11 NOTE — PROGRESS NOTES
Medication Management Service  [facility]  [phone]    Visit Date: 1/11/2022   Subjective:       Jenaro Vallecillo is a 80 y.o. male who presents to clinic today for anticoagulation monitoring and adjustment. Patient seen in clinic for warfarin management due to  Indication:   atrial fibrillation. INR goal: of 2.0-3.0. Duration of therapy: indefinite. Patient reports the following:   Adherent with regimen  Missed or extra doses:  None   Bleeding or thromboembolic side effects:  None  Significant medication changes:  Patient started atorvastatin a few weeks ago; does not interact with warfarin  Significant dietary changes: Decreased vitamin K intake; plans to resume previous intake  Significant alcohol or tobacco changes: None  Significant recent illness, disease state changes, or hospitalization:  None  Upcoming surgeries or procedures:  None  Falls: None           Assessment and Plan     PT/INR done in office per protocol. INR today is 3.3, supratherapeutic. Plan:  Take warfarin 1.25mg x 1 then will continue current regimen of warfarin 2.5mg daily except 3.5mg Mondays and Fridays. Recheck INR in 12 week(s). Patient verbalized understanding of dosing directions and information discussed. Dosing schedule given to patient including phone number, appointment date, and time. Progress note sent to referring office. Patient acknowledges working in consult agreement with pharmacist as referred by his/her physician.       Electronically signed by KAROLINE [facility] on 1/11/22 at 4:31 PM EST    For Pharmacy Admin Tracking Only     Intervention Detail: Dose Adjustment: 1, reason: Therapy Optimization   Total # of Interventions Recommended: 1   Total # of Interventions Accepted: 1   Time Spent (min): 15

## 2022-02-21 ENCOUNTER — APPOINTMENT (OUTPATIENT)
Dept: CT IMAGING | Age: 82
End: 2022-02-21
Payer: MEDICARE

## 2022-02-21 ENCOUNTER — HOSPITAL ENCOUNTER (EMERGENCY)
Age: 82
Discharge: HOME | End: 2022-02-21
Attending: EMERGENCY MEDICINE
Payer: MEDICARE

## 2022-02-21 ENCOUNTER — APPOINTMENT (OUTPATIENT)
Dept: GENERAL RADIOLOGY | Age: 82
End: 2022-02-21
Payer: MEDICARE

## 2022-02-21 VITALS
RESPIRATION RATE: 18 BRPM | HEART RATE: 90 BPM | DIASTOLIC BLOOD PRESSURE: 93 MMHG | WEIGHT: 210 LBS | SYSTOLIC BLOOD PRESSURE: 144 MMHG | OXYGEN SATURATION: 100 % | HEIGHT: 71 IN | BODY MASS INDEX: 29.4 KG/M2 | TEMPERATURE: 97.8 F

## 2022-02-21 DIAGNOSIS — S09.90XA CLOSED HEAD INJURY, INITIAL ENCOUNTER: ICD-10-CM
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
## 2022-02-21 DIAGNOSIS — M54.50 ACUTE MIDLINE LOW BACK PAIN WITHOUT SCIATICA: ICD-10-CM

## 2022-02-21 DIAGNOSIS — K40.90 NON-RECURRENT UNILATERAL INGUINAL HERNIA WITHOUT OBSTRUCTION OR GANGRENE: ICD-10-CM

## 2022-02-21 DIAGNOSIS — E27.8 ADRENAL NODULE (HCC): ICD-10-CM

## 2022-02-21 LAB
ALBUMIN SERPL-MCNC: 4.3 GM/DL (ref 3.4–5)
ALP BLD-CCNC: 82 IU/L (ref 40–129)
ALT SERPL-CCNC: 22 U/L (ref 10–40)
ANION GAP SERPL CALCULATED.3IONS-SCNC: 11 MMOL/L (ref 4–16)
AST SERPL-CCNC: 20 IU/L (ref 15–37)
BASOPHILS ABSOLUTE: 0.1 K/CU MM
BASOPHILS RELATIVE PERCENT: 0.5 % (ref 0–1)
BILIRUB SERPL-MCNC: 0.7 MG/DL (ref 0–1)
BUN BLDV-MCNC: 28 MG/DL (ref 6–23)
CALCIUM SERPL-MCNC: 9.7 MG/DL (ref 8.3–10.6)
CHLORIDE BLD-SCNC: 101 MMOL/L (ref 99–110)
CO2: 26 MMOL/L (ref 21–32)
CREAT SERPL-MCNC: 1.3 MG/DL (ref 0.9–1.3)
DIFFERENTIAL TYPE: ABNORMAL
EOSINOPHILS ABSOLUTE: 0 K/CU MM
EOSINOPHILS RELATIVE PERCENT: 0.2 % (ref 0–3)
GFR AFRICAN AMERICAN: >60 ML/MIN/1.73M2
GFR NON-AFRICAN AMERICAN: 53 ML/MIN/1.73M2
GLUCOSE BLD-MCNC: 121 MG/DL (ref 70–99)
HCT VFR BLD CALC: 46 % (ref 42–52)
HEMOGLOBIN: 15.1 GM/DL (ref 13.5–18)
IMMATURE NEUTROPHIL %: 0.3 % (ref 0–0.43)
LYMPHOCYTES ABSOLUTE: 1.6 K/CU MM
LYMPHOCYTES RELATIVE PERCENT: 16.6 % (ref 24–44)
MCH RBC QN AUTO: 32.8 PG (ref 27–31)
MCHC RBC AUTO-ENTMCNC: 32.8 % (ref 32–36)
MCV RBC AUTO: 100 FL (ref 78–100)
MONOCYTES ABSOLUTE: 0.5 K/CU MM
MONOCYTES RELATIVE PERCENT: 5.6 % (ref 0–4)
PDW BLD-RTO: 12.4 % (ref 11.7–14.9)
PLATELET # BLD: 179 K/CU MM (ref 140–440)
PMV BLD AUTO: 9.3 FL (ref 7.5–11.1)
POTASSIUM SERPL-SCNC: 4.4 MMOL/L (ref 3.5–5.1)
RBC # BLD: 4.6 M/CU MM (ref 4.6–6.2)
SEGMENTED NEUTROPHILS ABSOLUTE COUNT: 7.4 K/CU MM
SEGMENTED NEUTROPHILS RELATIVE PERCENT: 76.8 % (ref 36–66)
SODIUM BLD-SCNC: 138 MMOL/L (ref 135–145)
TOTAL IMMATURE NEUTOROPHIL: 0.03 K/CU MM
TOTAL PROTEIN: 6.6 GM/DL (ref 6.4–8.2)
WBC # BLD: 9.7 K/CU MM (ref 4–10.5)

## 2022-02-21 PROCEDURE — 72170 X-RAY EXAM OF PELVIS: CPT

## 2022-02-21 PROCEDURE — 99285 EMERGENCY DEPT VISIT HI MDM: CPT

## 2022-02-21 PROCEDURE — 74177 CT ABD & PELVIS W/CONTRAST: CPT

## 2022-02-21 PROCEDURE — 6360000004 HC RX CONTRAST MEDICATION: Performed by: EMERGENCY MEDICINE

## 2022-02-21 PROCEDURE — 85025 COMPLETE CBC W/AUTO DIFF WBC: CPT

## 2022-02-21 PROCEDURE — 80053 COMPREHEN METABOLIC PANEL: CPT

## 2022-02-21 PROCEDURE — 6370000000 HC RX 637 (ALT 250 FOR IP): Performed by: EMERGENCY MEDICINE

## 2022-02-21 PROCEDURE — 70450 CT HEAD/BRAIN W/O DYE: CPT

## 2022-02-21 RX ORDER — ACETAMINOPHEN 325 MG/1
650 TABLET ORAL ONCE
Status: COMPLETED | OUTPATIENT
Start: 2022-02-21 | End: 2022-02-21

## 2022-02-21 RX ADMIN — ACETAMINOPHEN 650 MG: 325 TABLET ORAL at 07:55

## 2022-02-21 RX ADMIN — IOPAMIDOL 100 ML: 755 INJECTION, SOLUTION INTRAVENOUS at 09:59

## 2022-02-21 ASSESSMENT — PAIN - FUNCTIONAL ASSESSMENT: PAIN_FUNCTIONAL_ASSESSMENT: 0-10

## 2022-02-21 ASSESSMENT — ENCOUNTER SYMPTOMS
BACK PAIN: 1
RESPIRATORY NEGATIVE: 1

## 2022-02-21 ASSESSMENT — PAIN SCALES - GENERAL
PAINLEVEL_OUTOF10: 9
PAINLEVEL_OUTOF10: 9

## 2022-02-21 ASSESSMENT — PAIN DESCRIPTION - LOCATION: LOCATION: GENERALIZED

## 2022-02-21 ASSESSMENT — PAIN DESCRIPTION - DESCRIPTORS: DESCRIPTORS: ACHING

## 2022-02-21 NOTE — ED TRIAGE NOTES
Patient arrived to room 6 by [facility] from home with complaints of muscle pain throughout the body after falling trying to get into bed last night.

## 2022-02-21 NOTE — ED PROVIDER NOTES
Triage Chief Complaint:   Fall (C/o falling last night around midnight trying to get into bed. Patient states he hit his head on the matress but denies LOC, does take Coumadin. Patient was able to get himself up and into bed. At this current time patient c/o pain all over. ) and Back Pain    [place]:  Kash Acosta is a 80 y.o. male that presents to the ED by 909. Patient is complaining of pain throughout his entire body. He fell out of bed and hit his head on a mattress. He is on Coumadin. He denies any injuries to his upper lower extremities. Is able to ambulate. He apparently just had some difficulty getting to bed his family call for them to be checked. He denies any nausea vomiting chest pain cough shortness of breath. No other high risk features or red flags      Past Medical History:   Diagnosis Date    Atrial flutter ([address]     H/O Doppler ultrasound 10/26/15    Peripheral vascular.  H/O echocardiogram 2/7/12    Mild concentric LVH, mild RV and aortic root dilatation, mild tricuspid regurg. w/pulmonary HTN, EF 50-55%.  H/O myocardial perfusion scan 2/7/12    Lexiscan Thallium. Normal perfusion, EF 44%. Patient is in atrial flutter w/rapid rate.  Hypertension     Varicose veins 11/17/2015    Both lower extremities left>right     Venous insufficiency 11/14/2017     Past Surgical History:   Procedure Laterality Date    CARDIOVERSION  3/6/2012         CATARACT REMOVAL      right eye    CHOLECYSTECTOMY      COLONOSCOPY N/A 11/11/2020    COLONOSCOPY POLYPECTOMY ABLATION performed by Lela Zelaya MD at [address]      PROSTATE CRYOABLATION  09/22/2016    TOOTH EXTRACTION       History reviewed. No pertinent family history.   Social History     Socioeconomic History    Marital status:      Spouse name: Not on file    Number of children: Not on file    Years of education: Not on file    Highest education level: Not on file Occupational History    Occupation: Retired   Tobacco Use    Smoking status: Former Smoker     Years: 40.00     Quit date: 2000     Years since quittin.1    Smokeless tobacco: Never Used    Tobacco comment: smoked pipe   Vaping Use    Vaping Use: Never used   Substance and Sexual Activity    Alcohol use: No    Drug use: No    Sexual activity: Yes     Partners: Female     Comment: marriied   Other Topics Concern    Not on file   Social History Narrative    Not on file     Social Determinants of Health     Financial Resource Strain:     Difficulty of Paying Living Expenses: Not on file   Food Insecurity:     Worried About Running Out of Food in the Last Year: Not on file    Jesse of Food in the Last Year: Not on file   Transportation Needs:     Lack of Transportation (Medical): Not on file    Lack of Transportation (Non-Medical): Not on file   Physical Activity:     Days of Exercise per Week: Not on file    Minutes of Exercise per Session: Not on file   Stress:     Feeling of Stress : Not on file   Social Connections:     Frequency of Communication with Friends and Family: Not on file    Frequency of Social Gatherings with Friends and Family: Not on file    Attends Zoroastrian Services: Not on file    Active Member of [address] [place] [address] [employer] or Organizations: Not on file    Attends Club or Organization Meetings: Not on file    Marital Status: Not on file   Intimate Partner Violence:     Fear of Current or Ex-Partner: Not on file    Emotionally Abused: Not on file    Physically Abused: Not on file    Sexually Abused: Not on file   Housing Stability:     Unable to Pay for Housing in the Last Year: Not on file    Number of [place] in the Last Year: Not on file    Unstable Housing in the Last Year: Not on file     No current facility-administered medications for this encounter.      Current Outpatient Medications   Medication Sig Dispense Refill    atorvastatin (LIPITOR) 10 MG tablet Take 10 Behavior normal.         Thought Content:  Thought content normal.         Judgment: Judgment normal.         I have reviewed and interpreted all of the currently available lab results from this visit (ifapplicable):  Results for orders placed or performed during the hospital encounter of 02/21/22   CBC with Auto Differential   Result Value Ref Range    WBC 9.7 4.0 - 10.5 K/CU MM    RBC 4.60 4.6 - 6.2 M/CU MM    Hemoglobin 15.1 13.5 - 18.0 GM/DL    Hematocrit 46.0 42 - 52 %    .0 78 - 100 FL    MCH 32.8 (H) 27 - 31 PG    MCHC 32.8 32.0 - 36.0 %    RDW 12.4 11.7 - 14.9 %    Platelets 672 222 - 959 K/CU MM    MPV 9.3 7.5 - 11.1 FL    Differential Type AUTOMATED DIFFERENTIAL     Segs Relative 76.8 (H) 36 - 66 %    Lymphocytes % 16.6 (L) 24 - 44 %    Monocytes % 5.6 (H) 0 - 4 %    Eosinophils % 0.2 0 - 3 %    Basophils % 0.5 0 - 1 %    Segs Absolute 7.4 K/CU MM    Lymphocytes Absolute 1.6 K/CU MM    Monocytes Absolute 0.5 K/CU MM    Eosinophils Absolute 0.0 K/CU MM    Basophils Absolute 0.1 K/CU MM    Immature Neutrophil % 0.3 0 - 0.43 %    Total Immature Neutrophil 0.03 K/CU MM   Comprehensive Metabolic Panel w/ Reflex to MG   Result Value Ref Range    Sodium 138 135 - 145 MMOL/L    Potassium 4.4 3.5 - 5.1 MMOL/L    Chloride 101 99 - 110 mMol/L    CO2 26 21 - 32 MMOL/L    BUN 28 (H) 6 - 23 MG/DL    CREATININE 1.3 0.9 - 1.3 MG/DL    Glucose 121 (H) 70 - 99 MG/DL    Calcium 9.7 8.3 - 10.6 MG/DL    Albumin 4.3 3.4 - 5.0 GM/DL    Total Protein 6.6 6.4 - 8.2 GM/DL    Total Bilirubin 0.7 0.0 - 1.0 MG/DL    ALT 22 10 - 40 U/L    AST 20 15 - 37 IU/L    Alkaline Phosphatase 82 40 - 129 IU/L    GFR Non- 53 (L) >60 mL/min/1.73m2    GFR African American >60 >60 mL/min/1.73m2    Anion Gap 11 4 - 16      Radiographs (if obtained):  [] The following radiograph wasinterpreted by myself in the absence of a radiologist:   [] Radiologist's Report Reviewed:  CT ABDOMEN PELVIS W IV CONTRAST Additional Contrast? None Preliminary Result   1. Right inguinal hernia containing a portion of the urinary bladder. The   involved urinary bladder wall is mildly thickened. 2. Status post cholecystectomy. The biliary tree measures up to 1.4 cm which   could be related to cholecystectomy status. No definite calcified ductal   stones are demonstrated. 3. Findings suggestive of chronic pancreatitis. 4. There are 2 indeterminate left adrenal gland nodules, the largest   measuring up to 1.2 cm. If not previously characterized, findings could be   further evaluated with nonemergent CT/MRI adrenal protocol. 5. Colonic diverticulosis without evidence of acute diverticulitis. 6. Prostatomegaly. CT HEAD WO CONTRAST   Final Result   No acute intracranial abnormality. XR PELVIS (1-2 VIEWS)   Preliminary Result   No acute osseous abnormality. Indeterminate 2.9 x 2.3 cm sclerotic lesion in the left femoral neck either   new or increased when compared to 09/03/2008. Metastatic disease cannot be   excluded in the appropriate clinical setting. EKG (if obtained): (All EKG's are interpreted by myself in the absence of a cardiologist)    Chart review shows recent radiographs:  No results found. MDM:    Patient presents to the ED status post fall. Considering his age and underlying coagulopathy being on warfarin emergent CT is everything 14 fortunately was negative. Dilated cranial hemorrhage as discussed. He had a lesion in his left arm while head CT was obtained there was no comment about that but fortunately no metastatic lesions he has numerous other issues that will need to be follow-up as an outpatient such as partial hernia in his right inguinal area with no urinary bladder present he is asymptomatic.   Also he is numerous other issues that need to be addressed as well as adrenal nodules      Please note that portions of this note may have been completed with a voice recognition/dictation program. Efforts were made to edit the dictations but occasionally words are mis-transcribed.      All pertinent Lab data and radiographic results reviewed with patient at bedside. The patient and/or the family were informed of the results of any tests/labs/imaging, the treatment plan, and time was allotted to answer questions. See chart for details of medications given during the ED stay.     The likelihood of other entities in the differential is insufficient to justify any further testing for them. This was explained to the patient. The patient was advised that persistent or worsening symptoms would require further evaluation.                Clinical Impression:  1. Fall from bed, initial encounter    2. Closed head injury, initial encounter    3. Acute midline low back pain without sciatica    4. Adrenal nodule ([facility] [address]    5. Non-recurrent unilateral inguinal hernia without obstruction or gangrene      Disposition referral (if applicable):  [place]o [place]  [address] [address]   [phone]    Schedule an appointment as soon as possible for a visit       Disposition medications (if applicable):  New Prescriptions    No medications on file           Remi Martins DO, FACEP      Comment: Please note this report has been produced using speech recognition software and maycontain errors related to that system including errors in grammar, punctuation, and spelling, as well as words and phrases that may be inappropriate. If there are any questions or concerns please feel free to contact thedictating provider for clarification.         Linn Harding DO  02/21/22 1100

## 2022-03-30 ENCOUNTER — HOSPITAL ENCOUNTER (OUTPATIENT)
Age: 82
Discharge: HOME | End: 2022-03-30
Payer: MEDICARE

## 2022-03-30 ENCOUNTER — HOSPITAL ENCOUNTER (OUTPATIENT)
Dept: MRI IMAGING | Age: 82
Discharge: HOME | End: 2022-03-30
Payer: MEDICARE

## 2022-03-30 DIAGNOSIS — E27.9 ADRENAL DISORDER (HCC): ICD-10-CM

## 2022-03-30 LAB
CORTISOL - AM: 9.5 UG/DL (ref 6–18.4)
FOLLICLE STIMULATING HORMONE: 7.5 MLU/ML
LH: 7.3 MIU/L

## 2022-03-30 PROCEDURE — 82024 ASSAY OF ACTH: CPT

## 2022-03-30 PROCEDURE — 83001 ASSAY OF GONADOTROPIN (FSH): CPT

## 2022-03-30 PROCEDURE — 82533 TOTAL CORTISOL: CPT

## 2022-03-30 PROCEDURE — 84403 ASSAY OF TOTAL TESTOSTERONE: CPT

## 2022-03-30 PROCEDURE — 74183 MRI ABD W/O CNTR FLWD CNTR: CPT

## 2022-03-30 PROCEDURE — 82384 ASSAY THREE CATECHOLAMINES: CPT

## 2022-03-30 PROCEDURE — 6360000004 HC RX CONTRAST MEDICATION: Performed by: INTERNAL MEDICINE

## 2022-03-30 PROCEDURE — 36415 COLL VENOUS BLD VENIPUNCTURE: CPT

## 2022-03-30 PROCEDURE — 82088 ASSAY OF ALDOSTERONE: CPT

## 2022-03-30 PROCEDURE — 82626 DEHYDROEPIANDROSTERONE: CPT

## 2022-03-30 PROCEDURE — 83002 ASSAY OF GONADOTROPIN (LH): CPT

## 2022-03-30 PROCEDURE — 84244 ASSAY OF RENIN: CPT

## 2022-03-30 PROCEDURE — A9577 INJ MULTIHANCE: HCPCS | Performed by: INTERNAL MEDICINE

## 2022-03-30 RX ADMIN — GADOBENATE DIMEGLUMINE 20 ML: 529 INJECTION, SOLUTION INTRAVENOUS at 13:31

## 2022-04-01 ENCOUNTER — TELEPHONE (OUTPATIENT)
Dept: PHARMACY | Age: 82
End: 2022-04-01

## 2022-04-01 ENCOUNTER — HOSPITAL ENCOUNTER (OUTPATIENT)
Age: 82
Setting detail: SPECIMEN
Discharge: HOME | End: 2022-04-01
Payer: MEDICARE

## 2022-04-01 LAB
BACTERIA: ABNORMAL /HPF
BILIRUBIN URINE: ABNORMAL MG/DL
BLOOD, URINE: ABNORMAL
CLARITY: ABNORMAL
COLOR: ABNORMAL
GLUCOSE, URINE: NEGATIVE MG/DL
KETONES, URINE: NEGATIVE MG/DL
LEUKOCYTE ESTERASE, URINE: NEGATIVE
MUCUS: ABNORMAL HPF
NITRITE URINE, QUANTITATIVE: POSITIVE
PH, URINE: 5.5 (ref 5–8)
PROTEIN UA: 100 MG/DL
RBC URINE: 3605 /HPF (ref 0–3)
SPECIFIC GRAVITY UA: 1.02 (ref 1–1.03)
TRICHOMONAS: ABNORMAL /HPF
UROBILINOGEN, URINE: NORMAL MG/DL (ref 0.2–1)
WBC UA: ABNORMAL /HPF (ref 0–2)
YEAST: ABNORMAL /HPF

## 2022-04-01 PROCEDURE — 81001 URINALYSIS AUTO W/SCOPE: CPT

## 2022-04-01 PROCEDURE — 87186 SC STD MICRODIL/AGAR DIL: CPT

## 2022-04-01 PROCEDURE — 87088 URINE BACTERIA CULTURE: CPT

## 2022-04-01 PROCEDURE — 87086 URINE CULTURE/COLONY COUNT: CPT

## 2022-04-01 NOTE — TELEPHONE ENCOUNTER
Angel Fontenot called back. Patient states INR was taken via fingerstick at office visit today and was 1.7. Angel Fontenot requested further directions. Contacted Dr. Kavon Florence office and spoke to Barbara Kinsey. She states due to blood in the urine provider would like patient to hold warfarin x 3 days despite subtherapeutic INR. Provider feels benefit outweighs risk. Urine sent for culture and patient started on Cephalexin. Patient has follow up office visit on Monday 4/4 where INR will be rechecked. Results to be called to clinic for further management. Provided office with on call number. Called patient's wife Angel Fontenot and patient to notify of the above. Voiced understanding and able to repeat back instructions.     For Pharmacy Admin Tracking Only     Time Spent (min): 20

## 2022-04-01 NOTE — TELEPHONE ENCOUNTER
Patient's wife Jayson Ram contacted clinic. Patient has blood in his urine today. Scheduled for INR check today.     For Pharmacy Admin Tracking Only     Time Spent (min): 5

## 2022-04-02 LAB — TESTOSTERONE TOTAL-MALE: 501 NG/DL (ref 300–720)

## 2022-04-04 ENCOUNTER — ANTI-COAG VISIT (OUTPATIENT)
Dept: PHARMACY | Age: 82
End: 2022-04-04
Payer: MEDICARE

## 2022-04-04 LAB
ADRENOCORTICOTROPIC HORMONE: 24.9 PG/ML (ref 7.2–63.3)
ALDOSTERONE: 10.4 NG/DL
CULTURE: ABNORMAL
CULTURE: ABNORMAL
INR BLD: 1.3
Lab: ABNORMAL
SPECIMEN: ABNORMAL

## 2022-04-04 NOTE — PROGRESS NOTES
Medication Management Service  [address] [phone]    Visit Date: 4/4/2022   Subjective: This is a telephone visit due to COVID-19. Maryann Shipman is a 80 y.o. male who is having INR checked by PCP office. INR results were sent to the clinic for anticoagulation monitoring and adjustment. Patient results called in to clinic for warfarin management due to  Indication:   atrial fibrillation. INR goal: of 2.0-3.0. Duration of therapy: indefinite. Patient reports the following:   Adherent with regimen  Missed or extra doses:  Warfarin held since 3/31  Bleeding or thromboembolic side effects:  None  Significant medication changes:  Keflex changed to Macrobid  Significant dietary changes: None  Significant alcohol or tobacco changes: None  Significant recent illness, disease state changes, or hospitalization:  None  Upcoming surgeries or procedures:  None  Falls: None           Assessment and Plan     PT/INR performed by PCP office. INR today is 1.3, subtherapeutic, due to held doses per PCP. Per Maya Ureña, no blood in urine for last 24 hours. Keflex switched to Macrobid, which does not interact with warfarin. PCP instructed patient to start back on warfarin today. Spoke to patient. Patient was instructed at office of PCP to start back on warfarin tonight. Plan: Will continue current regimen of warfarin 2.5mg daily except 3.5mg on Mondays and Fridays. Recheck INR in 1 week(s). Patient has standing lab orders for PT/INR. Patient verbalized understanding of dosing directions and information discussed. Progress note sent to referring office. Patient acknowledges working in consult agreement with pharmacist as referred by his/her physician.      Electronically signed by Rubina Hubbard [facility] on 4/4/22 at 12:07 PM EDT    For Pharmacy Admin Tracking Only     Intervention Detail:    Total # of Interventions Recommended:  Total # of Interventions Accepted:    Time Spent (min): 15

## 2022-04-05 ENCOUNTER — APPOINTMENT (OUTPATIENT)
Dept: PHARMACY | Age: 82
End: 2022-04-05
Payer: MEDICARE

## 2022-04-06 LAB
CATE PF EN INTRP: ABNORMAL
EPINEPHRINE PLASMA: 78 PG/ML (ref 10–200)
NOREPINEPHRINE: 1154 PG/ML (ref 80–520)
RENIN PLASMA: 1.3 NG/ML/HR

## 2022-04-08 LAB — DHEA: 0.74 NG/ML (ref 0.63–4.7)

## 2022-04-12 ENCOUNTER — ANTI-COAG VISIT (OUTPATIENT)
Dept: PHARMACY | Age: 82
End: 2022-04-12
Payer: MEDICARE

## 2022-04-12 LAB
INR BLD: 1.8
POC INR: 1.8 INDEX
PROTHROMBIN TIME, POC: 21.1 SECONDS (ref 10–14.3)
PROTIME: 21.1 SECONDS

## 2022-04-12 PROCEDURE — 36416 COLLJ CAPILLARY BLOOD SPEC: CPT

## 2022-04-12 PROCEDURE — 85610 PROTHROMBIN TIME: CPT

## 2022-04-12 PROCEDURE — 99212 OFFICE O/P EST SF 10 MIN: CPT

## 2022-04-12 NOTE — PROGRESS NOTES
Medication Management Service  [facility]  [phone]    Visit Date: 4/12/2022   Subjective:       Dwight Gomez is a 80 y.o. male who presents to clinic today for anticoagulation monitoring and adjustment. Patient seen in clinic for warfarin management due to  Indication:   atrial fibrillation. INR goal: of 2.0-3.0. Duration of therapy: indefinite. Patient reports the following:   Adherent with regimen  Missed or extra doses:  None   Bleeding or thromboembolic side effects:  None  Significant medication changes:  None  Significant dietary changes: None  Significant alcohol or tobacco changes: None  Significant recent illness, disease state changes, or hospitalization:  None  Upcoming surgeries or procedures:  None  Falls: Fall on 2/21 - denies hitting head - evaluated in [facility] ER           Assessment and Plan     PT/INR done in office per protocol. INR today is 1.8, slightly below goal range. Plan:  Take warfarin 3.5mg x 1 then will continue current regimen of warfarin 2.5mg daily except 3.5mg Mondays and Fridays. Recheck INR in 2 week(s). Patient verbalized understanding of dosing directions and information discussed. Dosing schedule given to patient including phone number, appointment date, and time. Progress note sent to referring office. Patient acknowledges working in consult agreement with pharmacist as referred by his/her physician.       Electronically signed by KAROLINE Quintero [facility] on 4/12/22 at 1:15 PM EDT    For Pharmacy Admin Tracking Only     Intervention Detail: Dose Adjustment: 1, reason: Therapy Optimization   Total # of Interventions Recommended: 1   Total # of Interventions Accepted: 1   Time Spent (min): 15

## 2022-04-19 ENCOUNTER — HOSPITAL ENCOUNTER (OUTPATIENT)
Age: 82
Discharge: HOME | End: 2022-04-19
Payer: MEDICARE

## 2022-04-19 LAB
BACTERIA: NEGATIVE /HPF
BILIRUBIN URINE: NEGATIVE MG/DL
BLOOD, URINE: ABNORMAL
CAST TYPE: ABNORMAL /HPF
CLARITY: CLEAR
COLOR: YELLOW
CRYSTAL TYPE: NEGATIVE /HPF
EPITHELIAL CELLS, UA: NEGATIVE /HPF
GLUCOSE, URINE: NEGATIVE MG/DL
KETONES, URINE: NEGATIVE MG/DL
LEUKOCYTE ESTERASE, URINE: NEGATIVE
NITRITE URINE, QUANTITATIVE: NEGATIVE
PH, URINE: 5 (ref 5–8)
PROTEIN UA: NEGATIVE MG/DL
RBC URINE: ABNORMAL /HPF (ref 0–3)
SPECIFIC GRAVITY UA: 1.02 (ref 1–1.03)
UROBILINOGEN, URINE: 0.2 MG/DL (ref 0.2–1)
WBC UA: NEGATIVE /HPF (ref 0–2)

## 2022-04-19 PROCEDURE — 87086 URINE CULTURE/COLONY COUNT: CPT

## 2022-04-19 PROCEDURE — 81001 URINALYSIS AUTO W/SCOPE: CPT

## 2022-04-20 LAB
CULTURE: NORMAL
Lab: NORMAL
SPECIMEN: NORMAL

## 2022-04-26 ENCOUNTER — ANTI-COAG VISIT (OUTPATIENT)
Dept: PHARMACY | Age: 82
End: 2022-04-26
Payer: MEDICARE

## 2022-04-26 PROCEDURE — 85610 PROTHROMBIN TIME: CPT

## 2022-04-26 PROCEDURE — 36416 COLLJ CAPILLARY BLOOD SPEC: CPT

## 2022-04-26 PROCEDURE — 99211 OFF/OP EST MAY X REQ PHY/QHP: CPT

## 2022-04-26 NOTE — PROGRESS NOTES
Medication Management Service  [facility]  [phone]    Visit Date: 4/26/2022   Subjective:       Glen Patiño is a 80 y.o. male who presents to clinic today for anticoagulation monitoring and adjustment. Patient seen in clinic for warfarin management due to  Indication:   atrial fibrillation. INR goal: of 2.0-3.0. Duration of therapy: indefinite. Patient reports the following:   Adherent with regimen  Missed or extra doses:  None   Bleeding or thromboembolic side effects:  5/06 - urine test had 10% blood per Dr. Byron Avila - patient scheduled to follow up in 3 weeks  Significant medication changes:  Started oxybutynin 5mg tablet every other day started 4/22 - does not interact with warfarin  Significant dietary changes: None  Significant alcohol or tobacco changes: None  Significant recent illness, disease state changes, or hospitalization:  None  Upcoming surgeries or procedures:  None  Falls: None           Assessment and Plan     PT/INR done in office per protocol. INR today is 2.8, therapeutic. Plan: Will continue current regimen of warfarin 2.5mg daily except 3.5mg Mondays and Fridays. Recheck INR in 4 week(s). Patient verbalized understanding of dosing directions and information discussed. Dosing schedule given to patient including phone number, appointment date, and time. Progress note sent to referring office. Patient acknowledges working in consult agreement with pharmacist as referred by his/her physician.       Electronically signed by KAROLINE Naqvi [facility] on 4/26/22 at 3:24 PM EDT    For Pharmacy Admin Tracking Only     Total # of Interventions Recommended: 0   Total # of Interventions Accepted: 0   Time Spent (min): 15

## 2022-05-24 ENCOUNTER — ANTI-COAG VISIT (OUTPATIENT)
Dept: PHARMACY | Age: 82
End: 2022-05-24
Payer: MEDICARE

## 2022-05-24 LAB
INR BLD: 2.6
POC INR: 2.6 INDEX
PROTHROMBIN TIME, POC: 31.7 SECONDS (ref 10–14.3)
PROTIME: 31.7 SECONDS

## 2022-05-24 PROCEDURE — 99211 OFF/OP EST MAY X REQ PHY/QHP: CPT

## 2022-05-24 PROCEDURE — 85610 PROTHROMBIN TIME: CPT

## 2022-05-24 PROCEDURE — 36416 COLLJ CAPILLARY BLOOD SPEC: CPT

## 2022-05-24 NOTE — PROGRESS NOTES
Medication Management Service  [facility]  [phone]    Visit Date: 5/24/2022   Subjective:       Luis Alberto Thompson is a 80 y.o. male who presents to clinic today for anticoagulation monitoring and adjustment. Patient seen in clinic for warfarin management due to  Indication:   atrial fibrillation. INR goal: of 2.0-3.0. Duration of therapy: indefinite. Patient reports the following:   Adherent with regimen  Missed or extra doses:  None   Bleeding or thromboembolic side effects:  None  Significant medication changes:  None  Significant dietary changes: None  Significant alcohol or tobacco changes: None  Significant recent illness, disease state changes, or hospitalization:  None  Upcoming surgeries or procedures:  None  Falls: None           Assessment and Plan     PT/INR done in office per protocol. INR today is 2.6, therapeutic. Plan: Will continue current regimen of warfarin 2.5mg daily except 3.5mg Mondays and Fridays. Recheck INR in 6 week(s). Patient verbalized understanding of dosing directions and information discussed. Dosing schedule given to patient including phone number, appointment date, and time. Progress note sent to referring office. Patient acknowledges working in consult agreement with pharmacist as referred by his/her physician.       Electronically signed by KAROLINE Martins [facility] on 5/24/22 at 10:04 AM EDT    For Pharmacy Admin Tracking Only     Total # of Interventions Recommended: 0   Total # of Interventions Accepted: 0   Time Spent (min): 15

## 2022-06-06 ENCOUNTER — HOSPITAL ENCOUNTER (OUTPATIENT)
Age: 82
Discharge: HOME | End: 2022-06-06
Payer: MEDICARE

## 2022-06-06 LAB
ALBUMIN SERPL-MCNC: 4.4 GM/DL (ref 3.4–5)
ALP BLD-CCNC: 91 IU/L (ref 40–129)
ALT SERPL-CCNC: 18 U/L (ref 10–40)
ANION GAP SERPL CALCULATED.3IONS-SCNC: 15 MMOL/L (ref 4–16)
AST SERPL-CCNC: 6 IU/L (ref 8–33)
BASOPHILS ABSOLUTE: 0.1 K/CU MM
BASOPHILS RELATIVE PERCENT: 0.9 % (ref 0–1)
BILIRUB SERPL-MCNC: 0.7 MG/DL (ref 0–1)
BUN BLDV-MCNC: 21 MG/DL (ref 6–23)
CALCIUM SERPL-MCNC: 9.5 MG/DL (ref 8.3–10.6)
CHLORIDE BLD-SCNC: 104 MMOL/L (ref 99–110)
CHOLESTEROL, FASTING: 128 MG/DL
CO2: 22 MMOL/L (ref 21–32)
CREAT SERPL-MCNC: 1.4 MG/DL (ref 0.9–1.3)
CREATININE URINE: 201.4 MG/DL (ref 39–259)
DIFFERENTIAL TYPE: ABNORMAL
EOSINOPHILS ABSOLUTE: 0.2 K/CU MM
EOSINOPHILS RELATIVE PERCENT: 3 % (ref 0–3)
ESTIMATED AVERAGE GLUCOSE: 134 MG/DL
GFR AFRICAN AMERICAN: 59 ML/MIN/1.73M2
GFR NON-AFRICAN AMERICAN: 49 ML/MIN/1.73M2
GLUCOSE FASTING: 89 MG/DL (ref 70–99)
HBA1C MFR BLD: 6.3 % (ref 4.2–6.3)
HCT VFR BLD CALC: 47.1 % (ref 42–52)
HDLC SERPL-MCNC: 35 MG/DL
HEMOGLOBIN: 15.6 GM/DL (ref 13.5–18)
IMMATURE NEUTROPHIL %: 0.1 % (ref 0–0.43)
LDL CHOLESTEROL CALCULATED: 75 MG/DL
LYMPHOCYTES ABSOLUTE: 2.3 K/CU MM
LYMPHOCYTES RELATIVE PERCENT: 30.5 % (ref 24–44)
MCH RBC QN AUTO: 33.3 PG (ref 27–31)
MCHC RBC AUTO-ENTMCNC: 33.1 % (ref 32–36)
MCV RBC AUTO: 100.4 FL (ref 78–100)
MICROALBUMIN/CREAT 24H UR: 10.6 MG/DL
MICROALBUMIN/CREAT UR-RTO: 52.6 MG/G CREAT (ref 0–30)
MONOCYTES ABSOLUTE: 0.8 K/CU MM
MONOCYTES RELATIVE PERCENT: 10.5 % (ref 0–4)
PDW BLD-RTO: 13 % (ref 11.7–14.9)
PLATELET # BLD: 192 K/CU MM (ref 140–440)
PMV BLD AUTO: 9.2 FL (ref 7.5–11.1)
POTASSIUM SERPL-SCNC: 4.1 MMOL/L (ref 3.5–5.1)
RBC # BLD: 4.69 M/CU MM (ref 4.6–6.2)
SEGMENTED NEUTROPHILS ABSOLUTE COUNT: 4.2 K/CU MM
SEGMENTED NEUTROPHILS RELATIVE PERCENT: 55 % (ref 36–66)
SODIUM BLD-SCNC: 141 MMOL/L (ref 135–145)
TOTAL CK: 179 IU/L (ref 38–174)
TOTAL IMMATURE NEUTOROPHIL: 0.01 K/CU MM
TOTAL PROTEIN: 6.9 GM/DL (ref 6.4–8.2)
TRIGLYCERIDE, FASTING: 90 MG/DL
WBC # BLD: 7.7 K/CU MM (ref 4–10.5)

## 2022-06-06 PROCEDURE — 85025 COMPLETE CBC W/AUTO DIFF WBC: CPT

## 2022-06-06 PROCEDURE — 82550 ASSAY OF CK (CPK): CPT

## 2022-06-06 PROCEDURE — 80053 COMPREHEN METABOLIC PANEL: CPT

## 2022-06-06 PROCEDURE — 83036 HEMOGLOBIN GLYCOSYLATED A1C: CPT

## 2022-06-06 PROCEDURE — 36415 COLL VENOUS BLD VENIPUNCTURE: CPT

## 2022-06-06 PROCEDURE — 82043 UR ALBUMIN QUANTITATIVE: CPT

## 2022-06-06 PROCEDURE — 82570 ASSAY OF URINE CREATININE: CPT

## 2022-06-06 PROCEDURE — 80061 LIPID PANEL: CPT

## 2022-07-05 ENCOUNTER — ANTI-COAG VISIT (OUTPATIENT)
Dept: PHARMACY | Age: 82
End: 2022-07-05
Payer: MEDICARE

## 2022-07-05 LAB
INR BLD: 2.8
POC INR: 2.8 INDEX
PROTHROMBIN TIME, POC: 33.2 SECONDS (ref 10–14.3)
PROTIME: 33.2 SECONDS

## 2022-07-05 PROCEDURE — 36416 COLLJ CAPILLARY BLOOD SPEC: CPT

## 2022-07-05 PROCEDURE — 85610 PROTHROMBIN TIME: CPT

## 2022-07-05 PROCEDURE — 99212 OFFICE O/P EST SF 10 MIN: CPT

## 2022-07-05 RX ORDER — WARFARIN SODIUM 2.5 MG/1
TABLET ORAL
Qty: 90 TABLET | Refills: 1 | Status: SHIPPED | OUTPATIENT
Start: 2022-07-05

## 2022-07-05 RX ORDER — WARFARIN SODIUM 1 MG/1
TABLET ORAL
Qty: 24 TABLET | Refills: 1 | Status: SHIPPED | OUTPATIENT
Start: 2022-07-05

## 2022-07-05 NOTE — PROGRESS NOTES
Medication Management Service  [facility]  [phone]    Visit Date: 7/5/2022   Subjective:       Lexi Greer is a 80 y.o. male who presents to clinic today for anticoagulation monitoring and adjustment. Patient seen in clinic for warfarin management due to  Indication:   atrial fibrillation. INR goal: of 2.0-3.0. Duration of therapy: indefinite. Patient reports the following:   Adherent with regimen  Missed or extra doses:  None   Bleeding or thromboembolic side effects:  None  Significant medication changes:  None  Significant dietary changes: None  Significant alcohol or tobacco changes: None  Significant recent illness, disease state changes, or hospitalization:  None  Upcoming surgeries or procedures:  None  Falls: None           Assessment and Plan     PT/INR done in office per protocol. INR today is 2.8, therapeutic. Plan: Will continue current regimen of warfarin 2.5mg daily except 3.5mg Mondays and Fridays. Recheck INR in 12 week(s). E-Rx sent to [facility] for warfarin 1mg tablets #24 with 1 refill and 2.5mg tablets #90 with 1 refill    Patient verbalized understanding of dosing directions and information discussed. Dosing schedule given to patient including phone number, appointment date, and time. Progress note sent to referring office. Patient acknowledges working in consult agreement with pharmacist as referred by his/her physician.       Electronically signed by Ana Mejia [facility] on 7/5/22 at 3:24 PM EDT    For Pharmacy Admin Tracking Only     Intervention Detail: Refill(s) Provided   Total # of Interventions Recommended: 2   Total # of Interventions Accepted: 2   Time Spent (min): 15

## 2022-08-03 DIAGNOSIS — I48.91 ATRIAL FIBRILLATION, UNSPECIFIED TYPE (HCC): Primary | ICD-10-CM

## 2022-08-09 ENCOUNTER — TELEPHONE (OUTPATIENT)
Dept: PHARMACY | Age: 82
End: 2022-08-09

## 2022-08-09 NOTE — TELEPHONE ENCOUNTER
Patient left  yesterday afternoon requesting warfarin 2.5mg tablet refill to be sent to [facility]. Refill sent 7/5/22. [facility] to fill. Notified patient of the above.     For Pharmacy Admin Tracking Only    Time Spent (min): 5

## 2022-09-27 ENCOUNTER — ANTI-COAG VISIT (OUTPATIENT)
Dept: PHARMACY | Age: 82
End: 2022-09-27
Payer: MEDICARE

## 2022-09-27 LAB
INR BLD: 2.5
POC INR: 2.5 INDEX
PROTHROMBIN TIME, POC: 30.1 SECONDS (ref 10–14.3)
PROTIME: 30.1 SECONDS

## 2022-09-27 PROCEDURE — 85610 PROTHROMBIN TIME: CPT

## 2022-09-27 PROCEDURE — 36416 COLLJ CAPILLARY BLOOD SPEC: CPT

## 2022-09-27 PROCEDURE — 99211 OFF/OP EST MAY X REQ PHY/QHP: CPT

## 2022-09-27 NOTE — PROGRESS NOTES
Medication Management Service  [facility]  [phone]    Visit Date: 9/27/2022   Subjective:       Cody Jordan is a 80 y.o. male who presents to clinic today for anticoagulation monitoring and adjustment. Patient seen in clinic for warfarin management due to  Indication:   atrial fibrillation. INR goal: of 2.0-3.0. Duration of therapy: indefinite. Patient reports the following:   Adherent with regimen  Missed or extra doses:  None   Bleeding or thromboembolic side effects:  None  Significant medication changes:  None  Significant dietary changes: None  Significant alcohol or tobacco changes: None  Significant recent illness, disease state changes, or hospitalization:  None  Upcoming surgeries or procedures:  None  Falls: None           Assessment and Plan     PT/INR done in office per protocol. INR today is 2.5, therapeutic. Plan: Will continue current regimen of warfarin 2.5mg daily except 3.5mg Mondays and Fridays. Recheck INR in 12 week(s). Patient verbalized understanding of dosing directions and information discussed. Dosing schedule given to patient including phone number, appointment date, and time. Progress note sent to referring office. Patient acknowledges working in consult agreement with pharmacist as referred by his/her physician.       Electronically signed by Loly Langston [facility] on 9/27/22 at 1:52 PM EDT    For Pharmacy Admin Tracking Only    Total # of Interventions Recommended: 0  Total # of Interventions Accepted: 0  Time Spent (min): 15

## 2022-12-17 ENCOUNTER — HOSPITAL ENCOUNTER (EMERGENCY)
Age: 82
Discharge: HOME | End: 2022-12-18
Attending: EMERGENCY MEDICINE
Payer: MEDICARE

## 2022-12-17 DIAGNOSIS — M79.10 MYALGIA: ICD-10-CM

## 2022-12-17 DIAGNOSIS — U07.1 COVID-19: Primary | ICD-10-CM

## 2022-12-17 PROCEDURE — 87635 SARS-COV-2 COVID-19 AMP PRB: CPT

## 2022-12-17 PROCEDURE — 87804 INFLUENZA ASSAY W/OPTIC: CPT

## 2022-12-17 PROCEDURE — 99283 EMERGENCY DEPT VISIT LOW MDM: CPT

## 2022-12-17 RX ORDER — ACETAMINOPHEN 500 MG
1000 TABLET ORAL ONCE
Status: COMPLETED | OUTPATIENT
Start: 2022-12-18 | End: 2022-12-18

## 2022-12-18 VITALS
WEIGHT: 210 LBS | HEIGHT: 71 IN | SYSTOLIC BLOOD PRESSURE: 134 MMHG | DIASTOLIC BLOOD PRESSURE: 72 MMHG | RESPIRATION RATE: 20 BRPM | TEMPERATURE: 99.8 F | OXYGEN SATURATION: 95 % | HEART RATE: 100 BPM | BODY MASS INDEX: 29.4 KG/M2

## 2022-12-18 LAB
RAPID INFLUENZA  B AGN: NEGATIVE
RAPID INFLUENZA A AGN: NEGATIVE
SARS-COV-2, NAAT: DETECTED
SOURCE: ABNORMAL

## 2022-12-18 PROCEDURE — 6370000000 HC RX 637 (ALT 250 FOR IP): Performed by: EMERGENCY MEDICINE

## 2022-12-18 RX ORDER — NIRMATRELVIR AND RITONAVIR 300-100 MG
KIT ORAL
Qty: 30 TABLET | Refills: 0 | Status: SHIPPED | OUTPATIENT
Start: 2022-12-18 | End: 2022-12-23

## 2022-12-18 RX ADMIN — ACETAMINOPHEN 1000 MG: 500 TABLET ORAL at 00:05

## 2022-12-18 NOTE — ED NOTES
Patient presents to the ED with complaint of increased \" stiffness\" in his back, and leg weakness,  Patient ambulated to room stooped over, utilizing his wife's cane. Upon evaluation patient was found to have a fever 102.6, and tachycardia 128. Patient denies any flu like symptoms. Family is at bedside.        France Mehta RN  12/18/22 0025       France Mehta RN  12/18/22 0028

## 2022-12-18 NOTE — DISCHARGE INSTRUCTIONS
You can take 1000 mg of Tylenol every 8 hours as needed for your fevers and muscle aches. Please have your INR level checked within the next 1 week.

## 2022-12-18 NOTE — ED PROVIDER NOTES
CHIEF COMPLAINT    Chief Complaint   Patient presents with    Back Pain     Pt reports stiff back     HPI  Cristina Mckinney is a 80 y.o. male with history of atrial fibrillation, hyperlipidemia, chronic anticoagulation who presents to the ED accompanied by family complaining predominantly of a \"stiffness\" in his back that started today. Patient states that throughout the day he began to experience some stiffness in the back which he describes as starting in his shoulders and radiate all the way to lower back to the waist.  This pain has been more difficult for him to walk caused him to be hunched over and also somewhat tilted to the side as he walks. Patient does have history of previous spinal stenosis with back pain but states this pain feels different and it is located diffusely throughout the back and not the midline of the back. Symptoms are exacerbated with movements and positions. Nothing seems to make it much better. He has asked been using his wife's cane today to help walk. Interim he does not use an assistive device for walking. Denies fevers, chills, congestion, cough, shortness of breath, nausea, vomiting, chest pain      REVIEW OF SYSTEMS  Constitutional: No fever, chills  Eye: No visual changes  HENT: No earache or sore throat. Resp: No SOB or productive cough. Cardio: No chest pain or palpitations. GI: No abdominal pain, nausea, vomiting, constipation or diarrhea. No melena. : No dysuria, urgency or frequency. Endocrine: No heat intolerance, no cold intolerance, no polydipsia   Lymphatics: No adenopathy  Musculoskeletal: Complains of stiffness in the back  Neuro: No headaches. Psych: No homicidal or suicidal thoughts  Skin: No rash, No itching. ?  ? PAST MEDICAL HISTORY  Past Medical History:   Diagnosis Date    Atrial flutter ([address]     H/O Doppler ultrasound 10/26/15    Peripheral vascular.      H/O echocardiogram 2/7/12    Mild concentric LVH, mild RV and aortic root dilatation, mild tricuspid regurg. w/pulmonary HTN, EF 50-55%. H/O myocardial perfusion scan 12    Lexiscan Thallium. Normal perfusion, EF 44%. Patient is in atrial flutter w/rapid rate. Hypertension     Varicose veins 2015    Both lower extremities left>right     Venous insufficiency 2017     FAMILY HISTORY  History reviewed. No pertinent family history.   SOCIAL HISTORY  Social History     Socioeconomic History    Marital status:      Spouse name: None    Number of children: None    Years of education: None    Highest education level: None   Occupational History    Occupation: Retired   Tobacco Use    Smoking status: Former     Years: 40.00     Types: Cigarettes     Quit date: 2000     Years since quittin.9    Smokeless tobacco: Never    Tobacco comments:     smoked pipe   Vaping Use    Vaping Use: Never used   Substance and Sexual Activity    Alcohol use: No    Drug use: No    Sexual activity: Yes     Partners: Female     Comment: marriied       SURGICAL HISTORY  Past Surgical History:   Procedure Laterality Date    CARDIOVERSION  3/6/2012         CATARACT REMOVAL      right eye    CHOLECYSTECTOMY      COLONOSCOPY N/A 2020    COLONOSCOPY POLYPECTOMY ABLATION performed by Usman Mckee MD at [facility] CRYOABLATION  2016    TOOTH EXTRACTION       CURRENT MEDICATIONS  Previous Medications    ACETAMINOPHEN (ARTHRITIS PAIN) 650 MG EXTENDED RELEASE TABLET    Take 650 mg by mouth every 8 hours as needed for Pain    ATORVASTATIN (LIPITOR) 10 MG TABLET    Take 10 mg by mouth daily    CETIRIZINE (ZYRTEC) 10 MG TABLET    Take 10 mg by mouth daily as needed for Allergies    HYDROCHLOROTHIAZIDE (HYDRODIURIL) 25 MG TABLET    Take 25 mg by mouth daily    METOPROLOL (LOPRESSOR) 50 MG TABLET    Take 1 tablet by mouth 2 times daily    OLMESARTAN (BENICAR) 5 MG TABLET    Take 5 mg by mouth 2 times daily    OXYBUTYNIN (DITROPAN-XL) 5 MG EXTENDED RELEASE TABLET    Take 5 mg by mouth every evening    WARFARIN (COUMADIN) 1 MG TABLET    Take 2.5mg daily except 3.5mg Mondays and Fridays or as directed by clinic    WARFARIN (COUMADIN) 2.5 MG TABLET    Take 2.5mg daily except 3.5mg Mondays and Fridays or as directed by clinic     ALLERGIES  Allergies   Allergen Reactions    Latex Rash    Dicyclomine     Neosporin [Bacitracin-Neomycin-Polymyxin] Rash       Nursing notes reviewed by myself for past medical history, family history, social history, surgical history, current medications, and allergies. PHYSICAL EXAM  VITAL SIGNS: Triage VS:    ED Triage Vitals   Enc Vitals Group      BP 12/17/22 2341 (!) 158/94      Heart Rate 12/17/22 2341 (!) 128      Resp 12/17/22 2341 20      Temp 12/17/22 2341 (!) 102.6 °F (39.2 °C)      Temp Source 12/17/22 2341 Oral      SpO2 12/17/22 2341 93 %      Weight 12/17/22 2346 210 lb (95.3 kg)      Height 12/17/22 2346 5' 11\" (1.803 m)      Head Circumference --       Peak Flow --       Pain Score --       Pain Loc --       Pain Edu? --       Excl. in [address]? --      Constitutional: Well developed, Well nourished, nontoxic appearing  HENT: Normocephalic, Atraumatic, Bilateral external ears normal, Oropharynx moist, No oral exudates, Nose normal.   Eyes: PERRL, EOMI, Conjunctiva normal, No discharge. No scleral icterus. Neck: Normal range of motion, No tenderness, Supple. No meningismus  Lymphatic: No lymphadenopathy noted. Cardiovascular: Tachycardic, Normal rhythm, No murmurs, gallops or rubs. Thorax & Lungs: Normal breath sounds, No respiratory distress, No wheezing. Abdomen: Soft, No tenderness, No masses, No pulsatile masses, No distention, Normal bowel sounds  Skin: Warm, Dry, Pink, No mottling, No erythema, No rash. Back: No midline tenderness, tenderness palpation of parathoracic and paralumbar musculature. Extremities: No edema, No tenderness, No cyanosis, Normal perfusion, No clubbing. Musculoskeletal: Good range of motion in all major joints as observed. No major deformities noted. Neurologic: Alert & oriented x 3, Normal motor function, Normal sensory function, CN II-XII grossly intact as tested, No focal deficits noted. Psychiatric: Affect normal, Judgment normal, Mood normal.   EKG  [unfilled]  RADIOLOGY  Labs Reviewed   COVID-19, RAPID - Abnormal; Notable for the following components:       Result Value    SARS-CoV-2, NAAT DETECTED (*)     All other components within normal limits   RAPID INFLUENZA A/B ANTIGENS     I personally reviewed the images. The radiologist's interpretation reveals:  Last Imaging results   No orders to display       MEDS GIVEN IN ED:  Medications   acetaminophen (TYLENOL) tablet 1,000 mg (1,000 mg Oral Given 12/18/22 0005)     COURSE & MEDICAL DECISION MAKING  63-year-old male presents the emergency department accompanied by for members complaining of stiffness in the back making it difficult for him to walk today. His initial vital signs show fever with temperature of 102.6 °F and tachycardic rate of 128. He is saturating at 93% above on room air. Patient is nontoxic-appearing on exam.  He has tachycardia. Lungs are clear to auscultation. Abdomen is soft and nontender. He has some tenderness palpation throughout the parathoracic and paralumbar musculature with no midline tenderness. No meningismus. I had discussion with family at bedside about full sepsis work-up versus swabbing for COVID and influenza provide the patient with Tylenol and reassessing him. They have opted for COVID and influenza swabs and reevaluation following Tylenol therapy to see how he subjectively feels and how he is able to walk. Patient's influenza testing is negative. COVID-19 testing is positive.   The patient's tachycardia improved to heart rate of 102 following Tylenol ministration and fever improved to 100.0 °F.  He was able to walk here and states that his stiffness in the back feels much improved. He feels comfortable with returning home. While ambulating he had no evidence of ox desaturation with monitoring. At this time he will be discharged home with a prescription for Paxlovid. Return precautions provided        Amount and/or Complexity of Data Reviewed  Clinical lab tests: reviewed  Decide to obtain previous medical records or to obtain history from someone other than the patient: yes       -  Patient seen and evaluated in the emergency department. -  Triage and nursing notes reviewed and incorporated. -  Old chart records reviewed and incorporated. -  Work-up included:  See above  -  Results discussed with patient. Appropriate PPE utilized as indicated for entire patient encounter? Time of Disposition: See timeline  ? New Prescriptions    NIRMATRELVIR/RITONAVIR (PAXLOVID, 300/100,) 20 X 150 MG & 10 X 100MG TBPK    Take 3 tablets (two 150 mg nirmatrelvir and one 100 mg ritonavir tablets) by mouth every 12 hours for 5 days. FINAL IMPRESSION  1. COVID-19    2. Myalgia        Electronically signed by:  Harris Yusuf DO, 12/18/2022         Harris Yusuf DO  12/18/22 6303

## 2022-12-18 NOTE — ED NOTES
Patient ambulated in [place], patients O2 stayed between 93-95 on RA, Pt voiced that his pain and or stiffness was much better since having the tylenol. Perspiration noted on patients forehead, oral temp rechecked now 100.       Chris Lanier RN  12/18/22 4269

## 2022-12-18 NOTE — ED NOTES
Discharged with instructions and rx x 1. Pt acknowledges understanding. Ambulatory with can at discharge.       Shazia Krishnamurthy RN  12/18/22 7411

## 2022-12-27 ENCOUNTER — ANTI-COAG VISIT (OUTPATIENT)
Dept: PHARMACY | Age: 82
End: 2022-12-27
Payer: MEDICARE

## 2022-12-27 LAB
INR BLD: 2
POC INR: 2 INDEX
PROTHROMBIN TIME, POC: 24.3 SECONDS (ref 10–14.3)
PROTIME: 24.3 SECONDS

## 2022-12-27 PROCEDURE — 36416 COLLJ CAPILLARY BLOOD SPEC: CPT

## 2022-12-27 PROCEDURE — 99212 OFFICE O/P EST SF 10 MIN: CPT

## 2022-12-27 PROCEDURE — 85610 PROTHROMBIN TIME: CPT

## 2022-12-27 RX ORDER — WARFARIN SODIUM 1 MG/1
TABLET ORAL
Qty: 24 TABLET | Refills: 1 | Status: SHIPPED | OUTPATIENT
Start: 2022-12-27

## 2022-12-27 NOTE — PROGRESS NOTES
Medication Management Service  [facility]  [phone]    Visit Date: 12/27/2022   Subjective:       Deland Goldmann is a 80 y.o. male who presents to clinic today for anticoagulation monitoring and adjustment. Patient seen in clinic for warfarin management due to  Indication:   atrial fibrillation. INR goal: of 2.0-3.0. Duration of therapy: indefinite. Patient reports the following:   Adherent with regimen  Missed or extra doses:  None   Bleeding or thromboembolic side effects:  None  Significant medication changes:  Paxlovid and Tylenol - complete  Significant dietary changes: None  Significant alcohol or tobacco changes: None  Significant recent illness, disease state changes, or hospitalization:  ER 12/17 - back pain / [place]  Upcoming surgeries or procedures:  None  Falls: None           Assessment and Plan     PT/INR done in office per protocol. INR today is 2, therapeutic. Plan: Will continue current regimen of warfarin 2.5mg daily except 3.5mg Mondays and Fridays. Recheck INR in 12 week(s). E-Rx sent to [facility] for warfarin 1mg tablets #24 with 1 refill    Patient verbalized understanding of dosing directions and information discussed. Dosing schedule given to patient including phone number, appointment date, and time. Progress note sent to referring office. Patient acknowledges working in consult agreement with pharmacist as referred by his/her physician.       Electronically signed by Monserrat Byrd [facility] on 12/27/22 at 12:38 PM EST    For Pharmacy Admin Tracking Only    Intervention Detail: Refill(s) Provided  Total # of Interventions Recommended: 1  Total # of Interventions Accepted: 1  Time Spent (min): 15

## 2023-01-09 ENCOUNTER — OFFICE VISIT (OUTPATIENT)
Dept: CARDIOLOGY CLINIC | Age: 83
End: 2023-01-09
Payer: MEDICARE

## 2023-01-09 VITALS
HEART RATE: 73 BPM | HEIGHT: 71 IN | DIASTOLIC BLOOD PRESSURE: 74 MMHG | BODY MASS INDEX: 31.33 KG/M2 | SYSTOLIC BLOOD PRESSURE: 126 MMHG | WEIGHT: 223.8 LBS

## 2023-01-09 DIAGNOSIS — E78.49 OTHER HYPERLIPIDEMIA: ICD-10-CM

## 2023-01-09 DIAGNOSIS — I10 PRIMARY HYPERTENSION: ICD-10-CM

## 2023-01-09 DIAGNOSIS — I48.91 ATRIAL FIBRILLATION, UNSPECIFIED TYPE (HCC): Primary | ICD-10-CM

## 2023-01-09 PROCEDURE — 99214 OFFICE O/P EST MOD 30 MIN: CPT | Performed by: INTERNAL MEDICINE

## 2023-01-09 PROCEDURE — 3078F DIAST BP <80 MM HG: CPT | Performed by: INTERNAL MEDICINE

## 2023-01-09 PROCEDURE — G8427 DOCREV CUR MEDS BY ELIG CLIN: HCPCS | Performed by: INTERNAL MEDICINE

## 2023-01-09 PROCEDURE — G8484 FLU IMMUNIZE NO ADMIN: HCPCS | Performed by: INTERNAL MEDICINE

## 2023-01-09 PROCEDURE — 1123F ACP DISCUSS/DSCN MKR DOCD: CPT | Performed by: INTERNAL MEDICINE

## 2023-01-09 PROCEDURE — 1036F TOBACCO NON-USER: CPT | Performed by: INTERNAL MEDICINE

## 2023-01-09 PROCEDURE — G8417 CALC BMI ABV UP PARAM F/U: HCPCS | Performed by: INTERNAL MEDICINE

## 2023-01-09 PROCEDURE — 93000 ELECTROCARDIOGRAM COMPLETE: CPT | Performed by: INTERNAL MEDICINE

## 2023-01-09 PROCEDURE — 3074F SYST BP LT 130 MM HG: CPT | Performed by: INTERNAL MEDICINE

## 2023-01-09 NOTE — ASSESSMENT & PLAN NOTE
Rate is well controlled and he is on warfarin for anticoagulation therapy with therapeutic INR and he follows up at Coumadin clinic for PT/INR regulation.

## 2023-01-09 NOTE — PROGRESS NOTES
Marko Abdi  1940  Mariangel Miller MD      Chief Complaint   Patient presents with    1 Year Follow Up     Pt denies cardiac symptoms    Other     Pt tested positive for Covid 3 weeks ago     Chief complaint and HPI:  Marko Abdi  is a 80 y.o. male following up for chronic atrial fibrillation and has hypertension and hyperlipidemia. He denies any cardiac symptoms. He is getting all his medications from PCP except Ditropan he gets from urology. He fell once in February last year. Denies dizziness syncope or near syncope. Denies any palpitations or chest pains or shortness of breath. He reports a stiff back for which he has been seen in emergency room and has received some physical therapy. Apparently his daughter works in physical therapy. Rest of the Cardiovascular system review is otherwise unchanged from prior encounter. Past medical history:  has a past medical history of Atrial flutter ([address] H/O Doppler ultrasound, H/O echocardiogram, H/O myocardial perfusion scan, Hypertension, Varicose veins, and Venous insufficiency. Past surgical history:  has a past surgical history that includes Cholecystectomy; hernia repair; Tooth Extraction; Cataract removal; Hemorrhoid surgery; Prostate Cryoablation (2016); cyst removal; Cardioversion (3/6/2012); and Colonoscopy (N/A, 2020). Social History:   Social History     Tobacco Use    Smoking status: Former     Years: 40.00     Types: Cigarettes     Quit date: 2000     Years since quittin.0    Smokeless tobacco: Never    Tobacco comments:     smoked pipe   Substance Use Topics    Alcohol use: No     Family history: family history is not on file. ALLERGIES:  Latex, Dicyclomine, and Neosporin [bacitracin-neomycin-polymyxin]  Prior to Admission medications    Medication Sig Start Date End Date Taking?  Authorizing Provider   warfarin (COUMADIN) 1 MG tablet Take 2.5mg by mouth daily except 3.5mg  and  or as directed by clinic 12/27/22  Yes Cayetano Felder MD   warfarin (COUMADIN) 2.5 MG tablet Take 2.5mg daily except 3.5mg Mondays and Fridays or as directed by clinic 7/5/22  Yes Cayetano Felder MD   atorvastatin (LIPITOR) 10 MG tablet Take 10 mg by mouth daily   Yes Historical Provider, MD   oxybutynin (DITROPAN-XL) 5 MG extended release tablet Take 5 mg by mouth every evening   Yes Historical Provider, MD   acetaminophen (TYLENOL) 650 MG extended release tablet Take 650 mg by mouth every 8 hours as needed for Pain   Yes Historical Provider, MD   olmesartan (BENICAR) 5 MG tablet Take 5 mg by mouth 2 times daily   Yes Historical Provider, MD   hydrochlorothiazide (HYDRODIURIL) 25 MG tablet Take 25 mg by mouth daily   Yes Historical Provider, MD   metoprolol (LOPRESSOR) 50 MG tablet Take 1 tablet by mouth 2 times daily 7/21/15  Yes Cayetano Felder MD   cetirizine (ZYRTEC) 10 MG tablet Take 10 mg by mouth daily as needed for Allergies  Patient not taking: Reported on 1/9/2023    Historical Provider, MD     Vitals:    01/09/23 1447   BP: 126/74   Site: Left Upper Arm   Position: Sitting   Cuff Size: Medium Adult   Pulse: 73   Weight: 223 lb 12.8 oz (101.5 kg)   Height: 5' 11\" (1.803 m)      Body mass index is 31.21 kg/m². Wt Readings from Last 3 Encounters:   01/09/23 223 lb 12.8 oz (101.5 kg)   12/17/22 210 lb (95.3 kg)   02/21/22 210 lb (95.3 kg)     Constitutional:  Patient is well-built moderately overweight male in no apparent distress. Eyes: Is wearing glasses. No conjunctival pallor noted. NECK: No JVP or thyromegaly  Cardiovascular: Auscultation: Normal S1 and S2. No significant murmurs or gallops noted. Carotids negative bruits. Dona Mitchell Respiratory:  Respiratory effort is normal. Breath sounds are clear to auscultation. Extremities:  No edema, clubbing,  Cyanosis, petechiae. SKIN: Warm and well perfused, no pallor or cyanosis  Neurologic:  Oriented to time, place, and person and non-anxious.  No focal neurological deficit noted.  Psychiatric: Normal mood and effect. EKG today is consistent with atrial fibrillation 73 bpm with right bundle branch block and occasional PVCs. LAB REVIEW:  CBC:   Lab Results   Component Value Date/Time    WBC 7.7 06/06/2022 09:30 AM    HGB 15.6 06/06/2022 09:30 AM    HCT 47.1 06/06/2022 09:30 AM     06/06/2022 09:30 AM     Lipids:   Lab Results   Component Value Date    CHOL 175 03/17/2017    CHOLFAST 128 06/06/2022    TRIG 127 03/17/2017    TRIGLYCFAST 90 06/06/2022    HDL 35 (L) 06/06/2022    LDLCALC 75 06/06/2022     Renal:   Lab Results   Component Value Date/Time    BUN 21 06/06/2022 09:30 AM    CREATININE 1.4 06/06/2022 09:30 AM     06/06/2022 09:30 AM    K 4.1 06/06/2022 09:30 AM     PT/INR:   Lab Results   Component Value Date/Time    INR 2 12/27/2022 04:35 PM    INR 2.0 12/27/2022 04:34 PM     Lab Results   Component Value Date    LABA1C 6.3 06/06/2022     IMPRESSION and RECOMMENDATIONS:      1. Atrial fibrillation, unspecified type [facility])  Assessment & Plan:  Rate is well controlled and he is on warfarin for anticoagulation therapy with therapeutic INR and he follows up at Coumadin clinic for PT/INR regulation. Orders:  -     EKG 12 lead  2. Primary hypertension  Assessment & Plan:  Well-controlled on Benicar HydroDIURIL and Lopressor continue with all. 3. Other hyperlipidemia  Assessment & Plan:  LDL was 75.2 on 6/2022 continue Lipitor 10 mg daily. Continue current cardiovascular medications which have been reviewed and discussed individually with you. Appropriate prescriptions if needed on this visit are addressed. After visit summery is provided. Questions answered and patient verbalizes understanding. Follow up in 12 months with ECG,  sooner if needed.     Gayla Hagen MD, 1/9/2023 3:39 PM     Please note this report has been partially produced using speech recognition software and may contain errors related to that system including errors in grammar, punctuation, and spelling, as well as words and phrases that may be inappropriate. If there are any questions or concerns please feel free to contact the dictating provider for clarification.

## 2023-01-09 NOTE — PATIENT INSTRUCTIONS
Please be informed that if you contact our office outside of normal business hours the physician on call cannot help with any scheduling or rescheduling issues, procedure instruction questions or any type of medication issue. We advise you for any urgent/emergency that you go to the nearest emergency room! PLEASE CALL OUR OFFICE DURING NORMAL BUSINESS HOURS    Monday - Friday   8 am to 5 pm    [facility] [address]: [phone]    [place]:  [phone]    . **It is YOUR responsibilty to bring medication bottles and/or updated medication list to [address] [place] [address]. This will allow us to better serve you and all your healthcare needs**    Thank you for allowing us to care for you today! We want to ensure we can follow your treatment plan and we strive to give you the best outcomes and experience possible. If you ever have a life threatening emergency and call 911 - for an ambulance (EMS)   Our providers can only care for you at:   [facility] or [facility]. Even if you have someone take you or you drive yourself we can only care for you in a [facility] facility. Our providers are not setup at the other healthcare locations! Continue current cardiovascular medications which have been reviewed and discussed individually with you. Appropriate prescriptions if needed on this visit are addressed. After visit summery is provided. Questions answered and patient verbalizes understanding. Follow up in 12 months with ECG,  sooner if needed.

## 2023-02-06 RX ORDER — WARFARIN SODIUM 2.5 MG/1
TABLET ORAL
Qty: 90 TABLET | Refills: 1 | Status: SHIPPED | OUTPATIENT
Start: 2023-02-06

## 2023-03-21 ENCOUNTER — ANTI-COAG VISIT (OUTPATIENT)
Dept: PHARMACY | Age: 83
End: 2023-03-21
Payer: MEDICARE

## 2023-03-21 LAB
INR BLD: 2.4
POC INR: 2.4 INDEX
PROTHROMBIN TIME, POC: 29.1 SECONDS (ref 10–14.3)
PROTIME: 29.1 SECONDS

## 2023-03-21 PROCEDURE — 99211 OFF/OP EST MAY X REQ PHY/QHP: CPT

## 2023-03-21 PROCEDURE — 36416 COLLJ CAPILLARY BLOOD SPEC: CPT

## 2023-03-21 PROCEDURE — 85610 PROTHROMBIN TIME: CPT

## 2023-03-21 NOTE — PROGRESS NOTES
Medication Management Service  [facility]  [phone]    Visit Date: 3/21/2023   Subjective:       Sherren Bumpers is a 80 y.o. male who presents to clinic today for anticoagulation monitoring and adjustment. Patient seen in clinic for warfarin management due to  Indication:   atrial fibrillation. INR goal: of 2.0-3.0. Duration of therapy: indefinite. Patient reports the following:   Adherent with regimen  Missed or extra doses:  None   Bleeding or thromboembolic side effects:  None  Significant medication changes:  None  Significant dietary changes: None  Significant alcohol or tobacco changes: None  Significant recent illness, disease state changes, or hospitalization:  None  Upcoming surgeries or procedures:  None  Falls: None           Assessment and Plan     PT/INR done in office per protocol. INR today is 2.4, therapeutic. Plan: Will continue current regimen of warfarin 2.5mg daily except 3.5mg Mondays and Fridays. Recheck INR in 12 week(s). Patient verbalized understanding of dosing directions and information discussed. Dosing schedule given to patient including phone number, appointment date, and time. Progress note sent to referring office. Patient acknowledges working in consult agreement with pharmacist as referred by his/her physician.       Electronically signed by Dickson Snellen, [address] [place] [address] on 3/21/23 at 9:14 AM EDT    For Pharmacy Admin Tracking Only    Total # of Interventions Recommended: 0  Total # of Interventions Accepted: 0  Time Spent (min): 15

## 2023-03-28 RX ORDER — WARFARIN SODIUM 1 MG/1
TABLET ORAL
Qty: 24 TABLET | Refills: 1 | Status: SHIPPED | OUTPATIENT
Start: 2023-03-28

## 2023-04-08 ENCOUNTER — HOSPITAL ENCOUNTER (EMERGENCY)
Age: 83
Discharge: HOME | End: 2023-04-08
Attending: EMERGENCY MEDICINE
Payer: MEDICARE

## 2023-04-08 ENCOUNTER — APPOINTMENT (OUTPATIENT)
Dept: CT IMAGING | Age: 83
End: 2023-04-08
Payer: MEDICARE

## 2023-04-08 VITALS
SYSTOLIC BLOOD PRESSURE: 150 MMHG | HEIGHT: 71 IN | RESPIRATION RATE: 18 BRPM | OXYGEN SATURATION: 98 % | WEIGHT: 223 LBS | BODY MASS INDEX: 31.22 KG/M2 | DIASTOLIC BLOOD PRESSURE: 84 MMHG | HEART RATE: 92 BPM | TEMPERATURE: 98.2 F

## 2023-04-08 DIAGNOSIS — M47.26 OSTEOARTHRITIS OF SPINE WITH RADICULOPATHY, LUMBAR REGION: Primary | ICD-10-CM

## 2023-04-08 LAB
ALBUMIN SERPL-MCNC: 4.4 GM/DL (ref 3.4–5)
ALP BLD-CCNC: 91 IU/L (ref 40–129)
ALT SERPL-CCNC: 15 U/L (ref 10–40)
ANION GAP SERPL CALCULATED.3IONS-SCNC: 12 MMOL/L (ref 4–16)
AST SERPL-CCNC: 26 IU/L (ref 15–37)
BACTERIA: NEGATIVE /HPF
BASOPHILS ABSOLUTE: 0.1 K/CU MM
BASOPHILS RELATIVE PERCENT: 0.7 % (ref 0–1)
BILIRUB SERPL-MCNC: 0.7 MG/DL (ref 0–1)
BILIRUBIN URINE: NEGATIVE MG/DL
BLOOD, URINE: ABNORMAL
BUN SERPL-MCNC: 25 MG/DL (ref 6–23)
CALCIUM SERPL-MCNC: 9.5 MG/DL (ref 8.3–10.6)
CAST TYPE: NORMAL /HPF
CHLORIDE BLD-SCNC: 101 MMOL/L (ref 99–110)
CLARITY: CLEAR
CO2: 25 MMOL/L (ref 21–32)
COLOR: YELLOW
CREAT SERPL-MCNC: 1.1 MG/DL (ref 0.9–1.3)
CRYSTAL TYPE: NEGATIVE /HPF
DIFFERENTIAL TYPE: ABNORMAL
EOSINOPHILS ABSOLUTE: 0 K/CU MM
EOSINOPHILS RELATIVE PERCENT: 0.4 % (ref 0–3)
EPITHELIAL CELLS, UA: NEGATIVE /HPF
GFR SERPL CREATININE-BSD FRML MDRD: >60 ML/MIN/1.73M2
GLUCOSE SERPL-MCNC: 112 MG/DL (ref 70–99)
GLUCOSE, URINE: NEGATIVE MG/DL
HCT VFR BLD CALC: 44.3 % (ref 42–52)
HEMOGLOBIN: 14.4 GM/DL (ref 13.5–18)
IMMATURE NEUTROPHIL %: 0.1 % (ref 0–0.43)
INR BLD: 2.12 INDEX
KETONES, URINE: NEGATIVE MG/DL
LEUKOCYTE ESTERASE, URINE: NEGATIVE
LYMPHOCYTES ABSOLUTE: 1.7 K/CU MM
LYMPHOCYTES RELATIVE PERCENT: 23.5 % (ref 24–44)
MCH RBC QN AUTO: 33 PG (ref 27–31)
MCHC RBC AUTO-ENTMCNC: 32.5 % (ref 32–36)
MCV RBC AUTO: 101.6 FL (ref 78–100)
MONOCYTES ABSOLUTE: 0.5 K/CU MM
MONOCYTES RELATIVE PERCENT: 7.2 % (ref 0–4)
NITRITE URINE, QUANTITATIVE: NEGATIVE
PDW BLD-RTO: 12.4 % (ref 11.7–14.9)
PH, URINE: 6.5 (ref 5–8)
PLATELET # BLD: 219 K/CU MM (ref 140–440)
PMV BLD AUTO: 9.7 FL (ref 7.5–11.1)
POTASSIUM SERPL-SCNC: 4.3 MMOL/L (ref 3.5–5.1)
PRO-BNP: 1400 PG/ML
PROTEIN UA: NEGATIVE MG/DL
PROTHROMBIN TIME: 26 SECONDS (ref 11.7–14.5)
RBC # BLD: 4.36 M/CU MM (ref 4.6–6.2)
RBC URINE: 2 /HPF (ref 0–3)
SEGMENTED NEUTROPHILS ABSOLUTE COUNT: 4.8 K/CU MM
SEGMENTED NEUTROPHILS RELATIVE PERCENT: 68.1 % (ref 36–66)
SODIUM BLD-SCNC: 138 MMOL/L (ref 135–145)
SPECIFIC GRAVITY UA: <1.005 (ref 1–1.03)
TOTAL IMMATURE NEUTOROPHIL: 0.01 K/CU MM
TOTAL PROTEIN: 7.1 GM/DL (ref 6.4–8.2)
UROBILINOGEN, URINE: 0.2 MG/DL (ref 0.2–1)
WBC # BLD: 7.1 K/CU MM (ref 4–10.5)
WBC UA: NEGATIVE /HPF (ref 0–2)

## 2023-04-08 PROCEDURE — 85610 PROTHROMBIN TIME: CPT

## 2023-04-08 PROCEDURE — 81001 URINALYSIS AUTO W/SCOPE: CPT

## 2023-04-08 PROCEDURE — 85025 COMPLETE CBC W/AUTO DIFF WBC: CPT

## 2023-04-08 PROCEDURE — 72131 CT LUMBAR SPINE W/O DYE: CPT

## 2023-04-08 PROCEDURE — 6370000000 HC RX 637 (ALT 250 FOR IP): Performed by: EMERGENCY MEDICINE

## 2023-04-08 PROCEDURE — 6360000002 HC RX W HCPCS: Performed by: EMERGENCY MEDICINE

## 2023-04-08 PROCEDURE — 83880 ASSAY OF NATRIURETIC PEPTIDE: CPT

## 2023-04-08 PROCEDURE — 80053 COMPREHEN METABOLIC PANEL: CPT

## 2023-04-08 RX ORDER — PREDNISONE 20 MG/1
20 TABLET ORAL 2 TIMES DAILY
Qty: 10 TABLET | Refills: 0 | Status: SHIPPED | OUTPATIENT
Start: 2023-04-08 | End: 2023-04-13

## 2023-04-08 RX ORDER — HYDROCODONE BITARTRATE AND ACETAMINOPHEN 5; 325 MG/1; MG/1
1 TABLET ORAL EVERY 6 HOURS PRN
Qty: 9 TABLET | Refills: 0 | Status: SHIPPED | OUTPATIENT
Start: 2023-04-08 | End: 2023-04-11

## 2023-04-08 RX ORDER — METHOCARBAMOL 500 MG/1
500 TABLET, FILM COATED ORAL ONCE
Status: COMPLETED | OUTPATIENT
Start: 2023-04-08 | End: 2023-04-08

## 2023-04-08 RX ORDER — ACETAMINOPHEN 325 MG/1
650 TABLET ORAL ONCE
Status: COMPLETED | OUTPATIENT
Start: 2023-04-08 | End: 2023-04-08

## 2023-04-08 RX ORDER — METHOCARBAMOL 500 MG/1
500 TABLET, FILM COATED ORAL 2 TIMES DAILY
Qty: 20 TABLET | Refills: 0 | Status: SHIPPED | OUTPATIENT
Start: 2023-04-08 | End: 2023-04-18

## 2023-04-08 RX ORDER — HYDROCODONE BITARTRATE AND ACETAMINOPHEN 5; 325 MG/1; MG/1
1 TABLET ORAL ONCE
Status: COMPLETED | OUTPATIENT
Start: 2023-04-08 | End: 2023-04-08

## 2023-04-08 RX ORDER — LIDOCAINE 50 MG/G
1 PATCH TOPICAL DAILY
Qty: 10 PATCH | Refills: 0 | Status: SHIPPED | OUTPATIENT
Start: 2023-04-08 | End: 2023-04-18

## 2023-04-08 RX ADMIN — Medication 10 MG: at 18:50

## 2023-04-08 RX ADMIN — HYDROCODONE BITARTRATE AND ACETAMINOPHEN 1 TABLET: 5; 325 TABLET ORAL at 18:48

## 2023-04-08 RX ADMIN — ACETAMINOPHEN 650 MG: 325 TABLET ORAL at 18:50

## 2023-04-08 RX ADMIN — METHOCARBAMOL 500 MG: 500 TABLET ORAL at 18:48

## 2023-04-08 ASSESSMENT — PAIN DESCRIPTION - ORIENTATION
ORIENTATION: LEFT
ORIENTATION: LEFT

## 2023-04-08 ASSESSMENT — PAIN - FUNCTIONAL ASSESSMENT: PAIN_FUNCTIONAL_ASSESSMENT: 0-10

## 2023-04-08 ASSESSMENT — PAIN DESCRIPTION - LOCATION
LOCATION: BACK;LEG;SHOULDER
LOCATION: BACK;SHOULDER
LOCATION: LEG

## 2023-04-08 ASSESSMENT — PAIN SCALES - GENERAL
PAINLEVEL_OUTOF10: 1
PAINLEVEL_OUTOF10: 3
PAINLEVEL_OUTOF10: 1
PAINLEVEL_OUTOF10: 3

## 2023-04-08 ASSESSMENT — PAIN DESCRIPTION - DESCRIPTORS
DESCRIPTORS: ACHING
DESCRIPTORS: ACHING

## 2023-04-08 NOTE — DISCHARGE INSTRUCTIONS
Follow-up with your primary care physician in 2 days for reevaluation. Call for an appointment  Take Anthony Leslie as needed for pain. No drinking or driving while taking  Robaxin muscle relaxants twice a day as needed. No drinking or driving while taking  Take prednisone 20 mg twice a day for 5 days for inflammation pain and swelling  Apply Lidoderm patch to affected area twice a day  Return to the emergency department immediately any pain fever chills nausea vomiting dizzy lightheadedness or any worsening symptoms.

## 2023-04-08 NOTE — ED NOTES
Pt sitting on edge of bed, offered pt to sit in chair but pt declined     Hermann Chan RN  04/08/23 1914

## 2023-04-08 NOTE — ED NOTES
Dr. Hi Ort in with pt and speaking with pt and family, evaluation done. Pt ambulates to restroom and back, gait steady, Dr. Kolton Burgos encouraged pt to take iv medications and states he only wants oral medications.      Dixon Lira RN  04/08/23 3529

## 2023-04-08 NOTE — ED NOTES
Nursing student in room to start iv, pt became very angry with her and told her to take the iv out and leave the room stating that she was the worse person to start his iv and demanded she remove it.      Claudene Burly, RN  04/08/23 6073

## 2023-04-09 NOTE — ED PROVIDER NOTES
acetaminophen (TYLENOL) tablet 650 mg (650 mg Oral Given 4/8/23 1850)   methocarbamol (ROBAXIN) tablet 500 mg (500 mg Oral Given 4/8/23 1848)       Vitals:    04/08/23 1715   BP: (!) 152/99   Pulse: 86   Resp: 18   Temp: 98.2 °F (36.8 °C)   TempSrc: Oral   SpO2: 99%   Weight: 223 lb (101.2 kg)   Height: 5' 11\" (1.803 m)       CT LUMBAR SPINE WO CONTRAST   Final Result   1. No acute osseous abnormality of the lumbar spine. 2. Moderate to severe multilevel degenerative disease and facet arthropathy. Please see Dr. Piter Mack note. Patient improved and in no distress. FINAL IMPRESSION:  1. Osteoarthritis of spine with radiculopathy, lumbar region        New Prescriptions    HYDROCODONE-ACETAMINOPHEN (NORCO) 5-325 MG PER TABLET    Take 1 tablet by mouth every 6 hours as needed for Pain for up to 3 days. Intended supply: 3 days. Take lowest dose possible to manage pain Max Daily Amount: 4 tablets    LIDOCAINE (LIDODERM) 5 %    Place 1 patch onto the skin daily for 10 days 12 hours on, 12 hours off.     METHOCARBAMOL (ROBAXIN) 500 MG TABLET    Take 1 tablet by mouth in the morning and at bedtime for 10 days    PREDNISONE (DELTASONE) 20 MG TABLET    Take 1 tablet by mouth 2 times daily for 5 days                 Lucia Fang DO  04/08/23 2001
(LIPITOR) 10 MG tablet Take 10 mg by mouth daily      oxybutynin (DITROPAN-XL) 5 MG extended release tablet Take 5 mg by mouth every evening      cetirizine (ZYRTEC) 10 MG tablet Take 10 mg by mouth daily as needed for Allergies (Patient not taking: Reported on 1/9/2023)      acetaminophen (TYLENOL) 650 MG extended release tablet Take 650 mg by mouth every 8 hours as needed for Pain      olmesartan (BENICAR) 5 MG tablet Take 5 mg by mouth 2 times daily      hydrochlorothiazide (HYDRODIURIL) 25 MG tablet Take 25 mg by mouth daily      metoprolol (LOPRESSOR) 50 MG tablet Take 1 tablet by mouth 2 times daily 60 tablet 6     Allergies   Allergen Reactions    Latex Rash    Dicyclomine     Neosporin [Bacitracin-Neomycin-Polymyxin] Rash       Nursing Notes Reviewed    Physical Exam:  Triage VS:    ED Triage Vitals [04/08/23 8636]   Enc Vitals Group      BP (!) 152/99      Heart Rate 86      Resp 18      Temp 98.2 °F (36.8 °C)      Temp Source Oral      SpO2 99 %      Weight 223 lb (101.2 kg)      Height 5' 11\" (1.803 m)      Head Circumference       Peak Flow       Pain Score       Pain Loc       Pain Edu? Excl. in [address]? My pulse ox interpretation is - normal    General appearance:  No acute distress. Skin:  Warm. Dry. Eye:  Extraocular movements intact. Ears, nose, mouth and throat:  Oral mucosa moist   Neck:  Trachea midline. Extremity:  No swelling. Normal ROM     Heart:  Regular rate and rhythm, normal S1 & S2, no extra heart sounds. Perfusion:  intact  Respiratory:  Lungs clear to auscultation bilaterally. Respirations nonlabored. Abdominal:  Normal bowel sounds. Soft. Nontender. Non distended.   Back: Mild low back pain across lower lumbar spine no midline vertebral spine crepitus step-off or deformity, normal flexion of the lumbar spine, no saddle anesthesia, normal ankle dorsiflexion plantarflexion against resistance, normal strength against resistance of bilateral lower extremities

## 2023-05-15 RX ORDER — WARFARIN SODIUM 2.5 MG/1
TABLET ORAL
Qty: 90 TABLET | Refills: 1 | Status: SHIPPED | OUTPATIENT
Start: 2023-05-15

## 2023-06-21 ENCOUNTER — TELEPHONE (OUTPATIENT)
Dept: CARDIOLOGY CLINIC | Age: 83
End: 2023-06-21

## 2023-06-21 NOTE — TELEPHONE ENCOUNTER
Cardiologist: Dr. Margaret Riddle  Surgeon: [id number] [facility]   Surgery: tooth extraction(s)   Anesthesia: Nitros Oxide, Local with epinephrine  Date: TBD  FAX# [phone]    Ph# [phone]    Last OV 1/9/23 quinten/Domonique

## 2023-06-27 ENCOUNTER — TELEPHONE (OUTPATIENT)
Dept: PHARMACY | Age: 83
End: 2023-06-27

## 2023-07-11 ENCOUNTER — ANTI-COAG VISIT (OUTPATIENT)
Dept: PHARMACY | Age: 83
End: 2023-07-11
Payer: MEDICARE

## 2023-07-11 LAB
INR BLD: 2.8
POC INR: 2.8 INDEX
PROTHROMBIN TIME, POC: 33.1 SECONDS (ref 10–14.3)
PROTIME: 33.1 SECONDS

## 2023-07-11 PROCEDURE — 36416 COLLJ CAPILLARY BLOOD SPEC: CPT

## 2023-07-11 PROCEDURE — 85610 PROTHROMBIN TIME: CPT

## 2023-07-11 PROCEDURE — 99211 OFF/OP EST MAY X REQ PHY/QHP: CPT

## 2023-07-11 NOTE — PROGRESS NOTES
Medication Management Service  [facility]  [phone]    Visit Date: 7/11/2023   Subjective:       Kathy Nava is a 80 y.o. male who presents to clinic today for anticoagulation monitoring and adjustment. Patient seen in clinic for warfarin management due to  Indication:   atrial fibrillation. INR goal: of 2.0-3.0. Duration of therapy: indefinite. Patient reports the following:   Adherent with regimen  Missed or extra doses: Held warfarin 6/25 - 6/27 for dental procedure; Took 3.75mg x 2 days upon resuming  Bleeding or thromboembolic side effects:  None  Significant medication changes:  Doxycycline x 10 days - completed  Significant dietary changes: None  Significant alcohol or tobacco changes: None  Significant recent illness, disease state changes, or hospitalization:  None  Upcoming surgeries or procedures:  None  Falls: None           Assessment and Plan     PT/INR done in office per protocol. INR today is 2.8, therapeutic. Plan: Will continue current regimen of warfarin 2.5mg daily except 3.5mg Mondays and Fridays. Recheck INR in 8 week(s). Patient verbalized understanding of dosing directions and information discussed. Dosing schedule given to patient including phone number, appointment date, and time. Progress note sent to referring office. Patient acknowledges working in consult agreement with pharmacist as referred by his/her physician.       Electronically signed by KAROLINE Leon [facility] on 7/11/23 at 8:21 AM [id number] [facility]. Only    Total # of Interventions Recommended: 0  Total # of Interventions Accepted: 0  Time Spent (min): 15

## 2023-07-14 DIAGNOSIS — I48.91 ATRIAL FIBRILLATION, UNSPECIFIED TYPE (HCC): Primary | ICD-10-CM

## 2023-08-29 ENCOUNTER — TELEPHONE (OUTPATIENT)
Dept: PHARMACY | Age: 83
End: 2023-08-29

## 2023-08-29 NOTE — TELEPHONE ENCOUNTER
Patient left  stating he is holding warfarin 8/27 - 8/29 for procedure on 8/30. Returned call. No answer. Left  requesting call back.     For Pharmacy Admin Tracking Only    Time Spent (min): 5

## 2023-08-29 NOTE — TELEPHONE ENCOUNTER
Patient returned call. He is having 4 teeth pulled on Wednesday. Instructed patient to resume warfarin at the instruction of provider. Upon resuming, take warfarin 3.5mg x 2 days then continue warfarin 2.5mg daily except 3.5mg Mondays and Fridays. INR already scheduled for 9/5. Patient voiced understanding.     For Pharmacy Admin Tracking Only    Intervention Detail: Dose Adjustment: 1, reason: Therapy Optimization  Total # of Interventions Recommended: 1  Total # of Interventions Accepted: 1  Time Spent (min): 10

## 2023-08-30 ENCOUNTER — HOSPITAL ENCOUNTER (EMERGENCY)
Age: 83
Discharge: HOME | End: 2023-08-31
Attending: EMERGENCY MEDICINE
Payer: MEDICARE

## 2023-08-30 VITALS
RESPIRATION RATE: 16 BRPM | HEART RATE: 84 BPM | OXYGEN SATURATION: 99 % | SYSTOLIC BLOOD PRESSURE: 163 MMHG | DIASTOLIC BLOOD PRESSURE: 94 MMHG | WEIGHT: 220 LBS | TEMPERATURE: 98.6 F | BODY MASS INDEX: 30.68 KG/M2

## 2023-08-30 DIAGNOSIS — K91.840 SURGICAL WOUND HEMORRHAGE AFTER DENTAL PROCEDURE: Primary | ICD-10-CM

## 2023-08-30 DIAGNOSIS — Z98.818 SURGICAL WOUND HEMORRHAGE AFTER DENTAL PROCEDURE: Primary | ICD-10-CM

## 2023-08-30 PROCEDURE — 99282 EMERGENCY DEPT VISIT SF MDM: CPT

## 2023-08-31 ENCOUNTER — TELEPHONE (OUTPATIENT)
Dept: CARDIOLOGY CLINIC | Age: 83
End: 2023-08-31

## 2023-08-31 ASSESSMENT — ENCOUNTER SYMPTOMS
EYES NEGATIVE: 1
RESPIRATORY NEGATIVE: 1
FACIAL SWELLING: 0
GASTROINTESTINAL NEGATIVE: 1
TRISMUS: 0

## 2023-08-31 NOTE — TELEPHONE ENCOUNTER
Patient had teeth removed 4 days ago and wants to know when he can go back on warfarin. He has been off since 8/26/23.

## 2023-08-31 NOTE — ED PROVIDER NOTES
The history is provided by the patient. Dental Pain  Location:  Lower  Quality:  Aching and dull  Severity:  Mild  Onset quality:  Sudden  Timing:  Constant  Progression:  Unchanged  Chronicity:  New  Context comment:  Patient has mild pain after having teeth pulled and he is having bleeding. He is on coumadin. pressure slowed bleeding  Previous work-up:  Dental exam  Relieved by:  Nothing  Worsened by:  Nothing  Ineffective treatments:  None tried  Associated symptoms: gum swelling and oral bleeding    Associated symptoms: no congestion, no difficulty swallowing, no drooling, no facial pain, no facial swelling, no fever, no headaches, no neck pain, no neck swelling, no oral lesions and no trismus      Review of Systems   Constitutional: Negative. Negative for fever. HENT: Negative. Negative for congestion, drooling, facial swelling and mouth sores. Eyes: Negative. Respiratory: Negative. Cardiovascular: Negative. Gastrointestinal: Negative. Genitourinary: Negative. Musculoskeletal: Negative. Negative for neck pain. Skin: Negative. Neurological: Negative. Negative for headaches. All other systems reviewed and are negative. History reviewed. No pertinent family history.   Social History     Socioeconomic History    Marital status:      Spouse name: Not on file    Number of children: Not on file    Years of education: Not on file    Highest education level: Not on file   Occupational History    Occupation: Retired   Tobacco Use    Smoking status: Former     Years: 40.00     Types: Cigarettes     Quit date: 2000     Years since quittin.6    Smokeless tobacco: Never    Tobacco comments:     smoked pipe   Vaping Use    Vaping Use: Never used   Substance and Sexual Activity    Alcohol use: No    Drug use: No    Sexual activity: Yes     Partners: Female     Comment: marriied   Other Topics Concern    Not on file   Social History Narrative    Not on file     Social

## 2023-09-05 ENCOUNTER — ANTI-COAG VISIT (OUTPATIENT)
Dept: PHARMACY | Age: 83
End: 2023-09-05
Payer: MEDICARE

## 2023-09-05 LAB
INR BLD: 1.8
POC INR: 1.8 INDEX
PROTHROMBIN TIME, POC: 21.3 SECONDS (ref 10–14.3)
PROTIME: 21.3 SECONDS

## 2023-09-05 PROCEDURE — 99212 OFFICE O/P EST SF 10 MIN: CPT

## 2023-09-05 PROCEDURE — 36416 COLLJ CAPILLARY BLOOD SPEC: CPT

## 2023-09-05 PROCEDURE — 85610 PROTHROMBIN TIME: CPT

## 2023-09-05 NOTE — PROGRESS NOTES
Medication Management Service  [facility]  [phone]    Visit Date: 9/5/2023   Subjective:       Flaquito Metcalf is a 80 y.o. male who presents to clinic today for anticoagulation monitoring and adjustment. Patient seen in clinic for warfarin management due to  Indication:   atrial fibrillation. INR goal: of 2.0-3.0. Duration of therapy: indefinite. Patient reports the following:   Adherent with regimen  Missed or extra doses:  Held warfarin 8/27 - 8/30 for procedure on 8/30; Upon resuming, took warfarin 3.5mg x 2 times on 8/31  Bleeding or thromboembolic side effects: bleeding under dentures - appointment with dentist tomorrow  Significant medication changes:  None  Significant dietary changes: None  Significant alcohol or tobacco changes: None  Significant recent illness, disease state changes, or hospitalization:  ER 8/30 for bleeding after dental procedure  Upcoming surgeries or procedures:  None  Falls: None           Assessment and Plan     PT/INR done in office per protocol. INR today is 1.8, slightly below goal range likely due to held doses. Plan:  Take warfarin 3.75mg x 1 then will continue current regimen of warfarin 2.5mg daily except 3.5mg Mondays and Fridays. Recheck INR in 1.5 week(s). Patient verbalized understanding of dosing directions and information discussed. Dosing schedule given to patient including phone number, appointment date, and time. Progress note sent to referring office.     Electronically signed by Doroteo Man [facility] on 9/5/23    For Pharmacy Admin Tracking Only    Intervention Detail: Dose Adjustment: 1, reason: Therapy Optimization  Total # of Interventions Recommended: 1  Total # of Interventions Accepted: 1  Time Spent (min): 15

## 2023-09-11 RX ORDER — WARFARIN SODIUM 1 MG/1
TABLET ORAL
Qty: 24 TABLET | Refills: 1 | Status: SHIPPED | OUTPATIENT
Start: 2023-09-11

## 2023-09-15 ENCOUNTER — ANTI-COAG VISIT (OUTPATIENT)
Dept: PHARMACY | Age: 83
End: 2023-09-15
Payer: MEDICARE

## 2023-09-15 LAB
INR BLD: 2.3
POC INR: 2.3 INDEX
PROTHROMBIN TIME, POC: 27.5 SECONDS (ref 10–14.3)
PROTIME: 27.5 SECONDS

## 2023-09-15 PROCEDURE — 99211 OFF/OP EST MAY X REQ PHY/QHP: CPT

## 2023-09-15 PROCEDURE — 85610 PROTHROMBIN TIME: CPT

## 2023-09-15 PROCEDURE — 36416 COLLJ CAPILLARY BLOOD SPEC: CPT

## 2023-09-15 NOTE — PROGRESS NOTES
Medication Management Service  [facility]  [phone]    Visit Date: 9/15/2023   Subjective:       Susanne Jackson is a 80 y.o. male who presents to clinic today for anticoagulation monitoring and adjustment. Patient seen in clinic for warfarin management due to  Indication:   atrial fibrillation. INR goal: of 2.0-3.0. Duration of therapy: indefinite. Patient reports the following:   Adherent with regimen  Missed or extra doses:  None   Bleeding or thromboembolic side effects:  None  Significant medication changes:  None  Significant dietary changes: None  Significant alcohol or tobacco changes: None  Significant recent illness, disease state changes, or hospitalization:  None  Upcoming surgeries or procedures:  None  Falls: None           Assessment and Plan     PT/INR done in office per protocol. INR today is 2.3, therapeutic. Plan: Will continue current regimen of warfarin 2.5mg daily except 3.5mg Mondays and Fridays. Recheck INR in 11 week(s). Patient verbalized understanding of dosing directions and information discussed. Dosing schedule given to patient including phone number, appointment date, and time. Progress note sent to referring office.     Electronically signed by KAROLINE Noguera [facility] on 9/15/23    For Pharmacy Admin Tracking Only    Total # of Interventions Recommended: 0  Total # of Interventions Accepted: 0  Time Spent (min): 15

## 2023-11-13 RX ORDER — WARFARIN SODIUM 2.5 MG/1
TABLET ORAL
Qty: 90 TABLET | Refills: 1 | Status: SHIPPED | OUTPATIENT
Start: 2023-11-13

## 2023-11-17 ENCOUNTER — TELEPHONE (OUTPATIENT)
Dept: PHARMACY | Age: 83
End: 2023-11-17

## 2023-11-17 NOTE — TELEPHONE ENCOUNTER
Patient to start Cipro x 7 days. May cause INR elevation. Will monitor closely and rescheduled for 11/21.     For Pharmacy Admin Tracking Only    Time Spent (min): 5

## 2023-11-21 ENCOUNTER — APPOINTMENT (OUTPATIENT)
Dept: PHARMACY | Age: 83
End: 2023-11-21
Payer: MEDICARE

## 2023-11-21 ENCOUNTER — TELEPHONE (OUTPATIENT)
Dept: PHARMACY | Age: 83
End: 2023-11-21

## 2023-11-21 NOTE — TELEPHONE ENCOUNTER
Patient left VM stating he is starting on Myrbetriq today. Myrbetriq does not interact with warfarin. Called patient to notify of the above. Patient voiced understanding.     For Pharmacy Admin Tracking Only    Time Spent (min): 5

## 2023-11-21 NOTE — PROGRESS NOTES
Medication Management Service  [facility]  [phone]    Visit Date: 11/21/2023   Subjective:       Regis Pino is a 80 y.o. male who presents to clinic today for anticoagulation monitoring and adjustment. Patient seen in clinic for warfarin management due to  Indication:   atrial fibrillation. INR goal: of 2.0-3.0. Duration of therapy: indefinite. Patient reports the following:   Adherent with regimen  Missed or extra doses:  None   Bleeding or thromboembolic side effects:  None  Significant medication changes:  Cipro x 7 days started 11/17 - may cause INR elevation  Significant dietary changes: None  Significant alcohol or tobacco changes: None  Significant recent illness, disease state changes, or hospitalization:  None  Upcoming surgeries or procedures:  None  Falls: None           Assessment and Plan     PT/INR done in office per protocol. INR today is ***, {INR TYPE:[id number]}. Plan: Will continue current regimen of warfarin ***. Recheck INR in 12 week(s). Patient verbalized understanding of dosing directions and information discussed. Dosing schedule given to patient including phone number, appointment date, and time. Progress note sent to referring office.     Electronically signed by Jakob Dover [facility] on 11/21/23    For Pharmacy Admin Tracking Only    Intervention Detail: {Anticoag Intervention ZJKGRO:60368}  Total # of Interventions Recommended: {Count:[id number]}  Total # of Interventions Accepted: {Count:[id number]}  Time Spent (min): {Time Spent:05456}

## 2023-11-28 ENCOUNTER — ANTI-COAG VISIT (OUTPATIENT)
Dept: PHARMACY | Age: 83
End: 2023-11-28
Payer: MEDICARE

## 2023-11-28 ENCOUNTER — APPOINTMENT (OUTPATIENT)
Dept: PHARMACY | Age: 83
End: 2023-11-28
Payer: MEDICARE

## 2023-11-28 LAB
INR BLD: 2.7
POC INR: 2.7 INDEX
PROTHROMBIN TIME, POC: 32.4 SECONDS (ref 10–14.3)
PROTIME: 32.4 SECONDS

## 2023-11-28 PROCEDURE — 36416 COLLJ CAPILLARY BLOOD SPEC: CPT

## 2023-11-28 PROCEDURE — 99211 OFF/OP EST MAY X REQ PHY/QHP: CPT

## 2023-11-28 PROCEDURE — 85610 PROTHROMBIN TIME: CPT

## 2023-11-28 NOTE — PROGRESS NOTES
Medication Management Service  [facility]  [phone]    Visit Date: 11/28/2023   Subjective:       Flaquito Metcalf is a 80 y.o. male who presents to clinic today for anticoagulation monitoring and adjustment. Patient seen in clinic for warfarin management due to  Indication:   atrial fibrillation. INR goal: of 2.0-3.0. Duration of therapy: indefinite. Patient reports the following:   Adherent with regimen  Missed or extra doses:  None   Bleeding or thromboembolic side effects:  None  Significant medication changes:  None  Significant dietary changes: None  Significant alcohol or tobacco changes: None  Significant recent illness, disease state changes, or hospitalization:  None  Upcoming surgeries or procedures:  None  Falls: None           Assessment and Plan     PT/INR done in office per protocol. INR today is 2.,7 therapeutic. Plan: Will continue current regimen of warfarin 2.5mg daily except 3.5mg Mondays and Fridays. Recheck INR in 12 week(s). Patient verbalized understanding of dosing directions and information discussed. Dosing schedule given to patient including phone number, appointment date, and time. Progress note sent to referring office.     Electronically signed by KAROLINE Hendrickson [facility] on 11/28/23    For Pharmacy Admin Tracking Only    Total # of Interventions Recommended: 0  Total # of Interventions Accepted: 0  Time Spent (min): 15

## 2024-01-30 ENCOUNTER — OFFICE VISIT (OUTPATIENT)
Dept: CARDIOLOGY CLINIC | Age: 84
End: 2024-01-30
Payer: MEDICARE

## 2024-01-30 VITALS
HEART RATE: 88 BPM | HEIGHT: 71 IN | DIASTOLIC BLOOD PRESSURE: 62 MMHG | SYSTOLIC BLOOD PRESSURE: 114 MMHG | BODY MASS INDEX: 29.4 KG/M2 | WEIGHT: 210 LBS

## 2024-01-30 DIAGNOSIS — R63.4 WEIGHT LOSS: ICD-10-CM

## 2024-01-30 DIAGNOSIS — I10 PRIMARY HYPERTENSION: ICD-10-CM

## 2024-01-30 DIAGNOSIS — Z91.81 AT HIGH RISK FOR INJURY RELATED TO FALL: ICD-10-CM

## 2024-01-30 DIAGNOSIS — I48.21 PERMANENT ATRIAL FIBRILLATION (HCC): Primary | ICD-10-CM

## 2024-01-30 DIAGNOSIS — E78.49 OTHER HYPERLIPIDEMIA: ICD-10-CM

## 2024-01-30 PROCEDURE — G8417 CALC BMI ABV UP PARAM F/U: HCPCS | Performed by: INTERNAL MEDICINE

## 2024-01-30 PROCEDURE — 1036F TOBACCO NON-USER: CPT | Performed by: INTERNAL MEDICINE

## 2024-01-30 PROCEDURE — G8484 FLU IMMUNIZE NO ADMIN: HCPCS | Performed by: INTERNAL MEDICINE

## 2024-01-30 PROCEDURE — 3074F SYST BP LT 130 MM HG: CPT | Performed by: INTERNAL MEDICINE

## 2024-01-30 PROCEDURE — 3078F DIAST BP <80 MM HG: CPT | Performed by: INTERNAL MEDICINE

## 2024-01-30 PROCEDURE — 93000 ELECTROCARDIOGRAM COMPLETE: CPT | Performed by: INTERNAL MEDICINE

## 2024-01-30 PROCEDURE — 1123F ACP DISCUSS/DSCN MKR DOCD: CPT | Performed by: INTERNAL MEDICINE

## 2024-01-30 PROCEDURE — 99214 OFFICE O/P EST MOD 30 MIN: CPT | Performed by: INTERNAL MEDICINE

## 2024-01-30 PROCEDURE — G8427 DOCREV CUR MEDS BY ELIG CLIN: HCPCS | Performed by: INTERNAL MEDICINE

## 2024-01-30 NOTE — ASSESSMENT & PLAN NOTE
He says he is not trying to lose weight however he is lost about 13 pounds in the last 12 months.  Patient is following up with PCP next month or so and may need further workup for unintentional weight loss.

## 2024-01-30 NOTE — ASSESSMENT & PLAN NOTE
Rate is well-controlled and he is anticoagulated with therapeutic INR.  Follows up at Coumadin clinic for PT/INR regulation.

## 2024-01-30 NOTE — ASSESSMENT & PLAN NOTE
Patient is educated about being high risk for fall.  Maximum fall precautions counseled.  Questions answered.  Patient verbalized understanding.

## 2024-01-30 NOTE — PATIENT INSTRUCTIONS
Continue current cardiovascular medications which have been reviewed and discussed individually with you.  Counseled for fall precautions and follow up with PCP for unintentional weight loss. Appropriate prescriptions if needed on this visit are addressed. After visit summery is provided.   Questions answered and patient verbalizes understanding. Follow up in 6 months,  sooner if needed.

## 2024-01-30 NOTE — PROGRESS NOTES
Juwan Hooks  1940  Carlos De Los Santos MD      Chief Complaint   Patient presents with    Atrial Fibrillation    Hypertension    Hyperlipidemia     OV for 1 year check. Pt denies any chest pain,SOB,dizziness,swelling to ankles, or palpitations.     Chief complaint and HPI:  Juwan Hooks  is a 83 y.o. male following up for chronic permanent atrial fibrillation and has hypertension hyperlipidemia.  He denies any new cardiac symptoms.  He is compliant to his medication but forgot to bring his medications and he says he has been started on a new medication for prostate by Dr. Crooks he does not remember the name and he just started this 2 days ago.  He is having pro time being monitored and Coumadin clinic once in 3 months as it has been fairly stable.  I have asked him to have pro time checked sooner than later if there is any change in his medications.  He verbalized understanding.    Rest of the Cardiovascular system review is otherwise unchanged from prior encounter.  Past medical history:  has a past medical history of At high risk for injury related to fall, Atrial flutter (HCC), H/O Doppler ultrasound, H/O echocardiogram, H/O myocardial perfusion scan, Hypertension, Varicose veins, and Venous insufficiency.  Past surgical history:  has a past surgical history that includes Cholecystectomy; hernia repair; Tooth Extraction; Cataract removal; Hemorrhoid surgery; Prostate Cryoablation (2016); cyst removal; Cardioversion (3/6/2012); and Colonoscopy (N/A, 2020).  Social History:   Social History     Tobacco Use    Smoking status: Former     Current packs/day: 0.00     Types: Cigarettes     Start date: 1960     Quit date: 2000     Years since quittin.0    Smokeless tobacco: Never    Tobacco comments:     smoked pipe   Substance Use Topics    Alcohol use: No     Family history: family history is not on file.  ALLERGIES:  Latex, Dicyclomine, and Neosporin

## 2024-01-30 NOTE — ASSESSMENT & PLAN NOTE
Recent labs are not available in the last 12 months.  Follows up with PCP and he is on atorvastatin 10 mg daily.

## 2024-02-02 ENCOUNTER — TELEPHONE (OUTPATIENT)
Dept: PHARMACY | Age: 84
End: 2024-02-02

## 2024-02-02 NOTE — TELEPHONE ENCOUNTER
Patient notified clinic he stopped oxybutynin and started solifenacin. Both medications do not interact with warfarin. Patient voiced understanding.     For Pharmacy Admin Tracking Only    Time Spent (min): 5

## 2024-02-13 ENCOUNTER — TELEPHONE (OUTPATIENT)
Dept: PHARMACY | Age: 84
End: 2024-02-13

## 2024-02-13 RX ORDER — WARFARIN SODIUM 2.5 MG/1
TABLET ORAL
Qty: 90 TABLET | Refills: 1 | Status: SHIPPED | OUTPATIENT
Start: 2024-02-13

## 2024-02-13 RX ORDER — WARFARIN SODIUM 1 MG/1
TABLET ORAL
Qty: 24 TABLET | Refills: 1 | Status: SHIPPED | OUTPATIENT
Start: 2024-02-13

## 2024-02-13 NOTE — TELEPHONE ENCOUNTER
Patient requested refill for warfarin.    E-Rx sent to [facility] for warfarin 2.5mg tablets #90 with 1 refill and warfarin 1mg tablets #24 with 1 refill.    For Pharmacy Admin Tracking Only    Intervention Detail: Refill(s) Provided  Total # of Interventions Recommended: 2  Total # of Interventions Accepted: 2  Time Spent (min): 10

## 2024-02-19 NOTE — PROGRESS NOTES
Medication Management Service  [facility]  [phone]    Visit Date: 2/20/2024   Subjective:       Juwan Hooks is a 83 y.o. male who presents to clinic today for anticoagulation monitoring and adjustment.    Patient seen in clinic for warfarin management due to  Indication:   atrial fibrillation.   INR goal: of 2.0-3.0.  Duration of therapy: indefinite.    Patient reports the following:   Adherent with regimen  Missed or extra doses:  None   Bleeding or thromboembolic side effects:  None  Significant medication changes:  None  Significant dietary changes: Increased vitamin K intake; plans to resume previous intake  Significant alcohol or tobacco changes: None  Significant recent illness, disease state changes, or hospitalization:  None  Upcoming surgeries or procedures:  None  Falls: None           Assessment and Plan     PT/INR done in office per protocol.   INR today is 1.8, slightly below goal range.      Plan:  Take 3.75mg x 1 then will continue current regimen of warfarin 2.5mg daily except 3.5mg Mondays and Fridays.     Recheck INR in 12 week(s).     Patient verbalized understanding of dosing directions and information discussed. Dosing schedule given to patient including phone number, appointment date, and time. Progress note sent to referring office.    Electronically signed by Britt Tobar RPH on 2/20/24    For Pharmacy Admin Tracking Only    Intervention Detail: Dose Adjustment: 1, reason: Therapy Optimization  Total # of Interventions Recommended: 1  Total # of Interventions Accepted: 1  Time Spent (min): 15

## 2024-02-20 ENCOUNTER — ANTI-COAG VISIT (OUTPATIENT)
Dept: PHARMACY | Age: 84
End: 2024-02-20
Payer: MEDICARE

## 2024-02-20 LAB
INR BLD: 1.8
PROTIME: 22.2 SECONDS

## 2024-02-20 PROCEDURE — 99212 OFFICE O/P EST SF 10 MIN: CPT

## 2024-02-20 PROCEDURE — 36416 COLLJ CAPILLARY BLOOD SPEC: CPT

## 2024-02-26 RX ORDER — WARFARIN SODIUM 1 MG/1
TABLET ORAL
Qty: 24 TABLET | Refills: 1 | Status: SHIPPED | OUTPATIENT
Start: 2024-02-26

## 2024-03-14 ENCOUNTER — HOSPITAL ENCOUNTER (OUTPATIENT)
Dept: ULTRASOUND IMAGING | Age: 84
Discharge: HOME | End: 2024-03-14
Attending: SPECIALIST
Payer: MEDICARE

## 2024-03-14 DIAGNOSIS — R31.29 MICROSCOPIC HEMATURIA: ICD-10-CM

## 2024-03-14 PROCEDURE — 76770 US EXAM ABDO BACK WALL COMP: CPT

## 2024-05-14 ENCOUNTER — ANTI-COAG VISIT (OUTPATIENT)
Dept: PHARMACY | Age: 84
End: 2024-05-14
Payer: MEDICARE

## 2024-05-14 PROCEDURE — 36416 COLLJ CAPILLARY BLOOD SPEC: CPT

## 2024-05-14 PROCEDURE — 99211 OFF/OP EST MAY X REQ PHY/QHP: CPT

## 2024-05-14 PROCEDURE — 85610 PROTHROMBIN TIME: CPT

## 2024-05-14 NOTE — PROGRESS NOTES
Medication Management Service  [facility]  [phone]    Visit Date: 5/14/2024   Subjective:       Juwan Hooks is a 83 y.o. male who presents to clinic today for anticoagulation monitoring and adjustment.    Patient seen in clinic for warfarin management due to  Indication:   atrial fibrillation.   INR goal: of 2.0-3.0.  Duration of therapy: indefinite.    Patient reports the following:   Adherent with regimen  Missed or extra doses:  None   Bleeding or thromboembolic side effects:  None  Significant medication changes:  None  Significant dietary changes: None  Significant alcohol or tobacco changes: None  Significant recent illness, disease state changes, or hospitalization:  None  Upcoming surgeries or procedures:  None  Falls: None           Assessment and Plan     PT/INR done in office per protocol.   INR today is 2.7, therapeutic.      Plan:  Will continue current regimen of warfarin 2.5mg daily except 3.5mg Mondays and Fridays.     Recheck INR in 12 week(s).     Patient verbalized understanding of dosing directions and information discussed. Dosing schedule given to patient including phone number, appointment date, and time. Progress note sent to referring office.    Electronically signed by Britt Tobar RPH on 5/14/24    For Pharmacy Admin Tracking Only    Total # of Interventions Recommended: 0  Total # of Interventions Accepted: 0  Time Spent (min): 15

## 2024-06-05 DIAGNOSIS — I48.91 ATRIAL FIBRILLATION, UNSPECIFIED TYPE (HCC): Primary | ICD-10-CM

## 2024-07-29 ENCOUNTER — OFFICE VISIT (OUTPATIENT)
Dept: CARDIOLOGY CLINIC | Age: 84
End: 2024-07-29
Payer: MEDICARE

## 2024-07-29 VITALS
BODY MASS INDEX: 29.4 KG/M2 | DIASTOLIC BLOOD PRESSURE: 76 MMHG | HEART RATE: 77 BPM | HEIGHT: 71 IN | SYSTOLIC BLOOD PRESSURE: 124 MMHG | WEIGHT: 210 LBS

## 2024-07-29 DIAGNOSIS — Z91.81 AT HIGH RISK FOR INJURY RELATED TO FALL: ICD-10-CM

## 2024-07-29 DIAGNOSIS — E78.49 OTHER HYPERLIPIDEMIA: ICD-10-CM

## 2024-07-29 DIAGNOSIS — I10 PRIMARY HYPERTENSION: ICD-10-CM

## 2024-07-29 DIAGNOSIS — I48.21 PERMANENT ATRIAL FIBRILLATION (HCC): Primary | ICD-10-CM

## 2024-07-29 PROBLEM — I48.92 ATRIAL FLUTTER (HCC): Status: ACTIVE | Noted: 2024-07-29

## 2024-07-29 PROCEDURE — G8427 DOCREV CUR MEDS BY ELIG CLIN: HCPCS | Performed by: INTERNAL MEDICINE

## 2024-07-29 PROCEDURE — 1123F ACP DISCUSS/DSCN MKR DOCD: CPT | Performed by: INTERNAL MEDICINE

## 2024-07-29 PROCEDURE — 3078F DIAST BP <80 MM HG: CPT | Performed by: INTERNAL MEDICINE

## 2024-07-29 PROCEDURE — 3074F SYST BP LT 130 MM HG: CPT | Performed by: INTERNAL MEDICINE

## 2024-07-29 PROCEDURE — 93000 ELECTROCARDIOGRAM COMPLETE: CPT | Performed by: INTERNAL MEDICINE

## 2024-07-29 PROCEDURE — 99214 OFFICE O/P EST MOD 30 MIN: CPT | Performed by: INTERNAL MEDICINE

## 2024-07-29 PROCEDURE — 1036F TOBACCO NON-USER: CPT | Performed by: INTERNAL MEDICINE

## 2024-07-29 PROCEDURE — G8417 CALC BMI ABV UP PARAM F/U: HCPCS | Performed by: INTERNAL MEDICINE

## 2024-07-29 RX ORDER — SENNA AND DOCUSATE SODIUM 50; 8.6 MG/1; MG/1
1 TABLET, FILM COATED ORAL DAILY
COMMUNITY

## 2024-07-29 NOTE — PATIENT INSTRUCTIONS
Continue current cardiovascular medications which have been reviewed and discussed individually with you.   Follows up with coumadin clinic.   Appropriate prescriptions if needed on this visit are addressed. After visit summery is provided.   Questions answered and patient verbalizes understanding. Follow up in 6 months,  sooner if needed.

## 2024-07-29 NOTE — ASSESSMENT & PLAN NOTE
Rate is well-controlled on metoprolol 50 twice daily and is anticoagulated with warfarin with a therapeutic INR of 2.7.  He does bruise easily and follows up with Coumadin clinic.  We did discuss the alternative to long-term anticoagulation therapy if he decides to go for it especially if he is not tolerating warfarin and not able to drive to Coumadin clinic and any increased falling or unsteadiness in the gait he will benefit from left atrial appendage occlusion with watchman so he could be off of long-term anticoagulation therapy.  Information is given he was reviewed and he verbalized understanding.

## 2024-07-29 NOTE — PROGRESS NOTES
Juwan Hooks  1940  Carlos De Los Santos MD      Chief Complaint   Patient presents with    Atrial Fibrillation    Hypertension    Follow-up     6 month follow up  Denies any chest pain, SOB dizziness, swelling or palpitations     Chief complaint and HPI:  Juwan Hooks  is a 83 y.o. male following up for permanent atrial fibrillation and has hypertension and hyperlipidemia and history of chronic venous insufficiency.  Denies any new cardiac symptoms.  Denies any palpitations syncope or near syncope.  He is being followed at Coumadin clinic every 3 months for PT/INR regulation because his PT/INR has been very stable and he cannot drive that well and he is trying to minimize driving.  Denies any falls.  Denies any blood in stool.  He is compliant to his medications.    Rest of the Cardiovascular system review is otherwise unchanged from prior encounter.  Past medical history:  has a past medical history of At high risk for injury related to fall, Atrial flutter (HCC), H/O Doppler ultrasound, H/O echocardiogram, H/O myocardial perfusion scan, Hypertension, Varicose veins, and Venous insufficiency.  Past surgical history:  has a past surgical history that includes Cholecystectomy; hernia repair; Tooth Extraction; Cataract removal; Hemorrhoid surgery; Prostate Cryoablation (2016); cyst removal; Cardioversion (3/6/2012); and Colonoscopy (N/A, 2020).  Social History:   Social History     Tobacco Use    Smoking status: Former     Current packs/day: 0.00     Types: Cigarettes     Start date: 1960     Quit date: 2000     Years since quittin.5    Smokeless tobacco: Never    Tobacco comments:     smoked pipe   Substance Use Topics    Alcohol use: No     Family history: family history is not on file.  ALLERGIES:  Latex, Dicyclomine, and Neosporin [bacitracin-neomycin-polymyxin]    Prior to Admission medications    Medication Sig Start Date End Date Taking? Authorizing Provider   [facility]

## 2024-08-05 RX ORDER — WARFARIN SODIUM 2.5 MG/1
TABLET ORAL
Qty: 90 TABLET | Refills: 1 | Status: SHIPPED | OUTPATIENT
Start: 2024-08-05

## 2024-08-06 ENCOUNTER — ANTI-COAG VISIT (OUTPATIENT)
Dept: PHARMACY | Age: 84
End: 2024-08-06
Payer: MEDICARE

## 2024-08-06 PROCEDURE — 99211 OFF/OP EST MAY X REQ PHY/QHP: CPT

## 2024-08-06 PROCEDURE — 85610 PROTHROMBIN TIME: CPT

## 2024-08-06 NOTE — PROGRESS NOTES
Medication Management Service  [facility]  [phone]    Visit Date: 8/6/2024   Subjective:       Juwan Hooks is a 83 y.o. male who presents to clinic today for anticoagulation monitoring and adjustment.    Patient seen in clinic for warfarin management due to  Indication:   atrial fibrillation.   INR goal: of 2.0-3.0.  Duration of therapy: indefinite.    Patient reports the following:   Adherent with regimen  Missed or extra doses:  None   Bleeding or thromboembolic side effects:  None  Significant medication changes:  None  Significant dietary changes: None  Significant alcohol or tobacco changes: None  Significant recent illness, disease state changes, or hospitalization:  None  Upcoming surgeries or procedures:  None  Falls: None           Assessment and Plan     PT/INR done in office per protocol.   INR today is 2, therapeutic.      Plan:  Will continue current regimen of warfarin 2.5mg daily except 3.5mg Mondays and Fridays.     Recheck INR in 12 week(s).     Patient verbalized understanding of dosing directions and information discussed. Dosing schedule given to patient including phone number, appointment date, and time. Progress note sent to referring office.    Electronically signed by Britt Tobar RPH on 8/6/24    For Pharmacy Admin Tracking Only    Total # of Interventions Recommended: 0  Total # of Interventions Accepted: 0  Time Spent (min): 15

## 2024-10-29 ENCOUNTER — ANTI-COAG VISIT (OUTPATIENT)
Dept: PHARMACY | Age: 84
End: 2024-10-29
Payer: MEDICARE

## 2024-10-29 LAB
INTERNATIONAL NORMALIZATION RATIO, POC: 2.1
POC INR: 2.1 (ref 0.9–1.2)
PROTHROMBIN TIME, POC: 24.6
PROTHROMBIN TIME, POC: 24.6 SEC (ref 10–14.3)

## 2024-10-29 PROCEDURE — 85610 PROTHROMBIN TIME: CPT

## 2024-10-29 PROCEDURE — 99211 OFF/OP EST MAY X REQ PHY/QHP: CPT

## 2024-11-08 ENCOUNTER — HOSPITAL ENCOUNTER (OUTPATIENT)
Age: 84
Discharge: HOME | End: 2024-11-08
Payer: MEDICARE

## 2024-11-08 PROCEDURE — 87086 URINE CULTURE/COLONY COUNT: CPT

## 2024-11-08 PROCEDURE — 87186 SC STD MICRODIL/AGAR DIL: CPT

## 2024-11-08 PROCEDURE — 87077 CULTURE AEROBIC IDENTIFY: CPT

## 2024-11-10 LAB
MICROORGANISM SPEC CULT: ABNORMAL
SPECIMEN DESCRIPTION: ABNORMAL

## 2024-12-13 ENCOUNTER — TELEPHONE (OUTPATIENT)
Dept: PHARMACY | Age: 84
End: 2024-12-13

## 2024-12-13 NOTE — TELEPHONE ENCOUNTER
Patient left  stating he was started on doxycycline. May cause minor INR elevation. No changes need to be made to warfarin.    Returned call. Left .    For Pharmacy Admin Tracking Only    Time Spent (min): 5

## 2025-01-21 ENCOUNTER — ANTI-COAG VISIT (OUTPATIENT)
Dept: PHARMACY | Age: 85
End: 2025-01-21
Payer: MEDICARE

## 2025-01-21 LAB
INTERNATIONAL NORMALIZATION RATIO, POC: 1.8
POC INR: 1.8 (ref 0.9–1.2)
PROTHROMBIN TIME, POC: 21.8
PROTHROMBIN TIME, POC: 21.8 SEC (ref 10–14.3)

## 2025-01-21 PROCEDURE — 99213 OFFICE O/P EST LOW 20 MIN: CPT

## 2025-01-21 PROCEDURE — 85610 PROTHROMBIN TIME: CPT

## 2025-01-21 RX ORDER — WARFARIN SODIUM 2.5 MG/1
TABLET ORAL
Qty: 90 TABLET | Refills: 1 | Status: SHIPPED | OUTPATIENT
Start: 2025-01-21

## 2025-01-21 RX ORDER — WARFARIN SODIUM 1 MG/1
TABLET ORAL
Qty: 40 TABLET | Refills: 1 | Status: SHIPPED | OUTPATIENT
Start: 2025-01-21

## 2025-01-21 NOTE — PROGRESS NOTES
Medication Management Service  [facility]  [phone]    Visit Date: 1/21/2025   Subjective:       Juwan Hooks is a 84 y.o. male who presents to clinic today for anticoagulation monitoring and adjustment.    Patient seen in clinic for warfarin management due to  Indication:   atrial fibrillation.   INR goal: of 2.0-3.0.  Duration of therapy: indefinite.    Patient reports the following:   Adherent with regimen  Missed or extra doses:  None   Bleeding or thromboembolic side effects:  None  Significant medication changes:  None  Significant dietary changes: None  Significant alcohol or tobacco changes: None  Significant recent illness, disease state changes, or hospitalization:  None  Upcoming surgeries or procedures:  None  Falls: None           Assessment and Plan     PT/INR done in office per protocol.   INR today is 1.8, slightly below goal.      Plan:  take 5mg x1 then continue current regimen of warfarin 2.5mg daily except 3.5mg every Mondays and Fridays.     Recheck INR in 8 week(s).     E-Rx sent to [facility] for warfarin 2.5mg tablets #90 with 1 refill and warfarin 1mg tablets #40 with 1 refill    Patient verbalized understanding of dosing directions and information discussed. Dosing schedule given to patient including phone number, appointment date, and time. Progress note sent to referring office.    Electronically signed by Charline Fung RPH on 1/21/25    For Pharmacy Admin Tracking Only    Intervention Detail: Dose Adjustment: 1, reason: Therapy Optimization and Refill(s) Provided  Total # of Interventions Recommended: 2  Total # of Interventions Accepted: 2  Time Spent (min): 15

## 2025-01-24 ENCOUNTER — TELEPHONE (OUTPATIENT)
Dept: PHARMACY | Age: 85
End: 2025-01-24

## 2025-01-24 ENCOUNTER — HOSPITAL ENCOUNTER (EMERGENCY)
Age: 85
Discharge: HOME | End: 2025-01-24
Attending: EMERGENCY MEDICINE
Payer: MEDICARE

## 2025-01-24 VITALS
BODY MASS INDEX: 28.11 KG/M2 | RESPIRATION RATE: 18 BRPM | SYSTOLIC BLOOD PRESSURE: 125 MMHG | WEIGHT: 200.8 LBS | HEART RATE: 94 BPM | OXYGEN SATURATION: 96 % | HEIGHT: 71 IN | DIASTOLIC BLOOD PRESSURE: 85 MMHG | TEMPERATURE: 97.4 F

## 2025-01-24 DIAGNOSIS — R31.0 GROSS HEMATURIA: Primary | ICD-10-CM

## 2025-01-24 LAB
ANION GAP SERPL CALCULATED.3IONS-SCNC: 14 MMOL/L (ref 4–16)
BASOPHILS # BLD: 0.04 K/UL
BASOPHILS NFR BLD: 1 % (ref 0–1)
BILIRUB UR QL STRIP: NEGATIVE
BUN SERPL-MCNC: 29 MG/DL (ref 6–23)
CALCIUM SERPL-MCNC: 9.3 MG/DL (ref 8.3–10.6)
CHLORIDE SERPL-SCNC: 101 MMOL/L (ref 99–110)
CLARITY UR: ABNORMAL
CO2 SERPL-SCNC: 24 MMOL/L (ref 21–32)
COLOR UR: ABNORMAL
COMMENT: ABNORMAL
CREAT SERPL-MCNC: 1.2 MG/DL (ref 0.9–1.3)
EOSINOPHIL # BLD: 0.03 K/UL
EOSINOPHILS RELATIVE PERCENT: 1 % (ref 0–3)
ERYTHROCYTE [DISTWIDTH] IN BLOOD BY AUTOMATED COUNT: 12.3 % (ref 11.7–14.9)
GFR, ESTIMATED: 60 ML/MIN/1.73M2
GLUCOSE SERPL-MCNC: 104 MG/DL (ref 74–99)
GLUCOSE UR STRIP-MCNC: NEGATIVE MG/DL
HCT VFR BLD AUTO: 43.6 % (ref 42–52)
HGB BLD-MCNC: 14.6 G/DL (ref 13.5–18)
HGB UR QL STRIP.AUTO: ABNORMAL
IMM GRANULOCYTES # BLD AUTO: 0.01 K/UL
IMM GRANULOCYTES NFR BLD: 0 %
INR PPP: 2
KETONES UR STRIP-MCNC: NEGATIVE MG/DL
LEUKOCYTE ESTERASE UR QL STRIP: NEGATIVE
LYMPHOCYTES NFR BLD: 1.7 K/UL
LYMPHOCYTES RELATIVE PERCENT: 30 % (ref 24–44)
MCH RBC QN AUTO: 33.2 PG (ref 27–31)
MCHC RBC AUTO-ENTMCNC: 33.5 G/DL (ref 32–36)
MCV RBC AUTO: 99.1 FL (ref 78–100)
MONOCYTES NFR BLD: 0.52 K/UL
MONOCYTES NFR BLD: 9 % (ref 0–4)
NEUTROPHILS NFR BLD: 59 % (ref 36–66)
NEUTS SEG NFR BLD: 3.32 K/UL
NITRITE UR QL STRIP: NEGATIVE
PH UR STRIP: 7 [PH] (ref 5–8)
PLATELET # BLD AUTO: 203 K/UL (ref 140–440)
PMV BLD AUTO: 9.3 FL (ref 7.5–11.1)
POTASSIUM SERPL-SCNC: 4.6 MMOL/L (ref 3.5–5.1)
PROT UR STRIP-MCNC: 100 MG/DL
PROTHROMBIN TIME: 23.4 SEC (ref 11.7–14.5)
RBC # BLD AUTO: 4.4 M/UL (ref 4.6–6.2)
RBC #/AREA URNS HPF: ABNORMAL /HPF
SODIUM SERPL-SCNC: 139 MMOL/L (ref 135–145)
SP GR UR STRIP: 1.02 (ref 1–1.03)
UROBILINOGEN UR STRIP-ACNC: 0.2 EU/DL (ref 0.2–1)
WBC #/AREA URNS HPF: ABNORMAL /HPF
WBC OTHER # BLD: 5.6 K/UL (ref 4–10.5)

## 2025-01-24 PROCEDURE — 85610 PROTHROMBIN TIME: CPT

## 2025-01-24 PROCEDURE — 87086 URINE CULTURE/COLONY COUNT: CPT

## 2025-01-24 PROCEDURE — 85025 COMPLETE CBC W/AUTO DIFF WBC: CPT

## 2025-01-24 PROCEDURE — 80048 BASIC METABOLIC PNL TOTAL CA: CPT

## 2025-01-24 PROCEDURE — 81001 URINALYSIS AUTO W/SCOPE: CPT

## 2025-01-24 PROCEDURE — 99283 EMERGENCY DEPT VISIT LOW MDM: CPT

## 2025-01-24 RX ORDER — CEPHALEXIN 500 MG/1
500 CAPSULE ORAL 4 TIMES DAILY
Qty: 40 CAPSULE | Refills: 0 | Status: SHIPPED | OUTPATIENT
Start: 2025-01-24

## 2025-01-24 ASSESSMENT — PAIN DESCRIPTION - FREQUENCY: FREQUENCY: CONTINUOUS

## 2025-01-24 ASSESSMENT — PAIN DESCRIPTION - ORIENTATION: ORIENTATION: LOWER

## 2025-01-24 ASSESSMENT — PAIN DESCRIPTION - PAIN TYPE: TYPE: ACUTE PAIN

## 2025-01-24 ASSESSMENT — PAIN DESCRIPTION - ONSET: ONSET: SUDDEN

## 2025-01-24 ASSESSMENT — LIFESTYLE VARIABLES
HOW MANY STANDARD DRINKS CONTAINING ALCOHOL DO YOU HAVE ON A TYPICAL DAY: PATIENT DOES NOT DRINK
HOW OFTEN DO YOU HAVE A DRINK CONTAINING ALCOHOL: NEVER

## 2025-01-24 ASSESSMENT — PAIN - FUNCTIONAL ASSESSMENT
PAIN_FUNCTIONAL_ASSESSMENT: 0-10
PAIN_FUNCTIONAL_ASSESSMENT: ACTIVITIES ARE NOT PREVENTED

## 2025-01-24 ASSESSMENT — PAIN DESCRIPTION - LOCATION: LOCATION: ABDOMEN

## 2025-01-24 ASSESSMENT — PAIN SCALES - GENERAL: PAINLEVEL_OUTOF10: 2

## 2025-01-24 ASSESSMENT — PAIN DESCRIPTION - DESCRIPTORS: DESCRIPTORS: DISCOMFORT

## 2025-01-24 NOTE — TELEPHONE ENCOUNTER
Patient went to ER- INR was 2.0. Dr starting empirically on Keflex 500mg QID.  Patient will follow up with urologist. Keflex may cause minimal increase in INR.  No changes to warfarin dose necessary.    Will follow for note from Dr Garcia.    For Pharmacy Admin Tracking Only    Time Spent (min): 5

## 2025-01-24 NOTE — DISCHARGE INSTRUCTIONS
Drink plenty of clear liquids to help dilute your urine and flush your bladder.  Follow-up with your urologist as soon as possible.  Return to the ER for any worsening problems or concerns.

## 2025-01-24 NOTE — ED PROVIDER NOTES
tablet by mouth daily      atorvastatin (LIPITOR) 10 MG tablet Take 1 tablet by mouth daily      oxybutynin (DITROPAN-XL) 5 MG extended release tablet Take 2 tablets by mouth every evening      acetaminophen (TYLENOL) 650 MG extended release tablet Take 1 tablet by mouth every 8 hours as needed for Pain      olmesartan (BENICAR) 5 MG tablet Take 1 tablet by mouth 2 times daily      hydrochlorothiazide (HYDRODIURIL) 25 MG tablet Take 1 tablet by mouth daily      metoprolol (LOPRESSOR) 50 MG tablet Take 1 tablet by mouth 2 times daily 60 tablet 6     Allergies   Allergen Reactions    Latex Rash    Dicyclomine     Neosporin [Bacitracin-Neomycin-Polymyxin] Rash     Nursing Notes Reviewed    Physical Exam:  ED Triage Vitals [01/24/25 1202]   Encounter Vitals Group      /85      Systolic BP Percentile       Diastolic BP Percentile       Pulse 94      Respirations 18      Temp 97.4 °F (36.3 °C)      Temp Source Oral      SpO2 96 %      Weight - Scale 91.1 kg (200 lb 12.8 oz)      Height 1.803 m (5' 11\")      Head Circumference       Peak Flow       Pain Score       Pain Loc       Pain Education       Exclude from Growth Chart      GENERAL APPEARANCE: Awake and alert. Cooperative. No acute distress.  Nontoxic in appearance  HEAD: Normocephalic. Atraumatic.  EYES: Sclera anicteric.  ENT: Tolerates saliva.  NECK: Supple. Trachea midline.  LUNGS: Respirations unlabored.  Abdomen: Soft nontender no guarding or rebound  EXTREMITIES: No acute deformities.  SKIN: Warm and dry.  NEUROLOGICAL: No gross facial drooping.  PSYCHIATRIC: Normal mood.    Labs:  Results for orders placed or performed during the hospital encounter of 01/24/25   UA w/Reflex Culture    Specimen: Urine   Result Value Ref Range    Color, UA Red (A) Yellow    Turbidity UA Cloudy (A) Clear    Glucose, Ur NEGATIVE NEGATIVE mg/dL    Bilirubin, Urine NEGATIVE NEGATIVE    Ketones, Urine NEGATIVE NEGATIVE mg/dL    Specific Gravity, UA 1.020 1.005 - 1.030

## 2025-01-24 NOTE — TELEPHONE ENCOUNTER
Patient called to notify clinic he is having bright red urine.     INR MONITORING  Recent Labs     01/21/25  1405 01/21/25  1406   INR 1.8* 1.8     Patient plans to go to ER to be evaluated. Will follow.    For Pharmacy Admin Tracking Only    Time Spent (min): 5

## 2025-01-24 NOTE — TELEPHONE ENCOUNTER
Called patient to patient to know we saw results from ER visit and there is no adjustment to warfarin therapy at this time. Patient sees Urologist on 1/30.    For Pharmacy Admin Tracking Only    Time Spent (min): 5

## 2025-01-25 LAB
MICROORGANISM SPEC CULT: NORMAL
SERVICE CMNT-IMP: NORMAL
SPECIMEN DESCRIPTION: NORMAL

## 2025-02-03 ENCOUNTER — OFFICE VISIT (OUTPATIENT)
Dept: CARDIOLOGY CLINIC | Age: 85
End: 2025-02-03
Payer: MEDICARE

## 2025-02-03 VITALS
HEIGHT: 71 IN | DIASTOLIC BLOOD PRESSURE: 58 MMHG | WEIGHT: 201 LBS | BODY MASS INDEX: 28.14 KG/M2 | HEART RATE: 67 BPM | SYSTOLIC BLOOD PRESSURE: 114 MMHG

## 2025-02-03 DIAGNOSIS — Z91.81 AT HIGH RISK FOR INJURY RELATED TO FALL: ICD-10-CM

## 2025-02-03 DIAGNOSIS — I48.21 PERMANENT ATRIAL FIBRILLATION (HCC): Primary | ICD-10-CM

## 2025-02-03 DIAGNOSIS — I10 PRIMARY HYPERTENSION: ICD-10-CM

## 2025-02-03 DIAGNOSIS — E78.49 OTHER HYPERLIPIDEMIA: ICD-10-CM

## 2025-02-03 PROCEDURE — 1159F MED LIST DOCD IN RCRD: CPT | Performed by: INTERNAL MEDICINE

## 2025-02-03 PROCEDURE — 1036F TOBACCO NON-USER: CPT | Performed by: INTERNAL MEDICINE

## 2025-02-03 PROCEDURE — 3078F DIAST BP <80 MM HG: CPT | Performed by: INTERNAL MEDICINE

## 2025-02-03 PROCEDURE — G8427 DOCREV CUR MEDS BY ELIG CLIN: HCPCS | Performed by: INTERNAL MEDICINE

## 2025-02-03 PROCEDURE — 3074F SYST BP LT 130 MM HG: CPT | Performed by: INTERNAL MEDICINE

## 2025-02-03 PROCEDURE — 99214 OFFICE O/P EST MOD 30 MIN: CPT | Performed by: INTERNAL MEDICINE

## 2025-02-03 PROCEDURE — 1123F ACP DISCUSS/DSCN MKR DOCD: CPT | Performed by: INTERNAL MEDICINE

## 2025-02-03 PROCEDURE — G8417 CALC BMI ABV UP PARAM F/U: HCPCS | Performed by: INTERNAL MEDICINE

## 2025-02-03 NOTE — PATIENT INSTRUCTIONS
Continue current cardiovascular medications which have been reviewed and discussed individually with you.  Appropriate prescriptions if needed on this visit are addressed. After visit summery is provided.   Questions answered and patient verbalizes understanding. Follow up in 6 months,  sooner if needed.

## 2025-02-03 NOTE — PROGRESS NOTES
answered and patient verbalizes understanding. Follow up in 6 months,  sooner if needed.    Ratna Youssef MD, 2/3/2025 11:40 AM     Please note this report has been partially produced using speech recognition software and may contain errors related to that system including errors in grammar, punctuation, and spelling, as well as words and phrases that may be inappropriate. If there are any questions or concerns please feel free to contact the dictating provider for clarification.

## 2025-02-03 NOTE — ASSESSMENT & PLAN NOTE
Well-controlled on Benicar HydroDIURIL and Lopressor continue with all. Well-controlled on Benicar HydroDIURIL and Lopressor continue with all.

## 2025-02-04 ENCOUNTER — TRANSCRIBE ORDERS (OUTPATIENT)
Dept: ADMINISTRATIVE | Age: 85
End: 2025-02-04

## 2025-02-04 DIAGNOSIS — R31.0 GROSS HEMATURIA: Primary | ICD-10-CM

## 2025-03-09 ENCOUNTER — HOSPITAL ENCOUNTER (INPATIENT)
Age: 85
LOS: 2 days | Discharge: HOME | DRG: 871 | End: 2025-03-12
Attending: EMERGENCY MEDICINE | Admitting: INTERNAL MEDICINE
Payer: MEDICARE

## 2025-03-09 ENCOUNTER — APPOINTMENT (OUTPATIENT)
Dept: CT IMAGING | Age: 85
DRG: 871 | End: 2025-03-09
Payer: MEDICARE

## 2025-03-09 ENCOUNTER — APPOINTMENT (OUTPATIENT)
Dept: GENERAL RADIOLOGY | Age: 85
DRG: 871 | End: 2025-03-09
Payer: MEDICARE

## 2025-03-09 DIAGNOSIS — R31.9 URINARY TRACT INFECTION WITH HEMATURIA, SITE UNSPECIFIED: ICD-10-CM

## 2025-03-09 DIAGNOSIS — N39.0 URINARY TRACT INFECTION WITH HEMATURIA, SITE UNSPECIFIED: ICD-10-CM

## 2025-03-09 DIAGNOSIS — R41.82 ALTERED MENTAL STATUS, UNSPECIFIED ALTERED MENTAL STATUS TYPE: ICD-10-CM

## 2025-03-09 DIAGNOSIS — A41.9 SEPSIS, DUE TO UNSPECIFIED ORGANISM, UNSPECIFIED WHETHER ACUTE ORGAN DYSFUNCTION PRESENT (HCC): Primary | ICD-10-CM

## 2025-03-09 LAB
ALBUMIN SERPL-MCNC: 4.4 G/DL (ref 3.4–5)
ALBUMIN/GLOB SERPL: 1.6 {RATIO} (ref 1.1–2.2)
ALP SERPL-CCNC: 110 U/L (ref 40–129)
ALT SERPL-CCNC: 19 U/L (ref 10–40)
ANION GAP SERPL CALCULATED.3IONS-SCNC: 13 MMOL/L (ref 4–16)
AST SERPL-CCNC: 22 U/L (ref 15–37)
BACTERIA URNS QL MICRO: ABNORMAL
BASOPHILS # BLD: 0.03 K/UL
BASOPHILS NFR BLD: 0 % (ref 0–1)
BILIRUB SERPL-MCNC: 1 MG/DL (ref 0–1)
BILIRUB UR QL STRIP: NEGATIVE
BUN SERPL-MCNC: 27 MG/DL (ref 6–23)
CALCIUM SERPL-MCNC: 9.5 MG/DL (ref 8.3–10.6)
CHLORIDE SERPL-SCNC: 96 MMOL/L (ref 99–110)
CLARITY UR: ABNORMAL
CO2 SERPL-SCNC: 25 MMOL/L (ref 21–32)
COLOR UR: YELLOW
CREAT SERPL-MCNC: 1.2 MG/DL (ref 0.9–1.3)
EOSINOPHIL # BLD: 0 K/UL
EOSINOPHILS RELATIVE PERCENT: 0 % (ref 0–3)
EPI CELLS #/AREA URNS HPF: ABNORMAL /HPF
ERYTHROCYTE [DISTWIDTH] IN BLOOD BY AUTOMATED COUNT: 12.5 % (ref 11.7–14.9)
GFR, ESTIMATED: 60 ML/MIN/1.73M2
GLUCOSE SERPL-MCNC: 115 MG/DL (ref 74–99)
GLUCOSE UR STRIP-MCNC: NEGATIVE MG/DL
HCT VFR BLD AUTO: 41.6 % (ref 42–52)
HGB BLD-MCNC: 14 G/DL (ref 13.5–18)
HGB UR QL STRIP.AUTO: ABNORMAL
IMM GRANULOCYTES # BLD AUTO: 0.05 K/UL
IMM GRANULOCYTES NFR BLD: 0 %
KETONES UR STRIP-MCNC: NEGATIVE MG/DL
LACTATE BLDV-SCNC: 2 MMOL/L (ref 0.4–2)
LEUKOCYTE ESTERASE UR QL STRIP: ABNORMAL
LYMPHOCYTES NFR BLD: 1.67 K/UL
LYMPHOCYTES RELATIVE PERCENT: 12 % (ref 24–44)
MCH RBC QN AUTO: 33.6 PG (ref 27–31)
MCHC RBC AUTO-ENTMCNC: 33.7 G/DL (ref 32–36)
MCV RBC AUTO: 99.8 FL (ref 78–100)
MONOCYTES NFR BLD: 0.82 K/UL
MONOCYTES NFR BLD: 6 % (ref 0–4)
NEUTROPHILS NFR BLD: 82 % (ref 36–66)
NEUTS SEG NFR BLD: 11.61 K/UL
NITRITE UR QL STRIP: POSITIVE
PH UR STRIP: 6 [PH] (ref 5–8)
PLATELET # BLD AUTO: 184 K/UL (ref 140–440)
PMV BLD AUTO: 9.1 FL (ref 7.5–11.1)
POTASSIUM SERPL-SCNC: 4 MMOL/L (ref 3.5–5.1)
PROT SERPL-MCNC: 7.2 G/DL (ref 6.4–8.2)
PROT UR STRIP-MCNC: 30 MG/DL
RBC # BLD AUTO: 4.17 M/UL (ref 4.6–6.2)
RBC #/AREA URNS HPF: ABNORMAL /HPF
SODIUM SERPL-SCNC: 134 MMOL/L (ref 135–145)
SP GR UR STRIP: 1.02 (ref 1–1.03)
UROBILINOGEN UR STRIP-ACNC: 0.2 EU/DL (ref 0.2–1)
WBC #/AREA URNS HPF: ABNORMAL /HPF
WBC OTHER # BLD: 14.2 K/UL (ref 4–10.5)

## 2025-03-09 PROCEDURE — 2500000003 HC RX 250 WO HCPCS: Performed by: EMERGENCY MEDICINE

## 2025-03-09 PROCEDURE — 74176 CT ABD & PELVIS W/O CONTRAST: CPT

## 2025-03-09 PROCEDURE — 99285 EMERGENCY DEPT VISIT HI MDM: CPT

## 2025-03-09 PROCEDURE — 87088 URINE BACTERIA CULTURE: CPT

## 2025-03-09 PROCEDURE — 87086 URINE CULTURE/COLONY COUNT: CPT

## 2025-03-09 PROCEDURE — 71045 X-RAY EXAM CHEST 1 VIEW: CPT

## 2025-03-09 PROCEDURE — 6370000000 HC RX 637 (ALT 250 FOR IP): Performed by: EMERGENCY MEDICINE

## 2025-03-09 PROCEDURE — 6360000002 HC RX W HCPCS: Performed by: EMERGENCY MEDICINE

## 2025-03-09 PROCEDURE — 96361 HYDRATE IV INFUSION ADD-ON: CPT

## 2025-03-09 PROCEDURE — 80053 COMPREHEN METABOLIC PANEL: CPT

## 2025-03-09 PROCEDURE — 87040 BLOOD CULTURE FOR BACTERIA: CPT

## 2025-03-09 PROCEDURE — 70450 CT HEAD/BRAIN W/O DYE: CPT

## 2025-03-09 PROCEDURE — 83605 ASSAY OF LACTIC ACID: CPT

## 2025-03-09 PROCEDURE — 96374 THER/PROPH/DIAG INJ IV PUSH: CPT

## 2025-03-09 PROCEDURE — 87186 SC STD MICRODIL/AGAR DIL: CPT

## 2025-03-09 PROCEDURE — 2580000003 HC RX 258: Performed by: EMERGENCY MEDICINE

## 2025-03-09 PROCEDURE — 85025 COMPLETE CBC W/AUTO DIFF WBC: CPT

## 2025-03-09 PROCEDURE — 81001 URINALYSIS AUTO W/SCOPE: CPT

## 2025-03-09 RX ORDER — ACETAMINOPHEN 325 MG/1
650 TABLET ORAL ONCE
Status: COMPLETED | OUTPATIENT
Start: 2025-03-09 | End: 2025-03-09

## 2025-03-09 RX ORDER — 0.9 % SODIUM CHLORIDE 0.9 %
1000 INTRAVENOUS SOLUTION INTRAVENOUS ONCE
Status: COMPLETED | OUTPATIENT
Start: 2025-03-09 | End: 2025-03-10

## 2025-03-09 RX ORDER — 0.9 % SODIUM CHLORIDE 0.9 %
500 INTRAVENOUS SOLUTION INTRAVENOUS ONCE
Status: COMPLETED | OUTPATIENT
Start: 2025-03-09 | End: 2025-03-10

## 2025-03-09 RX ADMIN — SODIUM CHLORIDE 1000 ML: 9 INJECTION, SOLUTION INTRAVENOUS at 23:42

## 2025-03-09 RX ADMIN — ACETAMINOPHEN 650 MG: 325 TABLET ORAL at 22:15

## 2025-03-09 RX ADMIN — WATER 1000 MG: 1 INJECTION INTRAMUSCULAR; INTRAVENOUS; SUBCUTANEOUS at 23:03

## 2025-03-09 RX ADMIN — SODIUM CHLORIDE 500 ML: 9 INJECTION, SOLUTION INTRAVENOUS at 23:03

## 2025-03-09 ASSESSMENT — PAIN DESCRIPTION - LOCATION
LOCATION: BACK
LOCATION: BACK

## 2025-03-09 ASSESSMENT — PAIN SCALES - GENERAL
PAINLEVEL_OUTOF10: 4
PAINLEVEL_OUTOF10: 3

## 2025-03-09 ASSESSMENT — PAIN - FUNCTIONAL ASSESSMENT: PAIN_FUNCTIONAL_ASSESSMENT: 0-10

## 2025-03-10 PROBLEM — A41.9 SEPSIS (HCC): Status: ACTIVE | Noted: 2025-03-10

## 2025-03-10 LAB
ANION GAP SERPL CALCULATED.3IONS-SCNC: ABNORMAL MMOL/L (ref 4–16)
BASOPHILS # BLD: 0.02 K/UL
BASOPHILS NFR BLD: 0 % (ref 0–1)
BUN SERPL-MCNC: 22 MG/DL (ref 6–23)
CALCIUM SERPL-MCNC: 8.6 MG/DL (ref 8.3–10.6)
CHLORIDE SERPL-SCNC: 98 MMOL/L (ref 99–110)
CO2 SERPL-SCNC: 38 MMOL/L (ref 21–32)
CREAT SERPL-MCNC: 1.1 MG/DL (ref 0.9–1.3)
EKG DIAGNOSIS: NORMAL
EKG Q-T INTERVAL: 380 MS
EKG QRS DURATION: 140 MS
EKG QTC CALCULATION (BAZETT): 499 MS
EKG R AXIS: 66 DEGREES
EKG T AXIS: -39 DEGREES
EKG VENTRICULAR RATE: 104 BPM
EOSINOPHIL # BLD: 0 K/UL
EOSINOPHILS RELATIVE PERCENT: 0 % (ref 0–3)
ERYTHROCYTE [DISTWIDTH] IN BLOOD BY AUTOMATED COUNT: 12.6 % (ref 11.7–14.9)
ERYTHROCYTE [SEDIMENTATION RATE] IN BLOOD BY WESTERGREN METHOD: 3 MM/HR (ref 0–20)
GFR, ESTIMATED: 66 ML/MIN/1.73M2
GLUCOSE SERPL-MCNC: 103 MG/DL (ref 74–99)
HCT VFR BLD AUTO: 38.2 % (ref 42–52)
HGB BLD-MCNC: 12.7 G/DL (ref 13.5–18)
IMM GRANULOCYTES # BLD AUTO: 0.18 K/UL
IMM GRANULOCYTES NFR BLD: 1 %
INR PPP: 2.1
LACTATE BLDV-SCNC: 2.2 MMOL/L (ref 0.4–2)
LACTATE BLDV-SCNC: 2.9 MMOL/L (ref 0.4–2)
LYMPHOCYTES NFR BLD: 1.8 K/UL
LYMPHOCYTES RELATIVE PERCENT: 10 % (ref 24–44)
MCH RBC QN AUTO: 33.3 PG (ref 27–31)
MCHC RBC AUTO-ENTMCNC: 33.2 G/DL (ref 32–36)
MCV RBC AUTO: 100.3 FL (ref 78–100)
MONOCYTES NFR BLD: 1.34 K/UL
MONOCYTES NFR BLD: 8 % (ref 0–4)
NEUTROPHILS NFR BLD: 81 % (ref 36–66)
NEUTS SEG NFR BLD: 14.62 K/UL
PLATELET # BLD AUTO: 157 K/UL (ref 140–440)
PMV BLD AUTO: 9 FL (ref 7.5–11.1)
POTASSIUM SERPL-SCNC: 3.8 MMOL/L (ref 3.5–5.1)
PROTHROMBIN TIME: 24.4 SEC (ref 11.7–14.5)
RBC # BLD AUTO: 3.81 M/UL (ref 4.6–6.2)
SODIUM SERPL-SCNC: 134 MMOL/L (ref 135–145)
WBC OTHER # BLD: 18 K/UL (ref 4–10.5)

## 2025-03-10 PROCEDURE — 93005 ELECTROCARDIOGRAM TRACING: CPT

## 2025-03-10 PROCEDURE — 83615 LACTATE (LD) (LDH) ENZYME: CPT

## 2025-03-10 PROCEDURE — 6360000002 HC RX W HCPCS

## 2025-03-10 PROCEDURE — 51798 US URINE CAPACITY MEASURE: CPT

## 2025-03-10 PROCEDURE — 86140 C-REACTIVE PROTEIN: CPT

## 2025-03-10 PROCEDURE — 36415 COLL VENOUS BLD VENIPUNCTURE: CPT

## 2025-03-10 PROCEDURE — 97162 PT EVAL MOD COMPLEX 30 MIN: CPT

## 2025-03-10 PROCEDURE — 97530 THERAPEUTIC ACTIVITIES: CPT

## 2025-03-10 PROCEDURE — 6370000000 HC RX 637 (ALT 250 FOR IP): Performed by: INTERNAL MEDICINE

## 2025-03-10 PROCEDURE — 80048 BASIC METABOLIC PNL TOTAL CA: CPT

## 2025-03-10 PROCEDURE — 6370000000 HC RX 637 (ALT 250 FOR IP)

## 2025-03-10 PROCEDURE — 97166 OT EVAL MOD COMPLEX 45 MIN: CPT

## 2025-03-10 PROCEDURE — 85025 COMPLETE CBC W/AUTO DIFF WBC: CPT

## 2025-03-10 PROCEDURE — 94761 N-INVAS EAR/PLS OXIMETRY MLT: CPT

## 2025-03-10 PROCEDURE — 93010 ELECTROCARDIOGRAM REPORT: CPT | Performed by: INTERNAL MEDICINE

## 2025-03-10 PROCEDURE — 85610 PROTHROMBIN TIME: CPT

## 2025-03-10 PROCEDURE — 1200000000 HC SEMI PRIVATE

## 2025-03-10 PROCEDURE — 2500000003 HC RX 250 WO HCPCS

## 2025-03-10 PROCEDURE — 97116 GAIT TRAINING THERAPY: CPT

## 2025-03-10 PROCEDURE — 85652 RBC SED RATE AUTOMATED: CPT

## 2025-03-10 PROCEDURE — 2580000003 HC RX 258

## 2025-03-10 PROCEDURE — 83605 ASSAY OF LACTIC ACID: CPT

## 2025-03-10 RX ORDER — METOPROLOL TARTRATE 50 MG
50 TABLET ORAL 2 TIMES DAILY
Status: DISCONTINUED | OUTPATIENT
Start: 2025-03-10 | End: 2025-03-12 | Stop reason: HOSPADM

## 2025-03-10 RX ORDER — SODIUM CHLORIDE 0.9 % (FLUSH) 0.9 %
5-40 SYRINGE (ML) INJECTION PRN
Status: DISCONTINUED | OUTPATIENT
Start: 2025-03-10 | End: 2025-03-12 | Stop reason: HOSPADM

## 2025-03-10 RX ORDER — ACETAMINOPHEN 325 MG/1
650 TABLET ORAL EVERY 6 HOURS PRN
Status: DISCONTINUED | OUTPATIENT
Start: 2025-03-10 | End: 2025-03-12 | Stop reason: HOSPADM

## 2025-03-10 RX ORDER — OXYBUTYNIN CHLORIDE 10 MG/1
10 TABLET, EXTENDED RELEASE ORAL EVERY EVENING
Status: DISCONTINUED | OUTPATIENT
Start: 2025-03-10 | End: 2025-03-12 | Stop reason: HOSPADM

## 2025-03-10 RX ORDER — ACETAMINOPHEN 650 MG/1
650 SUPPOSITORY RECTAL EVERY 6 HOURS PRN
Status: DISCONTINUED | OUTPATIENT
Start: 2025-03-10 | End: 2025-03-12 | Stop reason: HOSPADM

## 2025-03-10 RX ORDER — SODIUM CHLORIDE 9 MG/ML
INJECTION, SOLUTION INTRAVENOUS PRN
Status: DISCONTINUED | OUTPATIENT
Start: 2025-03-10 | End: 2025-03-12 | Stop reason: HOSPADM

## 2025-03-10 RX ORDER — HYDROCHLOROTHIAZIDE 25 MG/1
25 TABLET ORAL DAILY
Status: DISCONTINUED | OUTPATIENT
Start: 2025-03-10 | End: 2025-03-12

## 2025-03-10 RX ORDER — SODIUM CHLORIDE 0.9 % (FLUSH) 0.9 %
5-40 SYRINGE (ML) INJECTION EVERY 12 HOURS SCHEDULED
Status: DISCONTINUED | OUTPATIENT
Start: 2025-03-10 | End: 2025-03-12 | Stop reason: HOSPADM

## 2025-03-10 RX ORDER — ATORVASTATIN CALCIUM 10 MG/1
10 TABLET, FILM COATED ORAL NIGHTLY
Status: DISCONTINUED | OUTPATIENT
Start: 2025-03-10 | End: 2025-03-12 | Stop reason: HOSPADM

## 2025-03-10 RX ORDER — WARFARIN SODIUM 2.5 MG/1
2.5 TABLET ORAL
Status: DISCONTINUED | OUTPATIENT
Start: 2025-03-11 | End: 2025-03-11

## 2025-03-10 RX ORDER — SODIUM CHLORIDE, SODIUM LACTATE, POTASSIUM CHLORIDE, CALCIUM CHLORIDE 600; 310; 30; 20 MG/100ML; MG/100ML; MG/100ML; MG/100ML
INJECTION, SOLUTION INTRAVENOUS CONTINUOUS
Status: DISPENSED | OUTPATIENT
Start: 2025-03-10 | End: 2025-03-12

## 2025-03-10 RX ORDER — LOSARTAN POTASSIUM 25 MG/1
12.5 TABLET ORAL EVERY 12 HOURS
Status: DISCONTINUED | OUTPATIENT
Start: 2025-03-10 | End: 2025-03-12

## 2025-03-10 RX ORDER — SODIUM CHLORIDE 9 MG/ML
750 INJECTION, SOLUTION INTRAVENOUS ONCE
Status: COMPLETED | OUTPATIENT
Start: 2025-03-10 | End: 2025-03-10

## 2025-03-10 RX ORDER — POLYETHYLENE GLYCOL 3350 17 G/17G
17 POWDER, FOR SOLUTION ORAL DAILY
Status: DISCONTINUED | OUTPATIENT
Start: 2025-03-10 | End: 2025-03-12 | Stop reason: HOSPADM

## 2025-03-10 RX ORDER — SODIUM CHLORIDE, SODIUM LACTATE, POTASSIUM CHLORIDE, CALCIUM CHLORIDE 600; 310; 30; 20 MG/100ML; MG/100ML; MG/100ML; MG/100ML
1000 INJECTION, SOLUTION INTRAVENOUS CONTINUOUS
Status: DISPENSED | OUTPATIENT
Start: 2025-03-10 | End: 2025-03-10

## 2025-03-10 RX ORDER — SENNA AND DOCUSATE SODIUM 50; 8.6 MG/1; MG/1
1 TABLET, FILM COATED ORAL DAILY
Status: DISCONTINUED | OUTPATIENT
Start: 2025-03-10 | End: 2025-03-12 | Stop reason: HOSPADM

## 2025-03-10 RX ADMIN — METOPROLOL TARTRATE 50 MG: 50 TABLET, FILM COATED ORAL at 01:52

## 2025-03-10 RX ADMIN — ATORVASTATIN CALCIUM 10 MG: 10 TABLET, FILM COATED ORAL at 20:34

## 2025-03-10 RX ADMIN — WARFARIN SODIUM 3.5 MG: 2.5 TABLET ORAL at 18:04

## 2025-03-10 RX ADMIN — SODIUM CHLORIDE 750 ML: 9 INJECTION, SOLUTION INTRAVENOUS at 01:47

## 2025-03-10 RX ADMIN — SENNOSIDES, DOCUSATE SODIUM 1 TABLET: 50; 8.6 TABLET, FILM COATED ORAL at 08:32

## 2025-03-10 RX ADMIN — ACETAMINOPHEN 650 MG: 325 TABLET ORAL at 08:33

## 2025-03-10 RX ADMIN — OXYBUTYNIN CHLORIDE 10 MG: 10 TABLET, EXTENDED RELEASE ORAL at 18:04

## 2025-03-10 RX ADMIN — SODIUM CHLORIDE, POTASSIUM CHLORIDE, SODIUM LACTATE AND CALCIUM CHLORIDE 1000 ML: 600; 310; 30; 20 INJECTION, SOLUTION INTRAVENOUS at 03:40

## 2025-03-10 RX ADMIN — SODIUM CHLORIDE, PRESERVATIVE FREE 10 ML: 5 INJECTION INTRAVENOUS at 20:34

## 2025-03-10 RX ADMIN — WATER 1000 MG: 1 INJECTION INTRAMUSCULAR; INTRAVENOUS; SUBCUTANEOUS at 20:34

## 2025-03-10 RX ADMIN — METOPROLOL TARTRATE 50 MG: 50 TABLET, FILM COATED ORAL at 20:34

## 2025-03-10 RX ADMIN — METOPROLOL TARTRATE 50 MG: 50 TABLET, FILM COATED ORAL at 08:33

## 2025-03-10 RX ADMIN — SODIUM CHLORIDE, POTASSIUM CHLORIDE, SODIUM LACTATE AND CALCIUM CHLORIDE: 600; 310; 30; 20 INJECTION, SOLUTION INTRAVENOUS at 18:45

## 2025-03-10 RX ADMIN — LOSARTAN POTASSIUM 12.5 MG: 25 TABLET, FILM COATED ORAL at 20:34

## 2025-03-10 RX ADMIN — SODIUM CHLORIDE, POTASSIUM CHLORIDE, SODIUM LACTATE AND CALCIUM CHLORIDE: 600; 310; 30; 20 INJECTION, SOLUTION INTRAVENOUS at 01:46

## 2025-03-10 NOTE — PROGRESS NOTES
4 Eyes Skin Assessment     NAME:  Juwan Hooks  YOB: 1940  MEDICAL RECORD NUMBER:  [id number]    The patient is being assessed for  Admission    I agree that at least one RN has performed a thorough Head to Toe Skin Assessment on the patient. ALL assessment sites listed below have been assessed.      Areas assessed by both nurses:    Head, Face, Ears, Shoulders, Back, Chest, Arms, Elbows, Hands, Sacrum. Buttock, Coccyx, Ischium, Legs. Feet and Heels, and Under Medical Devices         Does the Patient have a Wound? No noted wound(s)       Dank Prevention initiated by RN: Yes  Wound Care Orders initiated by RN: No    Pressure Injury (Stage 3,4, Unstageable, DTI, NWPT, and Complex wounds) if present, place Wound referral order by RN under : No    New Ostomies, if present place, Ostomy referral order under : No     Nurse 1 eSignature: Electronically signed by Jacqueline Anand RN on 3/10/25 at 2:02 AM EDT    **SHARE this note so that the co-signing nurse can place an eSignature**    Nurse 2 eSignature: Electronically signed by Barbara Antonio RN on 3/10/25 at 2:02 AM EDT

## 2025-03-10 NOTE — PLAN OF CARE
Problem: Safety - Adult  Goal: Free from fall injury  Flowsheets (Taken 3/10/2025 0205)  Free From Fall Injury:   Instruct family/caregiver on patient safety   Based on caregiver fall risk screen, instruct family/caregiver to ask for assistance with transferring infant if caregiver noted to have fall risk factors     Problem: Pain  Goal: Verbalizes/displays adequate comfort level or baseline comfort level  Flowsheets (Taken 3/10/2025 0206)  Verbalizes/displays adequate comfort level or baseline comfort level:   Encourage patient to monitor pain and request assistance   Administer analgesics based on type and severity of pain and evaluate response   Assess pain using appropriate pain scale   Implement non-pharmacological measures as appropriate and evaluate response   Notify Licensed Independent Practitioner if interventions unsuccessful or patient reports new pain

## 2025-03-10 NOTE — PROGRESS NOTES
Patient seen and examined in the AM. Patient admitted in the AM today by my colleague and I agree with their assessment and plan with the addition to the following:     Patient seen and examined at bedside with daughter present.  Overall, patient feeling much better this morning.  Did note continued fevers and increasing leukocytosis. Vitals stable.  Lactic acid improving.  On exam, nontoxic-appearing, alert and oriented, no CVA tenderness.  Discussed plan of care with patient as well as daughter including continuing IV Rocephin and awaiting culture data.      Electronically signed by Morelia Tineo PA-C on 3/10/2025 at 2:30 PM

## 2025-03-10 NOTE — PROGRESS NOTES
Occupational Therapy  Facility/Department: [facility]  Occupational Therapy Initial Assessment    Name Juwan MICHAEL 1940   MRN [id number]   Date of Service 3/10/2025   Patient Room  017/017-01     Patient Diagnoses The primary encounter diagnosis was Sepsis, due to unspecified organism, unspecified whether acute organ dysfunction present (HCC). Diagnoses of Altered mental status, unspecified altered mental status type and Urinary tract infection with hematuria, site unspecified were also pertinent to this visit.   Past Medical History  has a past medical history of At high risk for injury related to fall, Atrial flutter (HCC), H/O Doppler ultrasound, H/O echocardiogram, H/O myocardial perfusion scan, Hypertension, Varicose veins, and Venous insufficiency.   Past Surgical History  has a past surgical history that includes Cholecystectomy; hernia repair; Tooth Extraction; Cataract removal; Hemorrhoid surgery; Prostate Cryoablation (2016); cyst removal; Cardioversion (3/6/2012); and Colonoscopy (N/A, 2020).       Discharge Recommendations  Continue to assess pending progress, Swingbed, or Home with assist PRN  Equipment: Continue to assess    Assessment  Conditions Requiring Skilled Therapeutic Intervention: Decreased functional mobility, Decreased strength, Decreased endurance, Decreased ADL status, Decreased high level ADLs/IADLs, Decreased posture  Assessment of Evaluation: Patient is a 84 y.o. male who presents to [facility] with Sepsis 2/2 urinary tract infection. Patient presents below baseline functional level and would benefit from continued skilled occupational therapy services to address ADLs, functional transfers, and activity tolerance for general strengthening.   Treatment Diagnosis: Muscle weakness (generalized) and Decreased endurance  Therapy Prognosis: Good  Decision Making: Medium Complexity  Requires OT Follow-Up: Yes  Activity Tolerance: Patient tolerated evaluation without  Score:  16     [24=0% impaired(CH), 23=1-19%(CI), 20-22=20-39%(CJ), 15-19=40-59%(CK), 10-14=60-79%(CL), 7-9=80-99%(CM), 6=100%(CN)]       Goals  Patient Goals:  To return home  Time frame: 1 week or until discharge  Patient will complete functional transfers with mod I using AE PRN and with good safety awareness.  Patient will complete all toilet tasks with mod I  Patient will complete UB ADLs with mod I  Patient will complete LB ADLs with mod I using AE PRN  Pt will tolerate 15+ min of therapeutic activity/ex  to improve functional mobility, strength, AROM,simulated homemaking tasks to facilitate ADL independence in order to return home safely    Education  Given to: Patient  Education provided: Role of therapy, Plan of care, Precautions, Transfer training, and Fall Safety   Education method: Verbal      Therapy Time   Individual Co-Eval   Time In  0931   Time Out  0955   Total Time  24       Signed: ASTRID Pacheco/L [id number],   3/10/2025, 10:18 AM

## 2025-03-10 NOTE — PROGRESS NOTES
PHARMACY ANTICOAGULATION MONITORING SERVICE    Juwan Hooks is a 84 y.o. male on warfarin therapy for atrial fibrillation. Pharmacy consulted by Afshin Doran CNP for monitoring and adjustment of treatment.    Indication for anticoagulation: atrial fibrillation  INR goal: 2-3  Warfarin dose prior to admission: warfarin 2.5mg daily except 3.5mg Mondays and Fridays     Pertinent Laboratory Values   Recent Labs     03/09/25  2207 03/10/25  0535   INR  --  2.1   HGB 14.0 12.7*   HCT 41.6* 38.2*    157     Assessment/Plan:  Drug Interactions: no new interactions  INR therapeutic today at 2.1  Will continue warfarin 2.5mg daily except 3.5mg Mondays and Fridays   Pharmacy will continue to monitor and adjust warfarin therapy as indicated    Thank you for the consult.  Britt Tobar, [facility]  3/10/2025 7:14 AM

## 2025-03-10 NOTE — PROGRESS NOTES
Facility/Department: [facility]  Physical Therapy Initial Assessment    Name Juwan Hooks    1940   MRN [id number]   Date of Service 3/10/2025   Patient Room  017-01     Patient Diagnoses The primary encounter diagnosis was Sepsis, due to unspecified organism, unspecified whether acute organ dysfunction present (HCC). Diagnoses of Altered mental status, unspecified altered mental status type and Urinary tract infection with hematuria, site unspecified were also pertinent to this visit.   Past Medical History  has a past medical history of At high risk for injury related to fall, Atrial flutter (HCC), H/O Doppler ultrasound, H/O echocardiogram, H/O myocardial perfusion scan, Hypertension, Varicose veins, and Venous insufficiency.   Past Surgical History  has a past surgical history that includes Cholecystectomy; hernia repair; Tooth Extraction; Cataract removal; Hemorrhoid surgery; Prostate Cryoablation (2016); cyst removal; Cardioversion (3/6/2012); and Colonoscopy (N/A, 2020).       Discharge Recommendations  Home with Home Health PT or Home with assist PRN  Equipment: Continue to assess    Assessment  Conditions Requiring Skilled Therapeutic Intervention: Decreased functional mobility, Decreased strength, Decreased endurance, Decreased ADL status, Decreased high level ADLs/IADLs, Impaired safety awareness  Assessment of Evaluation: Patient is a 84 y.o. male who presents to [facility] with sepsis d/t UTI. Patient would benefit from continued skilled physical therapy services to address decreased strength, endurance, impaired balance, and decline in functional mobility.  Treatment Diagnosis: Muscle weakness (generalized) and Difficulty walking  Therapy Prognosis: Good  Decision Making: Medium Complexity  Requires PT Follow-Up: Yes  Activity Tolerance: Patient tolerated evaluation without incident     Plan  General Plan: 2+  Treatment Initiation: Strengthening, Endurance training, Balance

## 2025-03-10 NOTE — CARE COORDINATION
03/10/25 1011   Service Assessment   Patient Orientation Alert and Oriented   Cognition Alert   History Provided By Patient   Primary Caregiver Self   Support Systems Spouse/Significant Other   Patient's Healthcare Decision Maker is: Patient Declined (Legal Next of Kin Remains as Decision Maker)   PCP Verified by CM Yes   Prior Functional Level Independent in ADLs/IADLs   Current Functional Level Independent in ADLs/IADLs   Can patient return to prior living arrangement Yes   Ability to make needs known: Good   Family able to assist with home care needs: Yes   Would you like for me to discuss the discharge plan with any other family members/significant others, and if so, who? Yes  (Daughter)   Financial Resources Medicare   Social/Functional History   Lives With Spouse   Type of Home House   Receives Help From Family   Active  Yes   Discharge Planning   Type of Residence House   Living Arrangements Spouse/Significant Other;Children   Current Services Prior To Admission None   Patient expects to be discharged to: House   Services At/After Discharge   Mode of Transport at Discharge Other (see comment)  (Family)   Confirm Follow Up Transport Family   Condition of Participation: Discharge Planning   The Plan for Transition of Care is related to the following treatment goals: Plans to retrun home   The Patient and/or Patient Representative was provided with a Choice of Provider? Patient   The Patient and/Or Patient Representative agree with the Discharge Plan? Yes   Freedom of Choice list was provided with basic dialogue that supports the patient's individualized plan of care/goals, treatment preferences, and shares the quality data associated with the providers?  Yes     CM met with the patient to initiate discharge planning, patient sitting in the recliner resting quietly on room air.  Patient lives at home with his spouse, has medical coverage & PCP, stated that he is independent with ADLs, and is an active

## 2025-03-10 NOTE — ED PROVIDER NOTES
[facility] EMERGENCY DEPARTMENT  EMERGENCY DEPARTMENT ENCOUNTER      Pt Name: Juwan Hooks  MRN: [id number]  Birthdate 1940  Date of evaluation: 3/9/2025  Provider: Sylwia Baltazar MD    CHIEF COMPLAINT       Chief Complaint   Patient presents with    Altered Mental Status    Fever    Dysuria     Incontinence and frequency         HISTORY OF PRESENT ILLNESS      Juwan Hooks is a 84 y.o. male who presents to the emergency department  for   Chief Complaint   Patient presents with    Altered Mental Status    Fever    Dysuria     Incontinence and frequency       84-year-old male presents with confusion as well as dysuria and urinary frequency.  He presents with family who provides confirmation.  Spencer ports he has been very confused today and also been peeing frequently.  He does have a history of prior urinary tract infection.  Family denies that he has had any respiratory difficulties.  No cough or sputum production.  No falls or injuries.  He is not having any focal logical deficits or lateralizing symptoms.  Does present to the emergency department no signs of respiratory distress.  He is answering questions but number of his answers are inappropriate.  Family ports that this is not his mental status baseline.  He denies any acute pain complaints.  Does present febrile and tacky, meeting SIRS criteria.  Family ports that in January, few months ago he was treated outpatient for urinary tract infection.          Nursing Notes, Triage Notes & Vital Signs were reviewed.      REVIEW OF SYSTEMS    (2-9 systems for level 4, 10 or more for level 5)     Review of Systems   Genitourinary:  Positive for dysuria.   Psychiatric/Behavioral:  Positive for confusion.        Except as noted above the remainder of the review of systems was reviewed and negative.       PAST MEDICAL HISTORY     Past Medical History:   Diagnosis Date    At high risk for injury related to fall 01/30/2024    Atrial flutter (HCC)     H/O  respiratory viral illnesses.    The Patient was instructed to read the package inserts with any medication that was prescribed.  Major potential reactions and medication interactions were discussed.  The Patient understands that there are numerous possible adverse reactions not covered.    The patient was also instructed to arrange follow-up with his or her primary care provider for review of any pending labwork or incidental findings on any radiology results that were obtained.  All efforts were made to discuss any incidental findings that require further monitoring.      Controlled Substances Monitoring:          No data to display                (Please note that portions of this note were completed with a voice recognition program.  Efforts were made to edit the dictations but occasionally words are mis-transcribed.)    Sylwia Baltazar MD (electronically signed)  Attending Emergency Physician           Sylwia Cantrell MD  03/10/25 0039       Sylwia Cantrell MD  03/10/25 0040

## 2025-03-10 NOTE — ED NOTES
ED TO INPATIENT SBAR HANDOFF    Patient Name: Juwan Hooks   :  1940  84 y.o.   Preferred Name  Juwan  Family/Caregiver Present yes   Restraints no   C-SSRS: Risk of Suicide: No Risk  Sitter no   Sepsis Risk Score        Situation  Chief Complaint   Patient presents with    Altered Mental Status    Fever    Dysuria     Incontinence and frequency     Brief Description of Patient's Condition: 84-year-old male presents with confusion as well as dysuria and urinary frequency. He presents with family who provides confirmation. Spencer ports he has been very confused today and also been peeing frequently. He does have a history of prior urinary tract infection. Family denies that he has had any respiratory difficulties. No cough or sputum production. No falls or injuries. He is not having any focal logical deficits or lateralizing symptoms. Does present to the emergency department no signs of respiratory distress. He is answering questions but number of his answers are inappropriate. Family ports that this is not his mental status baseline. He denies any acute pain complaints. Does present febrile and tacky, meeting SIRS criteria. Family ports that in January, few months ago he was treated outpatient for urinary tract infection.   Mental Status: disoriented and alert  Arrived from: home    Imaging:   CT ABDOMEN PELVIS WO CONTRAST Additional Contrast? None   Final Result      CT HEAD WO CONTRAST   Final Result      XR CHEST PORTABLE   Final Result        Abnormal labs:   Abnormal Labs Reviewed   COMPREHENSIVE METABOLIC PANEL - Abnormal; Notable for the following components:       Result Value    Sodium 134 (*)     Chloride 96 (*)     Glucose 115 (*)     BUN 27 (*)     Est, Glom Filt Rate 60 (*)     All other components within normal limits   CBC WITH AUTO DIFFERENTIAL - Abnormal; Notable for the following components:    WBC 14.2 (*)     RBC 4.17 (*)     Hematocrit 41.6 (*)     MCH 33.6 (*)     Neutrophils % 82 (*)

## 2025-03-10 NOTE — H&P
(TYLENOL) 650 MG extended release tablet Take 1 tablet by mouth every 8 hours as needed for Pain    Provider, Timbo, MD       Physical Exam:     GEN: Awake male, sitting upright in bed in no apparent distress. Appears given age.  Not ill or toxic-appearing  EYES: PERRL, No scleral erythema, discharge, or conjunctivitis. EOMI  HENT: Mucous membranes are moist. External ears and nose appear normal.   NECK: Supple, no apparent thyromegaly or masses.  RESP: Symmetric chest movement while on room air. No Respiratory Distress  CARDIO/VASC: Irregular rate and rhythm, peripheral edema  GI: Rectal exam deferred.   : No SP/CVA tenderness  HEME/LYMPH: No adenopthy  MSK: No gross joint deformities.  Strength equal bilaterally  SKIN: Normal coloration, warm, dry.  NEURO: Cranial nerves appear grossly intact, normal speech, no lateralizing weakness.  PSYCH: Awake, alert, oriented x 3.  Affect appropriate.        Past Medical History:   PMHx   Past Medical History:   Diagnosis Date    At high risk for injury related to fall 01/30/2024    Atrial flutter (HCC)     H/O Doppler ultrasound 10/26/2015    Peripheral vascular.     H/O echocardiogram 02/07/2012    Mild concentric LVH, mild RV and aortic root dilatation, mild tricuspid regurg. w/pulmonary HTN, EF 50-55%.    H/O myocardial perfusion scan 02/07/2012    Lexiscan Thallium.  Normal perfusion, EF 44%.  Patient is in atrial flutter w/rapid rate.    Hypertension     Varicose veins 11/17/2015    Both lower extremities left>right     Venous insufficiency 11/14/2017     PSHX:  has a past surgical history that includes Cholecystectomy; hernia repair; Tooth Extraction; Cataract removal; Hemorrhoid surgery; Prostate Cryoablation (09/22/2016); cyst removal; Cardioversion (3/6/2012); and Colonoscopy (N/A, 11/11/2020).  Allergies:   Allergies   Allergen Reactions    Latex Rash    Dicyclomine     Neosporin [Bacitracin-Neomycin-Polymyxin] Rash     Fam HX:  family history is not on  discussed this patient with the ED provider and Dr. Pozo. I did a review of patient's medical records, lab results and imaging conducted today. I personally reviewed patient's vital signs including pulse ox and EKG.     Electronically signed by VANESA Houston CNP on 3/10/2025 at 2:09 AM

## 2025-03-11 LAB
ANION GAP SERPL CALCULATED.3IONS-SCNC: 14 MMOL/L (ref 4–16)
BASOPHILS # BLD: 0.03 K/UL
BASOPHILS NFR BLD: 0 % (ref 0–1)
BUN SERPL-MCNC: 26 MG/DL (ref 6–23)
CALCIUM SERPL-MCNC: 8.1 MG/DL (ref 8.3–10.6)
CHLORIDE SERPL-SCNC: 97 MMOL/L (ref 99–110)
CO2 SERPL-SCNC: 20 MMOL/L (ref 21–32)
CREAT SERPL-MCNC: 0.9 MG/DL (ref 0.9–1.3)
EOSINOPHIL # BLD: 0.01 K/UL
EOSINOPHILS RELATIVE PERCENT: 0 % (ref 0–3)
ERYTHROCYTE [DISTWIDTH] IN BLOOD BY AUTOMATED COUNT: 12.9 % (ref 11.7–14.9)
GFR, ESTIMATED: 84 ML/MIN/1.73M2
GLUCOSE SERPL-MCNC: 143 MG/DL (ref 74–99)
HCT VFR BLD AUTO: 37.4 % (ref 42–52)
HGB BLD-MCNC: 12.5 G/DL (ref 13.5–18)
IMM GRANULOCYTES # BLD AUTO: 0.12 K/UL
IMM GRANULOCYTES NFR BLD: 1 %
INR PPP: 1.7
LACTATE BLDV-SCNC: 1.8 MMOL/L (ref 0.4–2)
LYMPHOCYTES NFR BLD: 2.06 K/UL
LYMPHOCYTES RELATIVE PERCENT: 10 % (ref 24–44)
MCH RBC QN AUTO: 33.7 PG (ref 27–31)
MCHC RBC AUTO-ENTMCNC: 33.4 G/DL (ref 32–36)
MCV RBC AUTO: 100.8 FL (ref 78–100)
MONOCYTES NFR BLD: 1.11 K/UL
MONOCYTES NFR BLD: 6 % (ref 0–4)
NEUTROPHILS NFR BLD: 84 % (ref 36–66)
NEUTS SEG NFR BLD: 16.97 K/UL
PLATELET # BLD AUTO: 144 K/UL (ref 140–440)
PMV BLD AUTO: 9.2 FL (ref 7.5–11.1)
POTASSIUM SERPL-SCNC: 3.4 MMOL/L (ref 3.5–5.1)
PROTHROMBIN TIME: 20.8 SEC (ref 11.7–14.5)
RBC # BLD AUTO: 3.71 M/UL (ref 4.6–6.2)
SODIUM SERPL-SCNC: 131 MMOL/L (ref 135–145)
WBC OTHER # BLD: 20.3 K/UL (ref 4–10.5)

## 2025-03-11 PROCEDURE — 6370000000 HC RX 637 (ALT 250 FOR IP): Performed by: INTERNAL MEDICINE

## 2025-03-11 PROCEDURE — 94761 N-INVAS EAR/PLS OXIMETRY MLT: CPT

## 2025-03-11 PROCEDURE — 6370000000 HC RX 637 (ALT 250 FOR IP): Performed by: NURSE PRACTITIONER

## 2025-03-11 PROCEDURE — 85025 COMPLETE CBC W/AUTO DIFF WBC: CPT

## 2025-03-11 PROCEDURE — 1200000000 HC SEMI PRIVATE

## 2025-03-11 PROCEDURE — 6370000000 HC RX 637 (ALT 250 FOR IP)

## 2025-03-11 PROCEDURE — 2500000003 HC RX 250 WO HCPCS

## 2025-03-11 PROCEDURE — 83605 ASSAY OF LACTIC ACID: CPT

## 2025-03-11 PROCEDURE — 6360000002 HC RX W HCPCS

## 2025-03-11 PROCEDURE — 80048 BASIC METABOLIC PNL TOTAL CA: CPT

## 2025-03-11 PROCEDURE — 87040 BLOOD CULTURE FOR BACTERIA: CPT

## 2025-03-11 PROCEDURE — 85610 PROTHROMBIN TIME: CPT

## 2025-03-11 PROCEDURE — 2580000003 HC RX 258

## 2025-03-11 PROCEDURE — 36415 COLL VENOUS BLD VENIPUNCTURE: CPT

## 2025-03-11 RX ORDER — WARFARIN SODIUM 5 MG/1
5 TABLET ORAL
Status: COMPLETED | OUTPATIENT
Start: 2025-03-11 | End: 2025-03-11

## 2025-03-11 RX ORDER — HYDROCORTISONE 25 MG/G
CREAM TOPICAL 2 TIMES DAILY
Status: DISCONTINUED | OUTPATIENT
Start: 2025-03-11 | End: 2025-03-12 | Stop reason: HOSPADM

## 2025-03-11 RX ORDER — WARFARIN SODIUM 2.5 MG/1
2.5 TABLET ORAL
Status: DISCONTINUED | OUTPATIENT
Start: 2025-03-12 | End: 2025-03-12

## 2025-03-11 RX ADMIN — ACETAMINOPHEN 650 MG: 325 TABLET ORAL at 22:19

## 2025-03-11 RX ADMIN — WATER 1000 MG: 1 INJECTION INTRAMUSCULAR; INTRAVENOUS; SUBCUTANEOUS at 21:28

## 2025-03-11 RX ADMIN — WARFARIN SODIUM 5 MG: 5 TABLET ORAL at 17:29

## 2025-03-11 RX ADMIN — SALINE NASAL SPRAY 1 SPRAY: 1.5 SOLUTION NASAL at 22:20

## 2025-03-11 RX ADMIN — METOPROLOL TARTRATE 50 MG: 50 TABLET, FILM COATED ORAL at 12:53

## 2025-03-11 RX ADMIN — SODIUM CHLORIDE, POTASSIUM CHLORIDE, SODIUM LACTATE AND CALCIUM CHLORIDE: 600; 310; 30; 20 INJECTION, SOLUTION INTRAVENOUS at 13:51

## 2025-03-11 RX ADMIN — ATORVASTATIN CALCIUM 10 MG: 10 TABLET, FILM COATED ORAL at 20:48

## 2025-03-11 RX ADMIN — OXYBUTYNIN CHLORIDE 10 MG: 10 TABLET, EXTENDED RELEASE ORAL at 17:29

## 2025-03-11 RX ADMIN — LOSARTAN POTASSIUM 12.5 MG: 25 TABLET, FILM COATED ORAL at 20:48

## 2025-03-11 RX ADMIN — ACETAMINOPHEN 650 MG: 325 TABLET ORAL at 15:57

## 2025-03-11 RX ADMIN — SODIUM CHLORIDE, PRESERVATIVE FREE 10 ML: 5 INJECTION INTRAVENOUS at 09:27

## 2025-03-11 RX ADMIN — METOPROLOL TARTRATE 50 MG: 50 TABLET, FILM COATED ORAL at 20:48

## 2025-03-11 RX ADMIN — ACETAMINOPHEN 650 MG: 325 TABLET ORAL at 02:12

## 2025-03-11 ASSESSMENT — PAIN - FUNCTIONAL ASSESSMENT
PAIN_FUNCTIONAL_ASSESSMENT: ACTIVITIES ARE NOT PREVENTED

## 2025-03-11 ASSESSMENT — PAIN DESCRIPTION - PAIN TYPE
TYPE: CHRONIC PAIN

## 2025-03-11 ASSESSMENT — PAIN DESCRIPTION - ONSET
ONSET: ON-GOING
ONSET: AWAKENED FROM SLEEP
ONSET: ON-GOING

## 2025-03-11 ASSESSMENT — PAIN DESCRIPTION - DESCRIPTORS
DESCRIPTORS: ACHING;DISCOMFORT
DESCRIPTORS: ACHING
DESCRIPTORS: ACHING

## 2025-03-11 ASSESSMENT — PAIN DESCRIPTION - LOCATION
LOCATION: BACK
LOCATION: HEAD
LOCATION: BACK

## 2025-03-11 ASSESSMENT — PAIN SCALES - GENERAL
PAINLEVEL_OUTOF10: 2
PAINLEVEL_OUTOF10: 0
PAINLEVEL_OUTOF10: 3
PAINLEVEL_OUTOF10: 0

## 2025-03-11 ASSESSMENT — PAIN DESCRIPTION - DIRECTION
RADIATING_TOWARDS: NOT
RADIATING_TOWARDS: NO

## 2025-03-11 ASSESSMENT — PAIN DESCRIPTION - ORIENTATION
ORIENTATION: MID
ORIENTATION: MID;LOWER
ORIENTATION: MID

## 2025-03-11 ASSESSMENT — PAIN DESCRIPTION - FREQUENCY
FREQUENCY: INTERMITTENT

## 2025-03-11 NOTE — PROGRESS NOTES
PHARMACY ANTICOAGULATION MONITORING SERVICE    Juwan Hooks is a 84 y.o. male on warfarin therapy for atrial fibrillation. Pharmacy consulted by Afshin Doran CNP for monitoring and adjustment of treatment.    Indication for anticoagulation: atrial fibrillation  INR goal: 2-3  Warfarin dose prior to admission: warfarin 2.5mg daily except 3.5mg Mondays and Fridays     Pertinent Laboratory Values   Recent Labs     03/09/25 2207 03/09/25  2207 03/10/25  0535 03/11/25  0637   INR  --    < > 2.1 1.7   HGB 14.0  --  12.7* 12.5*   HCT 41.6*  --  38.2* 37.4*     --  157 144    < > = values in this interval not displayed.     Assessment/Plan:  Drug Interactions: no new interactions  INR subtherapeutic today at 1.7  Give warfarin 5mg x 1 then will continue warfarin 2.5mg daily except 3.5mg Mondays and Fridays   Pharmacy will continue to monitor and adjust warfarin therapy as indicated    Thank you for the consult.  Britt Tobar, [facility]  3/11/2025 7:40 AM

## 2025-03-11 NOTE — PROGRESS NOTES
[place]2.[place]    [facility] [place]      Name:  Juwan Hooks /Age/Sex: 1940  (84 y.o. male)   MRN & CSN:  [id number] & [id number] Encounter Date/Time: 3/11/2025 5:54 PM EDT   Location:   PCP: Carlos De Los Santos MD     Attending:Hany Uriarte MD       Hospital Day: 3    Assessment and Recommendations   Juwan Hooks is a 84 y.o. male  who presents with Sepsis (HCC)      Plan:   Sepsis present on admission now resolved.  Sepsis most likely secondary to acute cystitis and bacteremia.  Acute cystitis without hematuria, patient is currently on Rocephin awaiting sensitivities, culture did show E. coli.  Bacteremia, 1 of 2 blood cultures positive for E. coli, repeat blood cultures have been ordered.  Continue Rocephin.  Permanent atrial fibrillation, continue Coumadin, daily INR, pharmacy is dosing Coumadin.  Hypertension continue olmesartan and holding hydrochlorothiazide  Hyperlipidemia continue Lipitor  BPH continue oxybutynin  Osteoarthritis, not currently on any home medications      Diet ADULT DIET; Regular   DVT Prophylaxis [] Lovenox, []  Heparin, [] SCDs, [] Ambulation,  [] Eliquis, [] Xarelto  [x] Coumadin   Code Status Full Code   Disposition From: Home  Expected Disposition: Home  Estimated Date of Discharge: 24 hours  Patient requires continued admission due to awaiting urine cultures and negative blood cultures   Surrogate Decision Maker/ ABIGAIL Dianna Barba spouse     Personally reviewed Lab Studies and Imaging     Discussed management of the case with Dr. Chang my supervising physician who is in agreement with the above-noted plan of care.         Drugs that require monitoring for toxicity include Coumadin and the method of monitoring was INR        Subjective:     Chief Complaint:     Patient seen and examined today at bedside he is in bed sitting in the chair he has been up to the bathroom he is doing well he wants to go home.  I did discuss with him what we are waiting on most likely can

## 2025-03-11 NOTE — PLAN OF CARE
Problem: Safety - Adult  Goal: Free from fall injury  3/10/2025 2337 by Jacqueline Anand RN  Outcome: Progressing  3/10/2025 1711 by Fawn Fernandez RN  Outcome: Progressing     Problem: Pain  Goal: Verbalizes/displays adequate comfort level or baseline comfort level  3/10/2025 2337 by Jacqueline Anand, RN  Outcome: Progressing  3/10/2025 1711 by Fawn Fernandez RN  Outcome: Progressing     Problem: ABCDS Injury Assessment  Goal: Absence of physical injury  3/10/2025 2337 by Jacqueline Anand RN  Outcome: Progressing  3/10/2025 1711 by Fawn Fernandez RN  Outcome: Progressing

## 2025-03-11 NOTE — PROGRESS NOTES
Comprehensive Nutrition Assessment    Type and Reason for Visit:  Initial, Positive nutrition screen (wt loss prior to admission)    Nutrition Recommendations/Plan:   Continue to provide regular diet  Offer snacks between meals to optimize po intake  Encourage po intake at all meals, offer alternatives at meals to optimize po intake  Please document accurate weights and po intakes to assess  Will monitor weights, labs, po intakes and POC     Malnutrition Assessment:  Malnutrition Status:  At risk for malnutrition (Pending wt status) (03/11/25 7724)    Context:  Acute Illness     Findings of the 6 clinical characteristics of malnutrition:  Energy Intake:  No decrease in energy intake (per pt report)  Weight Loss:  Unable to assess (Per chart review pt with possible 12# wt loss over past 8 months and possible 22# wt loss over past almost 2 years. Pt reports more recently a 30# wt loss-question if difference in scales-will monitor closely.)     Body Fat Loss:  No body fat loss     Muscle Mass Loss:  No muscle mass loss    Fluid Accumulation:  Unable to assess     Strength:  Not Performed    Nutrition Assessment:    Pt admitted from home with wife for sepsis, UTI with hematuria, altered mental status, blood culture +E Coli. Hx includes afib, HTN, HLD, BPH, OA. Diet order regular with noted intakes 50% or greater. Pt reports he has a good appetite-does report he feels it is a lot of food but is eating as much as he can. Pt also reports a possible wt loss of 30# prior to admission-noted per chart review weight recently ranging from 200-210# and upwards of 220# in the past 2 years-question if difference in scales from home to facility-will monitor. Pt is at risk for malnutrition d/t possible wt loss. Pt at this time denies need for further nutrition interventions as well. Will monitor and follow at moderate nutrition risk.    Nutrition Related Findings:    Na 131 L, K+ 3.4 L, Cl 97 L, GLuc 143 H, BUN 26 H, WBC 20.3 H.

## 2025-03-12 VITALS
RESPIRATION RATE: 18 BRPM | OXYGEN SATURATION: 93 % | SYSTOLIC BLOOD PRESSURE: 141 MMHG | BODY MASS INDEX: 27.78 KG/M2 | DIASTOLIC BLOOD PRESSURE: 89 MMHG | WEIGHT: 198.41 LBS | HEART RATE: 97 BPM | HEIGHT: 71 IN | TEMPERATURE: 97.9 F

## 2025-03-12 LAB
ANION GAP SERPL CALCULATED.3IONS-SCNC: 13 MMOL/L (ref 9–17)
BASOPHILS # BLD: 0.03 K/UL
BASOPHILS NFR BLD: 0 % (ref 0–1)
BUN SERPL-MCNC: 24 MG/DL (ref 7–20)
CALCIUM SERPL-MCNC: 8.1 MG/DL (ref 8.3–10.6)
CHLORIDE SERPL-SCNC: 96 MMOL/L (ref 99–110)
CO2 SERPL-SCNC: 20 MMOL/L (ref 21–32)
CREAT SERPL-MCNC: 0.9 MG/DL (ref 0.8–1.3)
EOSINOPHIL # BLD: 0.03 K/UL
EOSINOPHILS RELATIVE PERCENT: 0 % (ref 0–3)
ERYTHROCYTE [DISTWIDTH] IN BLOOD BY AUTOMATED COUNT: 12.6 % (ref 11.7–14.9)
GFR, ESTIMATED: 84 ML/MIN/1.73M2
GLUCOSE SERPL-MCNC: 105 MG/DL (ref 74–99)
HCT VFR BLD AUTO: 36.6 % (ref 42–52)
HGB BLD-MCNC: 12.4 G/DL (ref 13.5–18)
IMM GRANULOCYTES # BLD AUTO: 0.1 K/UL
IMM GRANULOCYTES NFR BLD: 1 %
INR PPP: 1.3
LYMPHOCYTES NFR BLD: 1.72 K/UL
LYMPHOCYTES RELATIVE PERCENT: 12 % (ref 24–44)
MAGNESIUM SERPL-MCNC: 1.7 MG/DL (ref 1.8–2.4)
MCH RBC QN AUTO: 33.4 PG (ref 27–31)
MCHC RBC AUTO-ENTMCNC: 33.9 G/DL (ref 32–36)
MCV RBC AUTO: 98.7 FL (ref 78–100)
MICROORGANISM SPEC CULT: ABNORMAL
MONOCYTES NFR BLD: 1.01 K/UL
MONOCYTES NFR BLD: 7 % (ref 0–4)
NEUTROPHILS NFR BLD: 80 % (ref 36–66)
NEUTS SEG NFR BLD: 11.76 K/UL
PLATELET # BLD AUTO: 160 K/UL (ref 140–440)
PMV BLD AUTO: 9.3 FL (ref 7.5–11.1)
POTASSIUM SERPL-SCNC: 3.4 MMOL/L (ref 3.5–5.1)
PROTHROMBIN TIME: 17 SEC (ref 11.7–14.5)
RBC # BLD AUTO: 3.71 M/UL (ref 4.6–6.2)
SERVICE CMNT-IMP: ABNORMAL
SODIUM SERPL-SCNC: 129 MMOL/L (ref 136–145)
SPECIMEN DESCRIPTION: ABNORMAL
WBC OTHER # BLD: 14.7 K/UL (ref 4–10.5)

## 2025-03-12 PROCEDURE — 36415 COLL VENOUS BLD VENIPUNCTURE: CPT

## 2025-03-12 PROCEDURE — 6370000000 HC RX 637 (ALT 250 FOR IP)

## 2025-03-12 PROCEDURE — 85025 COMPLETE CBC W/AUTO DIFF WBC: CPT

## 2025-03-12 PROCEDURE — 6370000000 HC RX 637 (ALT 250 FOR IP): Performed by: NURSE PRACTITIONER

## 2025-03-12 PROCEDURE — 94761 N-INVAS EAR/PLS OXIMETRY MLT: CPT

## 2025-03-12 PROCEDURE — 83735 ASSAY OF MAGNESIUM: CPT

## 2025-03-12 PROCEDURE — 6370000000 HC RX 637 (ALT 250 FOR IP): Performed by: INTERNAL MEDICINE

## 2025-03-12 PROCEDURE — 80048 BASIC METABOLIC PNL TOTAL CA: CPT

## 2025-03-12 PROCEDURE — 85610 PROTHROMBIN TIME: CPT

## 2025-03-12 RX ORDER — HYDROCORTISONE 25 MG/G
CREAM TOPICAL
Qty: 28 G | Refills: 0 | Status: SHIPPED | OUTPATIENT
Start: 2025-03-12

## 2025-03-12 RX ORDER — WARFARIN SODIUM 5 MG/1
5 TABLET ORAL
Status: COMPLETED | OUTPATIENT
Start: 2025-03-12 | End: 2025-03-12

## 2025-03-12 RX ORDER — POTASSIUM CHLORIDE 1500 MG/1
40 TABLET, EXTENDED RELEASE ORAL ONCE
Status: COMPLETED | OUTPATIENT
Start: 2025-03-12 | End: 2025-03-12

## 2025-03-12 RX ORDER — WARFARIN SODIUM 5 MG/1
5 TABLET ORAL
Status: DISCONTINUED | OUTPATIENT
Start: 2025-03-12 | End: 2025-03-12

## 2025-03-12 RX ORDER — LANOLIN ALCOHOL/MO/W.PET/CERES
400 CREAM (GRAM) TOPICAL DAILY
Qty: 30 TABLET | Refills: 0 | Status: SHIPPED | OUTPATIENT
Start: 2025-03-12

## 2025-03-12 RX ORDER — LANOLIN ALCOHOL/MO/W.PET/CERES
400 CREAM (GRAM) TOPICAL ONCE
Status: COMPLETED | OUTPATIENT
Start: 2025-03-12 | End: 2025-03-12

## 2025-03-12 RX ORDER — CIPROFLOXACIN 500 MG/1
500 TABLET, FILM COATED ORAL 2 TIMES DAILY
Qty: 20 TABLET | Refills: 0 | Status: SHIPPED | OUTPATIENT
Start: 2025-03-12 | End: 2025-03-22

## 2025-03-12 RX ORDER — POTASSIUM CHLORIDE 750 MG/1
10 TABLET, EXTENDED RELEASE ORAL DAILY
Qty: 90 TABLET | Refills: 1 | Status: SHIPPED | OUTPATIENT
Start: 2025-03-12

## 2025-03-12 RX ORDER — WARFARIN SODIUM 2.5 MG/1
2.5 TABLET ORAL
Status: DISCONTINUED | OUTPATIENT
Start: 2025-03-13 | End: 2025-03-12 | Stop reason: HOSPADM

## 2025-03-12 RX ADMIN — POTASSIUM CHLORIDE 40 MEQ: 1500 TABLET, EXTENDED RELEASE ORAL at 10:47

## 2025-03-12 RX ADMIN — Medication 400 MG: at 10:47

## 2025-03-12 RX ADMIN — DICLOFENAC SODIUM 4 G: 10 GEL TOPICAL at 09:59

## 2025-03-12 RX ADMIN — ACETAMINOPHEN 650 MG: 325 TABLET ORAL at 05:02

## 2025-03-12 RX ADMIN — DICLOFENAC SODIUM 4 G: 10 GEL TOPICAL at 01:33

## 2025-03-12 RX ADMIN — WARFARIN SODIUM 5 MG: 5 TABLET ORAL at 10:47

## 2025-03-12 RX ADMIN — METOPROLOL TARTRATE 50 MG: 50 TABLET, FILM COATED ORAL at 09:59

## 2025-03-12 ASSESSMENT — PAIN DESCRIPTION - DESCRIPTORS
DESCRIPTORS: ACHING
DESCRIPTORS: ACHING

## 2025-03-12 ASSESSMENT — PAIN DESCRIPTION - FREQUENCY
FREQUENCY: INTERMITTENT
FREQUENCY: INTERMITTENT

## 2025-03-12 ASSESSMENT — PAIN DESCRIPTION - LOCATION
LOCATION: BACK
LOCATION: BACK

## 2025-03-12 ASSESSMENT — PAIN DESCRIPTION - ORIENTATION
ORIENTATION: LOWER
ORIENTATION: LOWER;MID

## 2025-03-12 ASSESSMENT — PAIN - FUNCTIONAL ASSESSMENT
PAIN_FUNCTIONAL_ASSESSMENT: ACTIVITIES ARE NOT PREVENTED
PAIN_FUNCTIONAL_ASSESSMENT: ACTIVITIES ARE NOT PREVENTED

## 2025-03-12 ASSESSMENT — PAIN DESCRIPTION - PAIN TYPE
TYPE: CHRONIC PAIN
TYPE: CHRONIC PAIN

## 2025-03-12 ASSESSMENT — PAIN DESCRIPTION - ONSET
ONSET: ON-GOING
ONSET: ON-GOING

## 2025-03-12 ASSESSMENT — PAIN SCALES - GENERAL
PAINLEVEL_OUTOF10: 0
PAINLEVEL_OUTOF10: 0
PAINLEVEL_OUTOF10: 4
PAINLEVEL_OUTOF10: 3

## 2025-03-12 NOTE — PROGRESS NOTES
Discharge instructions explained to pt and pt's daughter, no questions or concerns at this time. Pt to follow- up with PCP. Prescriptions sent to pharmacy. Peripheral IV removed.

## 2025-03-12 NOTE — PLAN OF CARE
Problem: Safety - Adult  Goal: Free from fall injury  Outcome: Completed     Problem: Pain  Goal: Verbalizes/displays adequate comfort level or baseline comfort level  Outcome: Completed     Problem: ABCDS Injury Assessment  Goal: Absence of physical injury  Outcome: Completed     Problem: Nutrition Deficit:  Goal: Optimize nutritional status  Outcome: Completed

## 2025-03-12 NOTE — DISCHARGE SUMMARY
with voice recognition software. Although every effort is made to review the dictation as it is transcribed, on occasion the spoken word can be misinterpreted by the technology leading to omissions or inappropriate words, phrases or sentences.  Dictated and Electronically Signed By: Jose Hutchison DO 3/9/2025 22:45          CBC:   Recent Labs     03/10/25  0535 03/11/25  0637 03/12/25  0554   WBC 18.0* 20.3* 14.7*   HGB 12.7* 12.5* 12.4*    144 160     BMP:    Recent Labs     03/10/25  0535 03/11/25  0637 03/12/25  0554   * 131* 129*   K 3.8 3.4* 3.4*   CL 98* 97* 96*   CO2 38* 20* 20*   BUN 22 26* 24*   CREATININE 1.1 0.9 0.9   GLUCOSE 103* 143* 105*     Hepatic:   Recent Labs     03/09/25  2207   AST 22   ALT 19   BILITOT 1.0   ALKPHOS 110     Lipids:   Lab Results   Component Value Date/Time    CHOL 175 03/17/2017 10:20 AM    HDL 35 06/06/2022 09:30 AM    TRIG 127 03/17/2017 10:20 AM     Hemoglobin A1C:   Lab Results   Component Value Date/Time    LABA1C 6.3 06/06/2022 09:30 AM     Lactic Acid:   Recent Labs     03/10/25  0650 03/11/25  0637   LACTA 2.2* 1.8       UA:  Lab Results   Component Value Date/Time    NITRU POSITIVE 03/09/2025 07:55 PM    COLORU Yellow 03/09/2025 07:55 PM    PHUR 6.0 03/09/2025 07:55 PM    WBCUA TOO NUMEROUS TO COUNT 03/09/2025 07:55 PM    RBCUA 51  03/09/2025 07:55 PM    RBCUA 2 04/08/2023 06:30 PM    MUCUS RARE 04/01/2022 03:00 PM    TRICHOMONAS NONE SEEN 04/01/2022 03:00 PM    YEAST MODERATE 04/01/2022 03:00 PM    BACTERIA FEW 03/09/2025 07:55 PM    CLARITYU CLEAR 04/08/2023 06:30 PM    LEUKOCYTESUR MODERATE 03/09/2025 07:55 PM    UROBILINOGEN 0.2 03/09/2025 07:55 PM    BILIRUBINUR NEGATIVE 03/09/2025 07:55 PM    BLOODU TRACE 04/08/2023 06:30 PM    GLUCOSEU NEGATIVE 03/09/2025 07:55 PM    KETUA NEGATIVE 03/09/2025 07:55 PM         Time Spent Discharging patient 35 minutes.  Discharge plan discussed with my supervising physician Dr. Chang he is in agreement with

## 2025-03-12 NOTE — PROGRESS NOTES
PHARMACY ANTICOAGULATION MONITORING SERVICE    Juwan Hooks is a 84 y.o. male on warfarin therapy for atrial fibrillation. Pharmacy consulted by Afshin Doran CNP for monitoring and adjustment of treatment.    Indication for anticoagulation: atrial fibrillation  INR goal: 2-3  Warfarin dose prior to admission: warfarin 2.5mg daily except 3.5mg Mondays and Fridays     Pertinent Laboratory Values   Recent Labs     03/10/25  0535 03/11/25  0637 03/12/25  0554   INR 2.1 1.7 1.3   HGB 12.7* 12.5* 12.4*   HCT 38.2* 37.4* 36.6*    144 160     Assessment/Plan:  Drug Interactions: no new interactions  INR subtherapeutic today at 1.3  Repeat warfarin 5mg x 1 then will continue warfarin 2.5mg daily except 3.5mg Mondays and Fridays   Pharmacy will continue to monitor and adjust warfarin therapy as indicated    Thank you for the consult.  Raimundo Siddiqui ANDRZEJ  3/12/2025 9:54 AM   Lip Wedge Excision Repair Text: Given the location of the defect and the proximity to free margins a full thickness wedge repair was deemed most appropriate.  Using a sterile surgical marker, the appropriate repair was drawn incorporating the defect and placing the expected incisions perpendicular to the vermilion border.  The vermilion border was also meticulously outlined to ensure appropriate reapproximation during the repair.  The area thus outlined was incised through and through with a #15 scalpel blade.  The muscularis and dermis were reaproximated with deep sutures following hemostasis. Care was taken to realign the vermilion border before proceeding with the superficial closure.  Once the vermilion was realigned the superfical and mucosal closure was finished.

## 2025-03-13 LAB
ACB COMPLEX DNA BLD POS QL NAA+NON-PROBE: NOT DETECTED
B FRAGILIS DNA BLD POS QL NAA+NON-PROBE: NOT DETECTED
BLACTX-M ISLT/SPM QL: NOT DETECTED
BLAIMP ISLT/SPM QL: NOT DETECTED
BLAKPC ISLT/SPM QL: NOT DETECTED
BLAOXA-48-LIKE ISLT/SPM QL: NOT DETECTED
BLAVIM ISLT/SPM QL: NOT DETECTED
C ALBICANS DNA BLD POS QL NAA+NON-PROBE: NOT DETECTED
C AURIS DNA BLD POS QL NAA+NON-PROBE: NOT DETECTED
C GATTII+NEOFOR DNA BLD POS QL NAA+N-PRB: NOT DETECTED
C GLABRATA DNA BLD POS QL NAA+NON-PROBE: NOT DETECTED
C KRUSEI DNA BLD POS QL NAA+NON-PROBE: NOT DETECTED
C PARAP DNA BLD POS QL NAA+NON-PROBE: NOT DETECTED
C TROPICLS DNA BLD POS QL NAA+NON-PROBE: NOT DETECTED
COLISTIN RES MCR-1 ISLT/SPM QL: NOT DETECTED
E CLOAC COMP DNA BLD POS NAA+NON-PROBE: NOT DETECTED
E COLI DNA BLD POS QL NAA+NON-PROBE: DETECTED
E FAECALIS DNA BLD POS QL NAA+NON-PROBE: NOT DETECTED
E FAECIUM DNA BLD POS QL NAA+NON-PROBE: NOT DETECTED
ENTEROBACTERALES DNA BLD POS NAA+N-PRB: DETECTED
GP B STREP DNA BLD POS QL NAA+NON-PROBE: NOT DETECTED
HAEM INFLU DNA BLD POS QL NAA+NON-PROBE: NOT DETECTED
K OXYTOCA DNA BLD POS QL NAA+NON-PROBE: NOT DETECTED
KLEBSIELLA SP DNA BLD POS QL NAA+NON-PRB: NOT DETECTED
KLEBSIELLA SP DNA BLD POS QL NAA+NON-PRB: NOT DETECTED
L MONOCYTOG DNA BLD POS QL NAA+NON-PROBE: NOT DETECTED
MICROORGANISM SPEC CULT: ABNORMAL
MICROORGANISM/AGENT SPEC: ABNORMAL
N MEN DNA BLD POS QL NAA+NON-PROBE: NOT DETECTED
P AERUGINOSA DNA BLD POS NAA+NON-PROBE: NOT DETECTED
PROTEUS SP DNA BLD POS QL NAA+NON-PROBE: NOT DETECTED
RESISTANT GENE NDM BY PCR: NOT DETECTED
S AUREUS DNA BLD POS QL NAA+NON-PROBE: NOT DETECTED
S AUREUS+CONS DNA BLD POS NAA+NON-PROBE: NOT DETECTED
S EPIDERMIDIS DNA BLD POS QL NAA+NON-PRB: NOT DETECTED
S LUGDUNENSIS DNA BLD POS QL NAA+NON-PRB: NOT DETECTED
S MALTOPHILIA DNA BLD POS QL NAA+NON-PRB: NOT DETECTED
S MARCESCENS DNA BLD POS NAA+NON-PROBE: NOT DETECTED
S PNEUM DNA BLD POS QL NAA+NON-PROBE: NOT DETECTED
S PYO DNA BLD POS QL NAA+NON-PROBE: ABNORMAL
SALMONELLA DNA BLD POS QL NAA+NON-PROBE: NOT DETECTED
SERVICE CMNT-IMP: ABNORMAL
SPECIMEN DESCRIPTION: ABNORMAL
STREPTOCOCCUS DNA BLD POS NAA+NON-PROBE: NOT DETECTED

## 2025-03-16 LAB
MICROORGANISM SPEC CULT: NORMAL
SERVICE CMNT-IMP: NORMAL
SPECIMEN DESCRIPTION: NORMAL

## 2025-03-18 ENCOUNTER — ANTI-COAG VISIT (OUTPATIENT)
Dept: PHARMACY | Age: 85
End: 2025-03-18
Payer: MEDICARE

## 2025-03-18 LAB
INTERNATIONAL NORMALIZATION RATIO, POC: 2
POC INR: 2 (ref 0.9–1.2)
PROTHROMBIN TIME, POC: 23.8
PROTHROMBIN TIME, POC: 23.8 SEC (ref 10–14.3)

## 2025-03-18 PROCEDURE — 99211 OFF/OP EST MAY X REQ PHY/QHP: CPT

## 2025-03-18 PROCEDURE — 85610 PROTHROMBIN TIME: CPT

## 2025-03-18 NOTE — PROGRESS NOTES
Medication Management Service  [facility]  [phone]    Visit Date: 3/18/2025   Subjective:       Juwan Hooks is a 84 y.o. male who presents to clinic today for anticoagulation monitoring and adjustment.    Patient seen in clinic for warfarin management due to  Indication:   atrial fibrillation.   INR goal: of 2.0-3.0.  Duration of therapy: indefinite.    Patient reports the following:   Adherent with regimen  Missed or extra doses: Possible missed dose prior to hospitalization due to confusion   Bleeding or thromboembolic side effects:  None  Significant medication changes: Cipro x 10 days, Magnesium, KCl  Significant dietary changes: None  Significant alcohol or tobacco changes: None  Significant recent illness, disease state changes, or hospitalization: ER 1/24 for hematuria (UTI), Hospitalized 3/9 - 3/12 for sepsis/UTI  Upcoming surgeries or procedures:  None  Falls: None           Assessment and Plan     PT/INR done in office per protocol.   INR today is 2, therapeutic.      Plan:  Will continue current regimen of warfarin 2.5mg daily except 3.5mg Mondays and Fridays.     Recheck INR in 8 week(s).     Patient verbalized understanding of dosing directions and information discussed. Dosing schedule given to patient including phone number, appointment date, and time. Progress note sent to referring office.    Electronically signed by Britt Tobar RPH on 3/18/25    For Pharmacy Admin Tracking Only    Total # of Interventions Recommended: 0  Total # of Interventions Accepted: 0  Time Spent (min): 15

## 2025-03-26 ENCOUNTER — APPOINTMENT (OUTPATIENT)
Dept: CT IMAGING | Age: 85
End: 2025-03-26
Payer: MEDICARE

## 2025-03-26 ENCOUNTER — HOSPITAL ENCOUNTER (EMERGENCY)
Age: 85
Discharge: LEFT BEFORE DISCHARGE | End: 2025-03-26
Attending: STUDENT IN AN ORGANIZED HEALTH CARE EDUCATION/TRAINING PROGRAM
Payer: MEDICARE

## 2025-03-26 VITALS
WEIGHT: 200 LBS | TEMPERATURE: 97.8 F | HEART RATE: 70 BPM | HEIGHT: 71 IN | DIASTOLIC BLOOD PRESSURE: 85 MMHG | BODY MASS INDEX: 28 KG/M2 | RESPIRATION RATE: 18 BRPM | SYSTOLIC BLOOD PRESSURE: 153 MMHG | OXYGEN SATURATION: 97 %
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
## 2025-03-26 DIAGNOSIS — R07.89 CHEST WALL PAIN: ICD-10-CM

## 2025-03-26 DIAGNOSIS — S09.8XXA BLUNT HEAD TRAUMA, INITIAL ENCOUNTER: ICD-10-CM

## 2025-03-26 PROCEDURE — 72125 CT NECK SPINE W/O DYE: CPT

## 2025-03-26 PROCEDURE — 94150 VITAL CAPACITY TEST: CPT

## 2025-03-26 PROCEDURE — 6370000000 HC RX 637 (ALT 250 FOR IP): Performed by: STUDENT IN AN ORGANIZED HEALTH CARE EDUCATION/TRAINING PROGRAM

## 2025-03-26 PROCEDURE — 99284 EMERGENCY DEPT VISIT MOD MDM: CPT

## 2025-03-26 PROCEDURE — 71250 CT THORAX DX C-: CPT

## 2025-03-26 PROCEDURE — 70450 CT HEAD/BRAIN W/O DYE: CPT

## 2025-03-26 RX ORDER — ACETAMINOPHEN 500 MG
1000 TABLET ORAL ONCE
Status: COMPLETED | OUTPATIENT
Start: 2025-03-26 | End: 2025-03-26

## 2025-03-26 RX ADMIN — ACETAMINOPHEN 1000 MG: 500 TABLET ORAL at 09:25

## 2025-03-26 ASSESSMENT — PAIN DESCRIPTION - ONSET: ONSET: SUDDEN

## 2025-03-26 ASSESSMENT — PAIN DESCRIPTION - ORIENTATION
ORIENTATION: LEFT
ORIENTATION: LEFT

## 2025-03-26 ASSESSMENT — PAIN SCALES - GENERAL
PAINLEVEL_OUTOF10: 7
PAINLEVEL_OUTOF10: 3

## 2025-03-26 ASSESSMENT — PAIN DESCRIPTION - PAIN TYPE: TYPE: ACUTE PAIN

## 2025-03-26 ASSESSMENT — PAIN - FUNCTIONAL ASSESSMENT
PAIN_FUNCTIONAL_ASSESSMENT: 0-10
PAIN_FUNCTIONAL_ASSESSMENT: PREVENTS OR INTERFERES SOME ACTIVE ACTIVITIES AND ADLS

## 2025-03-26 ASSESSMENT — PAIN DESCRIPTION - LOCATION
LOCATION: RIB CAGE
LOCATION: RIB CAGE

## 2025-03-26 ASSESSMENT — PAIN DESCRIPTION - DESCRIPTORS
DESCRIPTORS: SHARP
DESCRIPTORS: OTHER (COMMENT)

## 2025-03-26 ASSESSMENT — PAIN DESCRIPTION - FREQUENCY: FREQUENCY: CONTINUOUS

## 2025-03-26 NOTE — DISCHARGE INSTRUCTIONS
Imaging of your cervical spine revealed a possible cervical spine fracture.  If you develop cervical pain, numbness, tingling or weakness of your arms or legs, or any other concerning symptom, please come back promptly to the emergency department.    You also have some rib fractures.  For pain, please take acetaminophen and ibuprofen.    I provided you with the contact and a referral for spine surgery, please make an appointment with them to be seen in the next couple of weeks.    If at some point you have severe shortness of breath, severe nausea or vomiting unable to keep anything down, feels severely weak unable to stand up, feels confused or are lethargic unable to do your normal daily activities, or have any other concerning symptoms, please come back promptly to the emergency department.

## 2025-03-26 NOTE — ED NOTES
Discharge instructions reviewed with patient. Reviewed medications with patient. No additional questions asked.  Voiced understanding. Encouraged patient to follow up as discussed by the ED physician. Reviewed how to access [employer] at discharged with patient. Encourage to sign up either on their smartphone or on the computer to be able to review the information form today's and future visits. Voiced understanding. No additional questions asked. Patient ambulatory without difficulty. Physician aware.Discussed with patient alternating acetaminophen and ibuprofen for pain control. Reviewed taking medications every 6 hours as directed on packages. For example: take acetaminophen then three hours later take ibuprofen then three hours later take acetaminophen then take ibuprofen three hours later. By doing that something is given every three hours for pain reduction and comfort.

## 2025-03-27 NOTE — PROGRESS NOTES
Medication Management Service  [facility]  [phone]    Visit Date: 10/29/2024   Subjective:       Juwan Hooks is a 83 y.o. male who presents to clinic today for anticoagulation monitoring and adjustment.    Patient seen in clinic for warfarin management due to  Indication:   atrial fibrillation.   INR goal: of 2.0-3.0.  Duration of therapy: indefinite.    Patient reports the following:   Adherent with regimen  Missed or extra doses:  None   Bleeding or thromboembolic side effects:  None  Significant medication changes:  None  Significant dietary changes: None  Significant alcohol or tobacco changes: None  Significant recent illness, disease state changes, or hospitalization:  None  Upcoming surgeries or procedures:  None  Falls: None           Assessment and Plan     PT/INR done in office per protocol.   INR today is 2.1, therapeutic.      Plan:  Will continue current regimen of warfarin 2.5mg daily except 3.5mg Mondays and Fridays.     Recheck INR in 12 week(s).     Patient verbalized understanding of dosing directions and information discussed. Dosing schedule given to patient including phone number, appointment date, and time. Progress note sent to referring office.    Electronically signed by Britt Tobar RPH on 10/29/24    For Pharmacy Admin Tracking Only    Total # of Interventions Recommended: 0  Total # of Interventions Accepted: 0  Time Spent (min): 15   show

## 2025-04-07 ENCOUNTER — APPOINTMENT (OUTPATIENT)
Age: 85
End: 2025-04-07
Payer: MEDICARE

## 2025-04-07 ENCOUNTER — HOSPITAL ENCOUNTER (OUTPATIENT)
Age: 85
Setting detail: OBSERVATION
Discharge: HOME | End: 2025-04-07
Attending: STUDENT IN AN ORGANIZED HEALTH CARE EDUCATION/TRAINING PROGRAM | Admitting: HOSPITALIST
Payer: MEDICARE

## 2025-04-07 ENCOUNTER — APPOINTMENT (OUTPATIENT)
Dept: CT IMAGING | Age: 85
End: 2025-04-07
Payer: MEDICARE

## 2025-04-07 VITALS
SYSTOLIC BLOOD PRESSURE: 163 MMHG | OXYGEN SATURATION: 100 % | DIASTOLIC BLOOD PRESSURE: 93 MMHG | WEIGHT: 197 LBS | HEART RATE: 70 BPM | TEMPERATURE: 97.5 F | BODY MASS INDEX: 28.2 KG/M2 | RESPIRATION RATE: 20 BRPM | HEIGHT: 70 IN

## 2025-04-07 DIAGNOSIS — R07.9 CHEST PAIN, UNSPECIFIED TYPE: Primary | ICD-10-CM

## 2025-04-07 DIAGNOSIS — Z79.01 CHRONIC ANTICOAGULATION: ICD-10-CM

## 2025-04-07 DIAGNOSIS — R79.89 ELEVATED TROPONIN: ICD-10-CM

## 2025-04-07 DIAGNOSIS — I51.7 CARDIOMEGALY: ICD-10-CM

## 2025-04-07 LAB
ALBUMIN SERPL-MCNC: 4.4 G/DL (ref 3.4–5)
ALBUMIN/GLOB SERPL: 1.9 {RATIO}
ALP SERPL-CCNC: 101 U/L (ref 40–129)
ALT SERPL-CCNC: 27 U/L (ref 10–40)
ANION GAP SERPL CALCULATED.3IONS-SCNC: 15 MMOL/L (ref 9–17)
AST SERPL-CCNC: 27 U/L (ref 15–37)
BASOPHILS # BLD: 0.05 K/UL
BASOPHILS NFR BLD: 1 % (ref 0–1)
BILIRUB SERPL-MCNC: 0.8 MG/DL (ref 0–1)
BNP SERPL-MCNC: 1678 PG/ML (ref 0–450)
BUN SERPL-MCNC: 16 MG/DL (ref 7–20)
CALCIUM SERPL-MCNC: 9.8 MG/DL (ref 8.3–10.6)
CHLORIDE SERPL-SCNC: 101 MMOL/L (ref 99–110)
CO2 SERPL-SCNC: 22 MMOL/L (ref 21–32)
CREAT SERPL-MCNC: 0.9 MG/DL (ref 0.8–1.3)
ECHO AO ASC DIAM: 2.1 CM
ECHO AO ASCENDING AORTA INDEX: 1.01 CM/M2
ECHO AO ROOT DIAM: 3.7 CM
ECHO AO ROOT INDEX: 1.79 CM/M2
ECHO AV AREA PEAK VELOCITY: 2.4 CM2
ECHO AV AREA VTI: 3 CM2
ECHO AV AREA/BSA PEAK VELOCITY: 1.2 CM2/M2
ECHO AV AREA/BSA VTI: 1.4 CM2/M2
ECHO AV MEAN GRADIENT: 4 MMHG
ECHO AV MEAN VELOCITY: 0.9 M/S
ECHO AV PEAK GRADIENT: 7 MMHG
ECHO AV PEAK VELOCITY: 1.3 M/S
ECHO AV VELOCITY RATIO: 0.54
ECHO AV VTI: 25.3 CM
ECHO BSA: 2.1 M2
ECHO EST RA PRESSURE: 3 MMHG
ECHO IVC EXP: 2.1 CM
ECHO LA AREA 4C: 28.3 CM2
ECHO LA DIAMETER INDEX: 2.32 CM/M2
ECHO LA DIAMETER: 4.8 CM
ECHO LA MAJOR AXIS: 7.3 CM
ECHO LA TO AORTIC ROOT RATIO: 1.3
ECHO LA VOL MOD A4C: 92 ML (ref 18–58)
ECHO LA VOLUME INDEX MOD A4C: 44 ML/M2 (ref 16–34)
ECHO LV E' LATERAL VELOCITY: 12 CM/S
ECHO LV E' SEPTAL VELOCITY: 9.09 CM/S
ECHO LV EDV A4C: 64 ML
ECHO LV EDV INDEX A4C: 31 ML/M2
ECHO LV EF PHYSICIAN: 55 %
ECHO LV EJECTION FRACTION A4C: 77 %
ECHO LV ESV A4C: 15 ML
ECHO LV ESV INDEX A4C: 7 ML/M2
ECHO LV FRACTIONAL SHORTENING: 45 % (ref 28–44)
ECHO LV INTERNAL DIMENSION DIASTOLE INDEX: 1.93 CM/M2
ECHO LV INTERNAL DIMENSION DIASTOLIC: 4 CM (ref 4.2–5.9)
ECHO LV INTERNAL DIMENSION SYSTOLIC INDEX: 1.06 CM/M2
ECHO LV INTERNAL DIMENSION SYSTOLIC: 2.2 CM
ECHO LV IVSD: 1.2 CM (ref 0.6–1)
ECHO LV MASS 2D: 175.8 G (ref 88–224)
ECHO LV MASS INDEX 2D: 84.9 G/M2 (ref 49–115)
ECHO LV POSTERIOR WALL DIASTOLIC: 1.3 CM (ref 0.6–1)
ECHO LV RELATIVE WALL THICKNESS RATIO: 0.65
ECHO LVOT AREA: 4.5 CM2
ECHO LVOT AV VTI INDEX: 0.67
ECHO LVOT DIAM: 2.4 CM
ECHO LVOT MEAN GRADIENT: 1 MMHG
ECHO LVOT PEAK GRADIENT: 2 MMHG
ECHO LVOT PEAK VELOCITY: 0.7 M/S
ECHO LVOT STROKE VOLUME INDEX: 36.9 ML/M2
ECHO LVOT SV: 76.4 ML
ECHO LVOT VTI: 16.9 CM
ECHO MV E VELOCITY: 1.39 M/S
ECHO MV E/E' LATERAL: 11.58
ECHO MV E/E' RATIO (AVERAGED): 13.44
ECHO MV E/E' SEPTAL: 15.29
ECHO RIGHT VENTRICULAR SYSTOLIC PRESSURE (RVSP): 34 MMHG
ECHO RV INTERNAL DIMENSION: 3.5 CM
ECHO TV REGURGITANT MAX VELOCITY: 2.77 M/S
ECHO TV REGURGITANT PEAK GRADIENT: 31 MMHG
EKG DIAGNOSIS: NORMAL
EKG Q-T INTERVAL: 414 MS
EKG QRS DURATION: 128 MS
EKG QTC CALCULATION (BAZETT): 459 MS
EKG R AXIS: 40 DEGREES
EKG T AXIS: 15 DEGREES
EKG VENTRICULAR RATE: 74 BPM
EOSINOPHIL # BLD: 0.24 K/UL
EOSINOPHILS RELATIVE PERCENT: 5 % (ref 0–3)
ERYTHROCYTE [DISTWIDTH] IN BLOOD BY AUTOMATED COUNT: 12.9 % (ref 11.7–14.9)
GFR, ESTIMATED: 79 ML/MIN/1.73M2
GLUCOSE SERPL-MCNC: 91 MG/DL (ref 74–99)
HCO3 VENOUS: 29.1 MMOL/L (ref 22–29)
HCT VFR BLD AUTO: 41 % (ref 42–52)
HGB BLD-MCNC: 13.5 G/DL (ref 13.5–18)
IMM GRANULOCYTES # BLD AUTO: 0.01 K/UL
IMM GRANULOCYTES NFR BLD: 0 %
INR PPP: 2.2
LIPASE SERPL-CCNC: 27 U/L (ref 13–60)
LYMPHOCYTES NFR BLD: 1.29 K/UL
LYMPHOCYTES RELATIVE PERCENT: 28 % (ref 24–44)
MCH RBC QN AUTO: 33.5 PG (ref 27–31)
MCHC RBC AUTO-ENTMCNC: 32.9 G/DL (ref 32–36)
MCV RBC AUTO: 101.7 FL (ref 78–100)
MONOCYTES NFR BLD: 0.54 K/UL
MONOCYTES NFR BLD: 12 % (ref 0–4)
NEUTROPHILS NFR BLD: 55 % (ref 36–66)
NEUTS SEG NFR BLD: 2.55 K/UL
O2 SAT, VEN: 96.3 % (ref 34–95)
PCO2 VENOUS: 39.9 MM HG (ref 41–51)
PH VENOUS: 7.47 (ref 7.32–7.43)
PLATELET # BLD AUTO: 180 K/UL (ref 140–440)
PMV BLD AUTO: 9.1 FL (ref 7.5–11.1)
PO2 VENOUS: 79.2 MM HG (ref 28–48)
POSITIVE BASE EXCESS, VEN: 5.1 MMOL/L (ref 0–3)
POTASSIUM SERPL-SCNC: 4.1 MMOL/L (ref 3.5–5.1)
PROT SERPL-MCNC: 6.8 G/DL (ref 6.4–8.2)
PROTHROMBIN TIME: 24.8 SEC (ref 11.7–14.5)
RBC # BLD AUTO: 4.03 M/UL (ref 4.6–6.2)
SODIUM SERPL-SCNC: 139 MMOL/L (ref 136–145)
TCO2 CALC VENOUS: 30 MMOL/L (ref 21–32)
TROPONIN I SERPL HS-MCNC: 28 NG/L (ref 0–22)
TROPONIN I SERPL HS-MCNC: 29 NG/L (ref 0–22)
WBC OTHER # BLD: 4.7 K/UL (ref 4–10.5)

## 2025-04-07 PROCEDURE — 82803 BLOOD GASES ANY COMBINATION: CPT

## 2025-04-07 PROCEDURE — 85610 PROTHROMBIN TIME: CPT

## 2025-04-07 PROCEDURE — 80053 COMPREHEN METABOLIC PANEL: CPT

## 2025-04-07 PROCEDURE — 93005 ELECTROCARDIOGRAM TRACING: CPT | Performed by: STUDENT IN AN ORGANIZED HEALTH CARE EDUCATION/TRAINING PROGRAM

## 2025-04-07 PROCEDURE — 99285 EMERGENCY DEPT VISIT HI MDM: CPT

## 2025-04-07 PROCEDURE — G0378 HOSPITAL OBSERVATION PER HR: HCPCS

## 2025-04-07 PROCEDURE — 83880 ASSAY OF NATRIURETIC PEPTIDE: CPT

## 2025-04-07 PROCEDURE — 83690 ASSAY OF LIPASE: CPT

## 2025-04-07 PROCEDURE — 85025 COMPLETE CBC W/AUTO DIFF WBC: CPT

## 2025-04-07 PROCEDURE — 99222 1ST HOSP IP/OBS MODERATE 55: CPT | Performed by: INTERNAL MEDICINE

## 2025-04-07 PROCEDURE — 84484 ASSAY OF TROPONIN QUANT: CPT

## 2025-04-07 PROCEDURE — 93010 ELECTROCARDIOGRAM REPORT: CPT | Performed by: INTERNAL MEDICINE

## 2025-04-07 PROCEDURE — 71250 CT THORAX DX C-: CPT

## 2025-04-07 PROCEDURE — 93306 TTE W/DOPPLER COMPLETE: CPT

## 2025-04-07 RX ORDER — ONDANSETRON 2 MG/ML
4 INJECTION INTRAMUSCULAR; INTRAVENOUS EVERY 6 HOURS PRN
Status: DISCONTINUED | OUTPATIENT
Start: 2025-04-07 | End: 2025-04-07 | Stop reason: HOSPADM

## 2025-04-07 RX ORDER — SODIUM CHLORIDE 0.9 % (FLUSH) 0.9 %
5-40 SYRINGE (ML) INJECTION EVERY 12 HOURS SCHEDULED
Status: DISCONTINUED | OUTPATIENT
Start: 2025-04-07 | End: 2025-04-07 | Stop reason: HOSPADM

## 2025-04-07 RX ORDER — SENNA AND DOCUSATE SODIUM 50; 8.6 MG/1; MG/1
1 TABLET, FILM COATED ORAL DAILY
Status: DISCONTINUED | OUTPATIENT
Start: 2025-04-08 | End: 2025-04-07 | Stop reason: HOSPADM

## 2025-04-07 RX ORDER — ONDANSETRON 4 MG/1
4 TABLET, ORALLY DISINTEGRATING ORAL EVERY 8 HOURS PRN
Status: DISCONTINUED | OUTPATIENT
Start: 2025-04-07 | End: 2025-04-07 | Stop reason: HOSPADM

## 2025-04-07 RX ORDER — SODIUM CHLORIDE 9 MG/ML
INJECTION, SOLUTION INTRAVENOUS PRN
Status: DISCONTINUED | OUTPATIENT
Start: 2025-04-07 | End: 2025-04-07 | Stop reason: HOSPADM

## 2025-04-07 RX ORDER — POLYETHYLENE GLYCOL 3350 17 G/17G
17 POWDER, FOR SOLUTION ORAL DAILY PRN
Status: DISCONTINUED | OUTPATIENT
Start: 2025-04-07 | End: 2025-04-07 | Stop reason: HOSPADM

## 2025-04-07 RX ORDER — HYDROCORTISONE 25 MG/G
CREAM TOPICAL 2 TIMES DAILY
Status: DISCONTINUED | OUTPATIENT
Start: 2025-04-07 | End: 2025-04-07 | Stop reason: HOSPADM

## 2025-04-07 RX ORDER — METOPROLOL TARTRATE 50 MG
50 TABLET ORAL 2 TIMES DAILY
Status: DISCONTINUED | OUTPATIENT
Start: 2025-04-07 | End: 2025-04-07 | Stop reason: HOSPADM

## 2025-04-07 RX ORDER — ATORVASTATIN CALCIUM 10 MG/1
10 TABLET, FILM COATED ORAL DAILY
Status: DISCONTINUED | OUTPATIENT
Start: 2025-04-08 | End: 2025-04-07

## 2025-04-07 RX ORDER — ACETAMINOPHEN 325 MG/1
650 TABLET ORAL EVERY 6 HOURS PRN
Status: DISCONTINUED | OUTPATIENT
Start: 2025-04-07 | End: 2025-04-07 | Stop reason: HOSPADM

## 2025-04-07 RX ORDER — OXYBUTYNIN CHLORIDE 5 MG/1
10 TABLET, EXTENDED RELEASE ORAL EVERY EVENING
Status: DISCONTINUED | OUTPATIENT
Start: 2025-04-07 | End: 2025-04-07 | Stop reason: HOSPADM

## 2025-04-07 RX ORDER — ACETAMINOPHEN 500 MG
1000 TABLET ORAL ONCE
Status: DISCONTINUED | OUTPATIENT
Start: 2025-04-07 | End: 2025-04-07 | Stop reason: HOSPADM

## 2025-04-07 RX ORDER — ATORVASTATIN CALCIUM 10 MG/1
10 TABLET, FILM COATED ORAL NIGHTLY
Status: DISCONTINUED | OUTPATIENT
Start: 2025-04-08 | End: 2025-04-07 | Stop reason: HOSPADM

## 2025-04-07 RX ORDER — ASPIRIN 81 MG/1
81 TABLET ORAL DAILY
Status: DISCONTINUED | OUTPATIENT
Start: 2025-04-07 | End: 2025-04-07 | Stop reason: HOSPADM

## 2025-04-07 RX ORDER — LOSARTAN POTASSIUM 25 MG/1
12.5 TABLET ORAL 2 TIMES DAILY
Status: DISCONTINUED | OUTPATIENT
Start: 2025-04-07 | End: 2025-04-07 | Stop reason: HOSPADM

## 2025-04-07 RX ORDER — POTASSIUM CHLORIDE 750 MG/1
10 TABLET, EXTENDED RELEASE ORAL DAILY
Status: DISCONTINUED | OUTPATIENT
Start: 2025-04-08 | End: 2025-04-07 | Stop reason: HOSPADM

## 2025-04-07 RX ORDER — ACETAMINOPHEN 650 MG/1
650 SUPPOSITORY RECTAL EVERY 6 HOURS PRN
Status: DISCONTINUED | OUTPATIENT
Start: 2025-04-07 | End: 2025-04-07 | Stop reason: HOSPADM

## 2025-04-07 RX ORDER — LANOLIN ALCOHOL/MO/W.PET/CERES
400 CREAM (GRAM) TOPICAL DAILY
Status: DISCONTINUED | OUTPATIENT
Start: 2025-04-07 | End: 2025-04-07 | Stop reason: HOSPADM

## 2025-04-07 RX ORDER — ASPIRIN 81 MG/1
81 TABLET, CHEWABLE ORAL DAILY
Status: DISCONTINUED | OUTPATIENT
Start: 2025-04-07 | End: 2025-04-07 | Stop reason: ALTCHOICE

## 2025-04-07 RX ORDER — WARFARIN SODIUM 2.5 MG/1
2.5 TABLET ORAL
Status: DISCONTINUED | OUTPATIENT
Start: 2025-04-08 | End: 2025-04-07 | Stop reason: HOSPADM

## 2025-04-07 RX ORDER — SODIUM CHLORIDE 0.9 % (FLUSH) 0.9 %
5-40 SYRINGE (ML) INJECTION PRN
Status: DISCONTINUED | OUTPATIENT
Start: 2025-04-07 | End: 2025-04-07 | Stop reason: HOSPADM

## 2025-04-07 ASSESSMENT — PAIN DESCRIPTION - FREQUENCY: FREQUENCY: CONTINUOUS

## 2025-04-07 ASSESSMENT — LIFESTYLE VARIABLES
HOW OFTEN DO YOU HAVE A DRINK CONTAINING ALCOHOL: NEVER
HOW OFTEN DO YOU HAVE A DRINK CONTAINING ALCOHOL: NEVER
HOW MANY STANDARD DRINKS CONTAINING ALCOHOL DO YOU HAVE ON A TYPICAL DAY: PATIENT DOES NOT DRINK

## 2025-04-07 ASSESSMENT — PAIN DESCRIPTION - LOCATION: LOCATION: CHEST;NECK

## 2025-04-07 ASSESSMENT — PAIN SCALES - GENERAL
PAINLEVEL_OUTOF10: 0
PAINLEVEL_OUTOF10: 2

## 2025-04-07 ASSESSMENT — PAIN DESCRIPTION - PAIN TYPE: TYPE: ACUTE PAIN

## 2025-04-07 ASSESSMENT — PAIN DESCRIPTION - ORIENTATION: ORIENTATION: LEFT

## 2025-04-07 ASSESSMENT — PAIN - FUNCTIONAL ASSESSMENT
PAIN_FUNCTIONAL_ASSESSMENT: PREVENTS OR INTERFERES SOME ACTIVE ACTIVITIES AND ADLS
PAIN_FUNCTIONAL_ASSESSMENT: 0-10

## 2025-04-07 NOTE — ED PROVIDER NOTES
produced using speech recognition software and may contain errors related to that system including errors in grammar, punctuation, and spelling, as well as words and phrases that may be inappropriate. If there are any questions or concerns please feel free to contact the dictating provider for clarification.        Cliff Dorsey,   04/07/25 1136

## 2025-04-07 NOTE — DISCHARGE INSTRUCTIONS
Your CT showed that you have a thyroid nodule. The radiologist recommended that you have a thyroid ultrasound if this has already not been done. This can be arranged by your primary care doctor.     While in the hospital, you were evaluated for chest discomfort. Your EKG did not show any concerning changes. Your echocardiogram showed that you have normal pump function of your heart, but your heart has a little trouble relaxing which can lead to fluid retention. Follow-up with Dr. Youssef to determine if further work-up is needed.     Return to the emergency department if your chest pain recurs or if you develop increased shortness of breath, palpitations, lightheadedness/loss of consciousness.     Check your blood pressure 1-2 times daily and keep a log to take to your follow-up appointments.

## 2025-04-07 NOTE — CARE COORDINATION
stated that he did not require the use of any assistive devices or home oxygen prior to admission (reports having assistive devices available if needed).  CM spoke with the patient regarding C services following discharge and patient declined.  Patient plans to return home upon discharge and is unable to identify any needs at this time.  CM available if needs arise.

## 2025-04-07 NOTE — DISCHARGE SUMMARY
granulomatous densities are noted. Previous rib fractures may have reflected motion artifact with the current study  showing no evidence of displaced fractures. Borderline cardiomegaly. Suspected chronic pancreatitis. Left thyroid nodule is again identified as previously reported.  Dictated and Electronically Signed By: Demario Myles DO 4/7/2025 9:18          CBC:   Recent Labs     04/07/25  0830   WBC 4.7   HGB 13.5        BMP:    Recent Labs     04/07/25  0830      K 4.1      CO2 22   BUN 16   CREATININE 0.9   GLUCOSE 91     Hepatic:   Recent Labs     04/07/25  0830   AST 27   ALT 27   BILITOT 0.8   ALKPHOS 101     Lipids:   Lab Results   Component Value Date/Time    CHOL 175 03/17/2017 10:20 AM    HDL 35 06/06/2022 09:30 AM    TRIG 127 03/17/2017 10:20 AM     Hemoglobin A1C:   Lab Results   Component Value Date/Time    LABA1C 6.3 06/06/2022 09:30 AM     TSH: No results found for: \"TSH\"  Troponin: No results found for: \"TROPONINT\"  Lactic Acid: No results for input(s): \"LACTA\" in the last 72 hours.  BNP:   Recent Labs     04/07/25  0830   PROBNP 1,678*     UA:  Lab Results   Component Value Date/Time    NITRU POSITIVE 03/09/2025 07:55 PM    COLORU Yellow 03/09/2025 07:55 PM    PHUR 6.0 03/09/2025 07:55 PM    WBCUA TOO NUMEROUS TO COUNT 03/09/2025 07:55 PM    RBCUA 51  03/09/2025 07:55 PM    RBCUA 2 04/08/2023 06:30 PM    MUCUS RARE 04/01/2022 03:00 PM    TRICHOMONAS NONE SEEN 04/01/2022 03:00 PM    YEAST MODERATE 04/01/2022 03:00 PM    BACTERIA FEW 03/09/2025 07:55 PM    CLARITYU CLEAR 04/08/2023 06:30 PM    LEUKOCYTESUR MODERATE 03/09/2025 07:55 PM    UROBILINOGEN 0.2 03/09/2025 07:55 PM    BILIRUBINUR NEGATIVE 03/09/2025 07:55 PM    BLOODU TRACE 04/08/2023 06:30 PM    GLUCOSEU NEGATIVE 03/09/2025 07:55 PM    KETUA NEGATIVE 03/09/2025 07:55 PM     Urine Cultures: No results found for: \"LABURIN\"  Blood Cultures: No results found for: \"BC\"  No results found for: \"BLOODCULT2\"  Organism: No

## 2025-04-07 NOTE — CONSULTS
Neosporin [Bacitracin-Neomycin-Polymyxin] Rash       Current Facility-Administered Medications   Medication Dose Route Frequency Provider Last Rate Last Admin    acetaminophen (TYLENOL) tablet 1,000 mg  1,000 mg Oral Once Cliff Dorsey DO        hydrocortisone (ANUSOL-HC) 2.5 % rectal cream   Rectal BID Morelia Tineo PA-C        magnesium oxide (MAG-OX) tablet 400 mg  400 mg Oral Daily Morelia Tineo PA-C        metoprolol tartrate (LOPRESSOR) tablet 50 mg  50 mg Oral BID Morelia Tineo PA-C        losartan (COZAAR) tablet 12.5 mg  12.5 mg Oral BID Morelia Tineo PA-C        oxyBUTYnin (DITROPAN-XL) extended release tablet 10 mg  10 mg Oral QPM Morelia Tineo PA-C        [START ON 4/8/2025] potassium chloride (KLOR-CON M) extended release tablet 10 mEq  10 mEq Oral Daily Morelia Tineo PA-C        [START ON 4/8/2025] sennosides-docusate sodium (SENOKOT-S) 8.6-50 MG tablet 1 tablet  1 tablet Oral Daily Morelia Tineo PA-C        sodium chloride flush 0.9 % injection 5-40 mL  5-40 mL IntraVENous 2 times per day Morelia Tineo PA-C        sodium chloride flush 0.9 % injection 5-40 mL  5-40 mL IntraVENous PRN Morelia Tineo PA-C        0.9 % sodium chloride infusion   IntraVENous PRN Morelia Tineo PA-C        ondansetron (ZOFRAN-ODT) disintegrating tablet 4 mg  4 mg Oral Q8H PRN Morelia Tineo PA-C        Or    ondansetron (ZOFRAN) injection 4 mg  4 mg IntraVENous Q6H PRN Morelia Tineo PA-C        acetaminophen (TYLENOL) tablet 650 mg  650 mg Oral Q6H PRN Morelia Tineo PA-C        Or    acetaminophen (TYLENOL) suppository 650 mg  650 mg Rectal Q6H PRN Morelia Tineo PA-C        polyethylene glycol (GLYCOLAX) packet 17 g  17 g Oral Daily PRN Morelia Tineo PA-C        aspirin EC tablet 81 mg  81 mg Oral Daily Morelia Tineo PA-C        [START ON 4/8/2025] atorvastatin (LIPITOR) tablet 10 mg  10 mg Oral Nightly Morelia Tineo PA-C           Physical Examination:      Vitals:    04/07/25 1015 04/07/25 1030 04/07/25

## 2025-04-07 NOTE — PROGRESS NOTES
PHARMACY ANTICOAGULATION MONITORING SERVICE    Juwan Hooks is a 84 y.o. male on warfarin therapy for a fib / a flutter. Pharmacy consulted by Morelia Tineo PA-C for monitoring and adjustment of treatment.    Indication for anticoagulation: a fib / a flutter  INR goal: 2-3  Warfarin dose prior to admission: Coumadin 2.5mg on Sunday, Tuesday, Wednesday, Thursday and Saturday. Coumadin 3.5mg on Monday and Friday.     Pertinent Laboratory Values   Recent Labs     04/07/25  0830   INR 2.2   HGB 13.5   HCT 41.0*        Recent Warfarin doses given this admission...  Date INR Result Warfarin Dose Given                         Assessment/Plan:  Drug Interactions: no significant interactions  INR = 2.2 today  Will continue warfarin home dose regimen.   Pharmacy will continue to monitor and adjust warfarin therapy as indicated    Thank you for the consult.  Raimundo Siddiqui RPH  4/7/2025 3:16 PM

## 2025-04-07 NOTE — ED TRIAGE NOTES
Arrived ambulatory with wife to room 3 for triage. Tolerated without difficulty. Bed in lowest position. Call light given. Gowned for exam. Monitor applied. EKG obtained.

## 2025-04-07 NOTE — ED NOTES
Addition information:       Blood Product Administration: no  If Yes, please provide details: [] 1 Unit, [] 2 Units, [] Completed, [] Infusing. Addition information:     Recommendation    Incomplete orders   Additional Comments: Patient needs assist x1 to ambulate to the bathroom. Will not use a cane.    If any further questions, please call Sending RN at ext: [phone]    Electronically signed by: Electronically signed by Nikki Engle RN on 4/7/2025 at 11:47 AM

## 2025-04-07 NOTE — PROGRESS NOTES
4 Eyes Skin Assessment     NAME:  Juwan Hooks  YOB: 1940  MEDICAL RECORD NUMBER:  [id number]    The patient is being assessed for  Admission    I agree that at least one RN has performed a thorough Head to Toe Skin Assessment on the patient. ALL assessment sites listed below have been assessed.      Areas assessed by both nurses:    Head, Face, Ears, Shoulders, Back, Chest, Arms, Elbows, Hands, Sacrum. Buttock, Coccyx, Ischium, and Legs. Feet and Heels        Does the Patient have a Wound? No noted wound(s)       Dank Prevention initiated by RN: No  Wound Care Orders initiated by RN: No    Pressure Injury (Stage 3,4, Unstageable, DTI, NWPT, and Complex wounds) if present, place Wound referral order by RN under : No    New Ostomies, if present place, Ostomy referral order under : No     Nurse 1 eSignature: Electronically signed by Niru Harris RN on 4/7/25 at 2:00 PM EDT    **SHARE this note so that the co-signing nurse can place an eSignature**    Nurse 2 eSignature: Electronically signed by Fawn Fernandez RN on 4/7/25 at 2:04 PM EDT

## 2025-04-07 NOTE — H&P
V2.0  History and Physical      Name:  Juwan Hooks /Age/Sex: 1940  (84 y.o. male)   MRN & CSN:  [id number] & [id number] Encounter Date/Time: 2025 11:29 AM EDT   Location:   PCP: Carlos De Los Santos MD       Hospital Day: 1    Assessment and Plan:   Juwan Hooks is a 84 y.o. male with a past medical history of atrial fibrillation on Coumadin, hypertension, recent admission secondary to sepsis, acute cystitis (discharged 3/12/25)  who presents with Chest pain    Chest pain, atypical  Non-MI troponin elevation  - presented with dull, aching left-sided pain above area of rib fractures. Nonexertional. Nonradiating. Resolved on admit.   - EKG with atrial fibrillation, no acute ST changes, RBBB (seen on prior)  - HS trop 29, 28  - BNP 1.6K. Appears euvolemic. No recent echo on file.   - CT chest w/o contrast showed previous rib fractures,  no further acute process  - no recent ischemic eval  - start ASA 81mg.  Continue home Coumadin, statin, beta-blocker  - check Hgb A1c, lipid panel, echo  - trend trop, monitor on tele, repeat EKG in AM  - cardiology consulted, appreciate recs (follows with Dr. Youssef)    Persistent atrial fibrillation  - rate-controlled  - Continue home metoprolol  - Pharmacy to dose Coumadin    Rib fractures  - known to patient  - in setting of recent mechanical fall  - continue PRN pain control, incentive spirometer    Rash  - maculopapular, pruritic rash of upper chest wall. Crosses midline, nonvesicular, not painful - not shingles. Likely irritation from shaving chest and possibly from voltaren gel.     Hypertension, uncontrolled  -/93 on admit  - Reports compliance with home medication  - Resume home ARB, Lopressor  - Appreciate cardiology assistance with med titration if needed    Thyroid nodule  - noted on admit CT  - recommend outpatient thyroid US    Hyperlipidemia  -Continue home statin     Recent hospital admission for sepsis secondary to bacteremia and UTI  -Finished

## 2025-04-11 ENCOUNTER — HOSPITAL ENCOUNTER (OUTPATIENT)
Age: 85
Discharge: HOME | End: 2025-04-11
Payer: MEDICARE

## 2025-04-11 ENCOUNTER — PARAMEDICINE (OUTPATIENT)
Dept: OTHER | Age: 85
End: 2025-04-11

## 2025-04-11 LAB
BACTERIA URNS QL MICRO: ABNORMAL
BILIRUB UR QL STRIP: NEGATIVE
CLARITY UR: CLEAR
COLOR UR: YELLOW
EPI CELLS #/AREA URNS HPF: ABNORMAL /HPF
GLUCOSE UR STRIP-MCNC: NEGATIVE MG/DL
HGB UR QL STRIP.AUTO: NEGATIVE
KETONES UR STRIP-MCNC: NEGATIVE MG/DL
LEUKOCYTE ESTERASE UR QL STRIP: NEGATIVE
NITRITE UR QL STRIP: NEGATIVE
PH UR STRIP: 7 [PH] (ref 5–8)
PROT UR STRIP-MCNC: ABNORMAL MG/DL
RBC #/AREA URNS HPF: ABNORMAL /HPF
SP GR UR STRIP: 1.01 (ref 1–1.03)
UROBILINOGEN UR STRIP-ACNC: 0.2 EU/DL (ref 0–1)
WBC #/AREA URNS HPF: ABNORMAL /HPF

## 2025-04-11 PROCEDURE — 87040 BLOOD CULTURE FOR BACTERIA: CPT

## 2025-04-11 PROCEDURE — 81001 URINALYSIS AUTO W/SCOPE: CPT

## 2025-04-11 PROCEDURE — 36415 COLL VENOUS BLD VENIPUNCTURE: CPT

## 2025-04-11 PROCEDURE — 87086 URINE CULTURE/COLONY COUNT: CPT

## 2025-04-11 NOTE — PROGRESS NOTES
Patient was referred to the CP program by his daughter Sarbjit. Met with Sarbjit this morning. Answered questions regarding resources for the patient. Patient needs a new PCP that is local so that he doesn't have to drive to [place]. Advised Sarbjit who was taking new patients. Talked about HH coming for nurse check ins  and possibly PT.     Sarbjit would like to talk with patient before a home visit is set up to see if she needs to be there. Sarbjit will get back to me. Will continue to follow.

## 2025-04-12 LAB
MICROORGANISM SPEC CULT: NORMAL
SPECIMEN DESCRIPTION: NORMAL

## 2025-04-13 LAB
MICROORGANISM SPEC CULT: NORMAL
SERVICE CMNT-IMP: NORMAL
SPECIMEN DESCRIPTION: NORMAL

## 2025-04-16 LAB
MICROORGANISM SPEC CULT: NORMAL
SERVICE CMNT-IMP: NORMAL
SPECIMEN DESCRIPTION: NORMAL

## 2025-04-21 ENCOUNTER — OFFICE VISIT (OUTPATIENT)
Dept: CARDIOLOGY CLINIC | Age: 85
End: 2025-04-21
Payer: MEDICARE

## 2025-04-21 VITALS
HEIGHT: 71 IN | DIASTOLIC BLOOD PRESSURE: 72 MMHG | HEART RATE: 72 BPM | BODY MASS INDEX: 28.17 KG/M2 | WEIGHT: 201.2 LBS | SYSTOLIC BLOOD PRESSURE: 124 MMHG

## 2025-04-21 DIAGNOSIS — I48.21 PERMANENT ATRIAL FIBRILLATION (HCC): ICD-10-CM

## 2025-04-21 DIAGNOSIS — I10 PRIMARY HYPERTENSION: ICD-10-CM

## 2025-04-21 DIAGNOSIS — R07.9 CHEST PAIN, UNSPECIFIED TYPE: Primary | ICD-10-CM

## 2025-04-21 PROCEDURE — G8427 DOCREV CUR MEDS BY ELIG CLIN: HCPCS | Performed by: INTERNAL MEDICINE

## 2025-04-21 PROCEDURE — 1159F MED LIST DOCD IN RCRD: CPT | Performed by: INTERNAL MEDICINE

## 2025-04-21 PROCEDURE — G8417 CALC BMI ABV UP PARAM F/U: HCPCS | Performed by: INTERNAL MEDICINE

## 2025-04-21 PROCEDURE — 99214 OFFICE O/P EST MOD 30 MIN: CPT | Performed by: INTERNAL MEDICINE

## 2025-04-21 PROCEDURE — 3074F SYST BP LT 130 MM HG: CPT | Performed by: INTERNAL MEDICINE

## 2025-04-21 PROCEDURE — 3078F DIAST BP <80 MM HG: CPT | Performed by: INTERNAL MEDICINE

## 2025-04-21 PROCEDURE — 1036F TOBACCO NON-USER: CPT | Performed by: INTERNAL MEDICINE

## 2025-04-21 PROCEDURE — 1124F ACP DISCUSS-NO DSCNMKR DOCD: CPT | Performed by: INTERNAL MEDICINE

## 2025-04-21 NOTE — ASSESSMENT & PLAN NOTE
Rate is well-controlled on metoprolol 50 twice daily and is anticoagulated with warfarin with a therapeutic INR of 2.0.  Has declined Watchman device at least for now.

## 2025-04-21 NOTE — PROGRESS NOTES
Juwan Hooks  1940  Carlos De Los Santos MD      Chief Complaint   Patient presents with    Hypertension    Follow-up    Atrial Fibrillation     Hospital follow up  No chest pains, SOB dizziness, swelling is about the same, and no palpitations     Chief complaint and HPI:  Juwan Hooks  is a 84 y.o. male following up from recent hospitalization for atypical left shoulder pain which has not recurred since then.  He denies any chest pains or any increasing shortness of breath.  He is compliant to his medications.  Had not had any falls walking with a cane.    Rest of the Cardiovascular system review is otherwise unchanged from prior encounter.  Past medical history:  has a past medical history of At high risk for injury related to fall, Atrial flutter (HCC), H/O Doppler ultrasound, H/O echocardiogram, H/O myocardial perfusion scan, Hypertension, Varicose veins, and Venous insufficiency.  Past surgical history:  has a past surgical history that includes Cholecystectomy; hernia repair; Tooth Extraction; Cataract removal; Hemorrhoid surgery; Prostate Cryoablation (2016); cyst removal; Cardioversion (3/6/2012); and Colonoscopy (N/A, 2020).  Social History:   Social History     Tobacco Use    Smoking status: Former     Current packs/day: 0.00     Types: Cigarettes     Start date: 1960     Quit date: 2000     Years since quittin.3    Smokeless tobacco: Never    Tobacco comments:     smoked pipe   Substance Use Topics    Alcohol use: No     Family history: family history is not on file.  ALLERGIES:  Latex, Dicyclomine, and Neosporin [bacitracin-neomycin-polymyxin]  Current Outpatient Medications   Medication Sig Dispense Refill    Pseudoephedrine-DM-GG (CAPMIST DM PO) Take 1 tablet by mouth in the morning, at noon, in the evening, and at bedtime      sodium chloride (OCEAN, BABY AYR) 0.65 % nasal spray 1 spray by Nasal route as needed for Congestion 30 mL 0    magnesium oxide (MAG-OX) 400 (240 Mg)

## 2025-04-21 NOTE — ASSESSMENT & PLAN NOTE
Had isolated episode of left shoulder pain on 4/7/2025 had not had much recurrence.  We will consider further noninvasive valuation if he has recurrent symptoms and he will notify us.

## 2025-05-01 ENCOUNTER — TELEPHONE (OUTPATIENT)
Dept: OTHER | Age: 85
End: 2025-05-01

## 2025-05-01 NOTE — TELEPHONE ENCOUNTER
Reached out to patients daughter Sarbjit. States her dad is doing fine. States she will call me when they are ready for any services. Will leave the file open for a while.

## 2025-05-13 ENCOUNTER — APPOINTMENT (OUTPATIENT)
Dept: PHARMACY | Age: 85
End: 2025-05-13
Payer: MEDICARE

## 2025-05-13 DIAGNOSIS — I48.21 PERMANENT ATRIAL FIBRILLATION (HCC): Primary | ICD-10-CM

## 2025-05-13 NOTE — PROGRESS NOTES
Medication Management Service  [facility]  [phone]    Visit Date: 5/13/2025   Subjective:       Juwan Hooks is a 84 y.o. male who presents to clinic today for anticoagulation monitoring and adjustment.    Patient seen in clinic for warfarin management due to  Indication:   atrial fibrillation.   INR goal: of 2.0-3.0.  Duration of therapy: indefinite.    Patient reports the following:   Adherent with regimen  Missed or extra doses:  None   Bleeding or thromboembolic side effects:  None  Significant medication changes:  None  Significant dietary changes: None  Significant alcohol or tobacco changes: None  Significant recent illness, disease state changes, or hospitalization: ER 3/26 for fall, Hospitalized at [facility] 4/7 for chest pain  Upcoming surgeries or procedures:  None  Falls: None           Assessment and Plan     PT/INR done in office per protocol.   INR today is ***, {INR TYPE:[id number]}.      Plan:  Will continue current regimen of warfarin ***.     Recheck INR in 8 week(s).     Patient verbalized understanding of dosing directions and information discussed. Dosing schedule given to patient including phone number, appointment date, and time. Progress note sent to referring office.    Electronically signed by Britt Tobar RPH on 5/13/2025    For Pharmacy Admin Tracking Only    Intervention Detail: {Anticoag Intervention Detail:44297}  Total # of Interventions Recommended: {Count:[id number]}  Total # of Interventions Accepted: {Count:[id number]}  Time Spent (min): {Time Spent:53394}

## 2025-05-20 ENCOUNTER — ANTI-COAG VISIT (OUTPATIENT)
Dept: PHARMACY | Age: 85
End: 2025-05-20
Payer: MEDICARE

## 2025-05-20 LAB
INTERNATIONAL NORMALIZATION RATIO, POC: 1.9
POC INR: 1.9 (ref 0.9–1.2)
PROTHROMBIN TIME, POC: 0
PROTHROMBIN TIME, POC: 22.6 SEC (ref 10–14.3)

## 2025-05-20 PROCEDURE — 85610 PROTHROMBIN TIME: CPT

## 2025-05-20 PROCEDURE — 99213 OFFICE O/P EST LOW 20 MIN: CPT

## 2025-05-20 RX ORDER — WARFARIN SODIUM 1 MG/1
TABLET ORAL
Qty: 40 TABLET | Refills: 1 | Status: SHIPPED | OUTPATIENT
Start: 2025-05-20

## 2025-05-20 RX ORDER — WARFARIN SODIUM 2.5 MG/1
TABLET ORAL
Qty: 90 TABLET | Refills: 1 | Status: SHIPPED | OUTPATIENT
Start: 2025-05-20

## 2025-05-20 NOTE — PROGRESS NOTES
Medication Management Service  [facility]  [phone]    Visit Date: 5/20/2025   Subjective:       Juwan Hooks is a 84 y.o. male who presents to clinic today for anticoagulation monitoring and adjustment.    Patient seen in clinic for warfarin management due to  Indication:   atrial fibrillation.   INR goal: of 2.0-3.0.  Duration of therapy: indefinite.    Patient reports the following:   Adherent with regimen  Missed or extra doses:  None   Bleeding or thromboembolic side effects:  None  Significant medication changes:  None  Significant dietary changes: None  Significant alcohol or tobacco changes: None  Significant recent illness, disease state changes, or hospitalization: ER 3/26 for fall, Hospitalized at [facility] 4/7 for chest pain  Upcoming surgeries or procedures:  None  Falls: None           Assessment and Plan     PT/INR done in office per protocol.   INR today is 1.9, slightly below goal range.      Plan: Take warfarin 3.5mg x 1 then will continue current regimen of warfarin 2.5mg daily except 3.5mg Mondays and Fridays.     Recheck INR in 8 week(s).     E-Rx sent to [facility] for warfarin 2.5mg tablets #90 with 1 refill and warfarin 1mg tablets #40 with 1 refill    Patient verbalized understanding of dosing directions and information discussed. Dosing schedule given to patient including phone number, appointment date, and time. Progress note sent to referring office.    Electronically signed by Britt Tobar RPH on 5/20/2025    For Pharmacy Admin Tracking Only    Intervention Detail: Dose Adjustment: 1, reason: Therapy Optimization and Refill(s) Provided  Total # of Interventions Recommended: 2  Total # of Interventions Accepted: 2  Time Spent (min): 20

## 2025-05-23 ENCOUNTER — TELEPHONE (OUTPATIENT)
Dept: PHARMACY | Age: 85
End: 2025-05-23

## 2025-05-23 NOTE — TELEPHONE ENCOUNTER
Patient left  stating he is taking KCl. Does not interact with warfarin.    Returned call. Left .    For Pharmacy Admin Tracking Only    Time Spent (min): 5    
[de-identified] : Presents for BP check, labs, and general follow-up.  Does acknowledge a weight gain.  Following with Vascular and Urology.

## 2025-06-16 RX ORDER — WARFARIN SODIUM 1 MG/1
TABLET ORAL
Qty: 40 TABLET | Refills: 1 | Status: SHIPPED | OUTPATIENT
Start: 2025-06-16

## 2025-06-18 ENCOUNTER — PARAMEDICINE (OUTPATIENT)
Dept: OTHER | Age: 85
End: 2025-06-18

## 2025-06-18 NOTE — PROGRESS NOTES
No contact with the family for a while.  At last check in May no needs were identified. Will remove from the program. Should another referral be made another file will be opened.

## 2025-07-15 ENCOUNTER — APPOINTMENT (OUTPATIENT)
Dept: PHARMACY | Age: 85
End: 2025-07-15
Payer: MEDICARE

## 2025-07-15 NOTE — PROGRESS NOTES
Medication Management Service  [facility]  [phone]    Visit Date: 7/15/2025   Subjective:       Juwan Hooks is a 84 y.o. male who presents to clinic today for anticoagulation monitoring and adjustment.    Patient seen in clinic for warfarin management due to  Indication:   atrial fibrillation.   INR goal: of 2.0-3.0.  Duration of therapy: indefinite.    Patient reports the following:   Adherent with regimen  Missed or extra doses:  None   Bleeding or thromboembolic side effects:  None  Significant medication changes:  None  Significant dietary changes: None  Significant alcohol or tobacco changes: None  Significant recent illness, disease state changes, or hospitalization:  None  Upcoming surgeries or procedures:  None  Falls: None           Assessment and Plan     PT/INR done in office per protocol.   INR today is ***, {INR TYPE:[id number]}.      Plan:  Will continue current regimen of warfarin ***.     Recheck INR in 8 week(s).     Patient verbalized understanding of dosing directions and information discussed. Dosing schedule given to patient including phone number, appointment date, and time. Progress note sent to referring office.    Electronically signed by Britt Tobar RPH on 7/15/2025    For Pharmacy Admin Tracking Only    Intervention Detail: {Anticoag Intervention Detail:04313}  Total # of Interventions Recommended: {Count:[id number]}  Total # of Interventions Accepted: {Count:[id number]}  Time Spent (min): {Time Spent:52477}

## 2025-07-22 ENCOUNTER — ANTI-COAG VISIT (OUTPATIENT)
Dept: PHARMACY | Age: 85
End: 2025-07-22
Payer: MEDICARE

## 2025-07-22 LAB
INTERNATIONAL NORMALIZATION RATIO, POC: 1.9
POC INR: 1.9 (ref 0.9–1.2)
PROTHROMBIN TIME, POC: 0

## 2025-07-22 PROCEDURE — 99212 OFFICE O/P EST SF 10 MIN: CPT

## 2025-07-22 PROCEDURE — 85610 PROTHROMBIN TIME: CPT

## 2025-07-22 NOTE — PROGRESS NOTES
Medication Management Service  [facility]  [phone]    Visit Date: 7/22/2025   Subjective:       Juwan Hooks is a 84 y.o. male who presents to clinic today for anticoagulation monitoring and adjustment.    Patient seen in clinic for warfarin management due to  Indication:   atrial fibrillation.   INR goal: of 2.0-3.0.  Duration of therapy: indefinite.    Patient reports the following:   Adherent with regimen  Missed or extra doses:  None   Bleeding or thromboembolic side effects:  None  Significant medication changes:  None  Significant dietary changes: None  Significant alcohol or tobacco changes: None  Significant recent illness, disease state changes, or hospitalization:  None  Upcoming surgeries or procedures:  None  Falls: None           Assessment and Plan     PT/INR done in office per protocol.   INR today is 1.9, slightly below goal range despite previous booster dose.      Plan: Increase dose by ~3% to warfarin 3mg daily except 2.5mg Mondays and Fridays.     Recheck INR in 8 week(s).     Patient verbalized understanding of dosing directions and information discussed. Dosing schedule given to patient including phone number, appointment date, and time. Progress note sent to referring office.    Electronically signed by Britt Tobar RPH on 7/22/2025    For Pharmacy Admin Tracking Only    Intervention Detail: Dose Adjustment: 1, reason: Therapy Optimization  Total # of Interventions Recommended: 1  Total # of Interventions Accepted: 1  Time Spent (min): 15

## 2025-08-01 ENCOUNTER — TELEPHONE (OUTPATIENT)
Dept: PHARMACY | Age: 85
End: 2025-08-01

## 2025-08-01 ENCOUNTER — ANTI-COAG VISIT (OUTPATIENT)
Dept: PHARMACY | Age: 85
End: 2025-08-01
Payer: MEDICARE

## 2025-08-01 LAB
INTERNATIONAL NORMALIZATION RATIO, POC: 2
POC INR: 2 (ref 0.9–1.2)
PROTHROMBIN TIME, POC: 0

## 2025-08-01 PROCEDURE — 85610 PROTHROMBIN TIME: CPT

## 2025-08-01 PROCEDURE — 99211 OFF/OP EST MAY X REQ PHY/QHP: CPT

## 2025-08-01 NOTE — TELEPHONE ENCOUNTER
Patient called stating he just went to the restroom and had blood in his urine.     Will schedule for today.    For Pharmacy Admin Tracking Only    Time Spent (min): 5

## 2025-08-01 NOTE — PROGRESS NOTES
Medication Management Service  [facility]  [phone]    Visit Date: 8/1/2025   Subjective:       Juwan Hooks is a 84 y.o. male who presents to clinic today for anticoagulation monitoring and adjustment.    Patient seen in clinic for warfarin management due to  Indication:   atrial fibrillation.   INR goal: of 2.0-3.0.  Duration of therapy: indefinite.    Patient reports the following:   Adherent with regimen  Missed or extra doses:  None   Bleeding or thromboembolic side effects: Hematuria started yesterday and has continued today. Instructed patient to follow up with urgent care or PCP. Patient voiced understanding.  Significant medication changes:  None  Significant dietary changes: None  Significant alcohol or tobacco changes: None  Significant recent illness, disease state changes, or hospitalization:  None  Upcoming surgeries or procedures:  None  Falls: None           Assessment and Plan     PT/INR done in office per protocol.   INR today is 2, therapeutic.      Plan:  Will continue current regimen of warfarin 3mg daily except 2.5mg Mondays and Fridays.     Recheck INR in 7 week(s).     Patient verbalized understanding of dosing directions and information discussed. Dosing schedule given to patient including phone number, appointment date, and time. Progress note sent to referring office.    Electronically signed by Britt Tobar RPH on 8/1/2025    For Pharmacy Admin Tracking Only    Total # of Interventions Recommended: 0  Total # of Interventions Accepted: 0  Time Spent (min): 15

## (undated) DEVICE — DEFENDO AIR WATER SUCTION AND BIOPSY VALVE KIT FOR  OLYMPUS: Brand: DEFENDO AIR/WATER/SUCTION AND BIOPSY VALVE

## (undated) DEVICE — THE TORRENT IRRIGATION TUBING IS INTENDED TO PROVIDE IRRIGATION VIA IRRIGATION FLUIDS, SUCH AS STERILE WATER, DURING GASTROINTESTINAL ENDOSCOPIC PROCEDURES WHEN USED IN CONJUNCTION WITH AN IRRIGATION PUMP OR ELECTROSURGICAL UNIT.: Brand: TORRENT

## (undated) DEVICE — TRAP POLYP ETRAP

## (undated) DEVICE — THE TORRENT IRRIGATION SCOPE CONNECTOR IS USED WITH THE TORRENT IRRIGATION TUBING TO PROVIDE IRRIGATION FLUIDS SUCH AS STERILE WATER DURING GASTROINTESTINAL ENDOSCOPIC PROCEDURES WHEN USED IN CONJUNCTION WITH AN IRRIGATION PUMP (OR ELECTROSURGICAL UNIT).: Brand: TORRENT

## (undated) DEVICE — LINER SUCT CANSTR 1500CC SEMI RIG W/ POR HYDROPHOBIC SHUT

## (undated) DEVICE — 4-PORT MANIFOLD: Brand: NEPTUNE 2

## (undated) DEVICE — JELLY,LUBE,STERILE,FLIP TOP,TUBE,2-OZ: Brand: MEDLINE

## (undated) DEVICE — LINE SAMP O2 6.5FT W/FEMALE CONN F/ADULT CAPNOLINE PLUS

## (undated) DEVICE — SINGLE-USE POLYPECTOMY SNARE: Brand: CAPTIVATOR

## (undated) DEVICE — TUBING, SUCTION, 1/4" X 10', STRAIGHT: Brand: MEDLINE

## (undated) DEVICE — SOLUTION IV IRRIG WATER 1000ML POUR BRL 2F7114